# Patient Record
Sex: MALE | Race: WHITE | NOT HISPANIC OR LATINO | Employment: OTHER | URBAN - METROPOLITAN AREA
[De-identification: names, ages, dates, MRNs, and addresses within clinical notes are randomized per-mention and may not be internally consistent; named-entity substitution may affect disease eponyms.]

---

## 2018-02-12 RX ORDER — VITAMIN E 268 MG
400 CAPSULE ORAL DAILY
Status: ON HOLD | COMMUNITY
End: 2022-12-26

## 2018-02-12 RX ORDER — CLOPIDOGREL BISULFATE 75 MG/1
75 TABLET ORAL DAILY
COMMUNITY
End: 2022-12-26 | Stop reason: HOSPADM

## 2018-02-12 RX ORDER — TRAMADOL HYDROCHLORIDE 50 MG/1
50 TABLET ORAL EVERY 6 HOURS PRN
COMMUNITY

## 2018-02-12 RX ORDER — PROPRANOLOL HYDROCHLORIDE 80 MG/1
80 CAPSULE, EXTENDED RELEASE ORAL DAILY
COMMUNITY

## 2018-02-12 RX ORDER — MELATONIN
1000 DAILY
Status: ON HOLD | COMMUNITY
End: 2022-12-26

## 2018-02-12 RX ORDER — LANOLIN ALCOHOL/MO/W.PET/CERES
1000 CREAM (GRAM) TOPICAL DAILY
Status: ON HOLD | COMMUNITY
End: 2022-12-26

## 2018-02-12 RX ORDER — TAMSULOSIN HYDROCHLORIDE 0.4 MG/1
1 CAPSULE ORAL NIGHTLY
COMMUNITY

## 2018-02-12 RX ORDER — ASPIRIN 81 MG/1
81 TABLET ORAL DAILY
COMMUNITY
End: 2022-12-26 | Stop reason: HOSPADM

## 2018-02-12 RX ORDER — CHLORAL HYDRATE 500 MG
1000 CAPSULE ORAL
Status: ON HOLD | COMMUNITY
End: 2022-12-26

## 2018-02-12 NOTE — PERIOPERATIVE NURSING NOTE
CALLED AND SPOKE WITH PT CONFIRMING ARRIVAL TIME AND NEED TO BE NPO AFTER MN. MAY TAKE AM MEDS WITH A SIP OF WATER. FRIEND TO ACCOMPANY PT TO HOSPITAL IN THE AM

## 2018-02-13 ENCOUNTER — HOSPITAL ENCOUNTER (OUTPATIENT)
Facility: HOSPITAL | Age: 78
Setting detail: HOSPITAL OUTPATIENT SURGERY
Discharge: HOME | End: 2018-02-13
Attending: INTERNAL MEDICINE | Admitting: INTERNAL MEDICINE

## 2018-02-13 VITALS
WEIGHT: 148 LBS | SYSTOLIC BLOOD PRESSURE: 104 MMHG | OXYGEN SATURATION: 98 % | RESPIRATION RATE: 16 BRPM | BODY MASS INDEX: 21.92 KG/M2 | TEMPERATURE: 98.9 F | DIASTOLIC BLOOD PRESSURE: 57 MMHG | HEIGHT: 69 IN | HEART RATE: 58 BPM

## 2018-02-13 DIAGNOSIS — R07.9 CHEST PAIN, UNSPECIFIED TYPE: ICD-10-CM

## 2018-02-13 PROCEDURE — 99152 MOD SED SAME PHYS/QHP 5/>YRS: CPT | Performed by: INTERNAL MEDICINE

## 2018-02-13 PROCEDURE — 93458 L HRT ARTERY/VENTRICLE ANGIO: CPT | Performed by: INTERNAL MEDICINE

## 2018-02-13 PROCEDURE — 25010000002 FENTANYL CITRATE (PF) 100 MCG/2ML SOLUTION: Performed by: INTERNAL MEDICINE

## 2018-02-13 PROCEDURE — 0 IOPAMIDOL PER 1 ML: Performed by: INTERNAL MEDICINE

## 2018-02-13 PROCEDURE — 25010000002 MIDAZOLAM PER 1 MG: Performed by: INTERNAL MEDICINE

## 2018-02-13 PROCEDURE — 99153 MOD SED SAME PHYS/QHP EA: CPT | Performed by: INTERNAL MEDICINE

## 2018-02-13 PROCEDURE — C1894 INTRO/SHEATH, NON-LASER: HCPCS | Performed by: INTERNAL MEDICINE

## 2018-02-13 PROCEDURE — C1769 GUIDE WIRE: HCPCS | Performed by: INTERNAL MEDICINE

## 2018-02-13 PROCEDURE — 25010000002 HEPARIN (PORCINE) PER 1000 UNITS: Performed by: INTERNAL MEDICINE

## 2018-02-13 RX ORDER — SODIUM CHLORIDE 9 MG/ML
75 INJECTION, SOLUTION INTRAVENOUS CONTINUOUS
Status: DISCONTINUED | OUTPATIENT
Start: 2018-02-13 | End: 2018-02-13 | Stop reason: HOSPADM

## 2018-02-13 RX ORDER — SODIUM CHLORIDE 9 MG/ML
INJECTION, SOLUTION INTRAVENOUS CONTINUOUS PRN
Status: DISCONTINUED | OUTPATIENT
Start: 2018-02-13 | End: 2018-02-13 | Stop reason: HOSPADM

## 2018-02-13 RX ORDER — SODIUM CHLORIDE 0.9 % (FLUSH) 0.9 %
1-10 SYRINGE (ML) INJECTION AS NEEDED
Status: DISCONTINUED | OUTPATIENT
Start: 2018-02-13 | End: 2018-02-13 | Stop reason: HOSPADM

## 2018-02-13 RX ORDER — LIDOCAINE HYDROCHLORIDE 10 MG/ML
0.1 INJECTION, SOLUTION EPIDURAL; INFILTRATION; INTRACAUDAL; PERINEURAL ONCE AS NEEDED
Status: DISCONTINUED | OUTPATIENT
Start: 2018-02-13 | End: 2018-02-13 | Stop reason: HOSPADM

## 2018-02-13 RX ORDER — LIDOCAINE HYDROCHLORIDE 20 MG/ML
INJECTION, SOLUTION INFILTRATION; PERINEURAL AS NEEDED
Status: DISCONTINUED | OUTPATIENT
Start: 2018-02-13 | End: 2018-02-13 | Stop reason: HOSPADM

## 2018-02-13 RX ORDER — ATORVASTATIN CALCIUM 80 MG/1
80 TABLET, FILM COATED ORAL DAILY
COMMUNITY

## 2018-02-13 RX ORDER — SODIUM CHLORIDE 9 MG/ML
100 INJECTION, SOLUTION INTRAVENOUS CONTINUOUS
Status: CANCELLED | OUTPATIENT
Start: 2018-02-13

## 2018-02-13 RX ORDER — FENTANYL CITRATE 50 UG/ML
INJECTION, SOLUTION INTRAMUSCULAR; INTRAVENOUS AS NEEDED
Status: DISCONTINUED | OUTPATIENT
Start: 2018-02-13 | End: 2018-02-13 | Stop reason: HOSPADM

## 2018-02-13 RX ORDER — MIDAZOLAM HYDROCHLORIDE 1 MG/ML
INJECTION INTRAMUSCULAR; INTRAVENOUS AS NEEDED
Status: DISCONTINUED | OUTPATIENT
Start: 2018-02-13 | End: 2018-02-13 | Stop reason: HOSPADM

## 2018-02-13 RX ADMIN — SODIUM CHLORIDE 75 ML/HR: 9 INJECTION, SOLUTION INTRAVENOUS at 07:18

## 2018-02-13 RX ADMIN — SODIUM CHLORIDE 75 ML/HR: 9 INJECTION, SOLUTION INTRAVENOUS at 09:36

## 2018-02-13 NOTE — PLAN OF CARE
Problem: Cardiac Catheterization with/without PCI (Adult)  Goal: Signs and Symptoms of Listed Potential Problems Will be Absent or Manageable (Cardiac Catheterization with/without PCI)  Outcome: Outcome(s) achieved Date Met: 02/13/18 02/13/18 1118   Cardiac Catheterization with/without PCI   Problems Assessed (Cardiac Catheterization) all   Problems Present (Cardiac Catheterization) none

## 2018-02-13 NOTE — PLAN OF CARE
Problem: Patient Care Overview (Adult)  Goal: Plan of Care Review  Outcome: Outcome(s) achieved Date Met: 02/13/18 02/13/18 1119   Coping/Psychosocial Response Interventions   Plan Of Care Reviewed With patient;spouse   Patient Care Overview   Progress improving   Outcome Evaluation   Outcome Summary/Follow up Plan meets desires discharge

## 2018-02-13 NOTE — PLAN OF CARE
Problem: Patient Care Overview (Adult)  Goal: Adult Individualization and Mutuality  Outcome: Outcome(s) achieved Date Met: 02/13/18

## 2018-02-13 NOTE — PROCEDURES
:   Emelia Arellano MD    Procedures performed:  1.  Left heart catheterization.  2.  Left ventriculography    3.  Selective native coronary angiography  4.  Moderate sedation: 30 minutes    Indications: Abnormal stress test       Description of the procedure:  Patient was brought to the cardiac catheter was explained the risk and benefit and alternatives of the procedure. Patient signed informed consent, was prepped and draped in sterile fashion for right radial artery access.  Using micropuncture needle and modified Seldinger technique a 5 [place] sheath was advanced into the artery.  JL3.5 and JR4 catheter was used to engage the left and right coronary artery respectively.  A pigtail catheter was used to cross the aortic valve and perform a left heart catheterization and LV gram.    All exchanges were done over the wire.    Hemostasis was achieved by manual compression / radial band.    No complications noted.    Findings:  1.  Left heart catheterization: LVEDP 7 mmHg      2.  Left ventriculography:  LV function normal.  LVEF 65%.     3.  Selective native coronary angiography:  A. Left main bifurcates into LAD and left circumflex.  No angiographic disease noted.   B. LAD: Large vessel, wraps around the apex.  Gives rise to one prominent diagonal branch.  No angiographic disease noted in the LAD an its branches  C. Left circumflex: Gives rise to small caliber high OM1 followed by large caliber bifurcating OM 2.  No angiographic disease noted in circumflex and its branches  D. Right coronary artery: Dominant vessel.  No angiographic disease noted.      Conclusion:  Normal epicardial coronary blood vessels.  Normal LVEDP.  Preserved LV function.  False-positive stress test      Recommendations:  Aggressive risk factor modification.  Medical management.    Emelia Arellano M.D   CC: Dr Elmo Piña

## 2018-02-13 NOTE — PLAN OF CARE
Problem: Patient Care Overview (Adult)  Goal: Discharge Needs Assessment  Outcome: Outcome(s) achieved Date Met: 02/13/18 02/13/18 1118   Discharge Needs Assessment   Concerns To Be Addressed no discharge needs identified

## 2018-02-13 NOTE — DISCHARGE INSTRUCTIONS
[facility]  [address] [place] [address]    Coronary Angiogram (Radial/Ulnar Approach) After Care    Refer to this sheet in the next few weeks. These instructions provide you with information on caring for yourself after your procedure. Your caregiver may also give you more specific instructions. Your treatment has been planned according to current medical practices, but problems sometimes occur. Call your caregiver if you have any problems or questions after your procedure.    Home Care Instructions:  · You may shower the day after the procedure. Remove the bandage (dressing) and gently wash the site with plain soap and water. Gently pat the site dry. You may apply a band aid daily for 2 days if desired.    · Do not apply powder or lotion to the site.  · Do not submerge the affected site in water for 3 to 5 days or until the site is completely healed.   · Do not lift, push or pull anything over 10 pounds for 2 days after your procedure.  · Inspect the site at least twice daily. You may notice some bruising at the site and it may be tender for 1 to 2 weeks.     · Increase your fluid intake for the next 2 days.    · Keep arm elevated for 24 hours. For the remainder of the day, keep your arm in “Pledge of Allegiance” position when up and about.     · You may drive 24 hours after the procedure unless otherwise instructed by your caregiver.  · Do not operate machinery or power tools for 24 hours.  · A responsible adult should be with you for the first 24 hours after you arrive home. Do not make any important legal decisions or sign legal papers for 24 hours.      Call Your Doctor if:   · You have unusual pain at the radial/ulnar (wrist) site.  · You have redness, warmth, swelling, or pain at the radial/ulnar (wrist) site.  · You have drainage (other than a small amount of blood on the dressing).  · You have chills or a fever > 101.  · Your arm becomes pale or dark, cool, tingly, or numb.  · You  have heavy bleeding from the site, hold pressure on the site for 20 minutes.  If the bleeding stops, apply a fresh bandage and call your cardiologist.  However, if you continue to have bleeding, call 911.

## 2018-02-13 NOTE — H&P
Brief history and physical:    Chief complaint: dyspnea    History of presenting illness: 77-year-old  male who was recently evaluated for dyspnea on exertion by Dr. Piña in outpatient clinic with a stress test.  Patient stresses shows moderate amount of ischemia.  Patient is been brought to cardiac catheter lab as an outpatient to undergo coronary angiography to look for epicardial culprits.  Currently it today he denies any chest pain, and denies dyspnea at rest, orthopnea, PND, pedal edema, palpitations and syncope.  His telemetry monitoring reveals sinus rhythm with PACs/atrial bigeminy.     Past medical history: abnromal stress test    Past surgical history: No cardiac surgeries    Outpatient medications patient on aspirin, Plavix and statin.    Review of systems:  General: No fever no weight loss  Endocrine: No fever, night sweats  Eyes: No blurry vision no diplopia  Head: No headache  Chest: As above  Heart: As above  Abdomen: No abdominal pain, nausea, vomiting, diarrhea  Psych: No hallucinations or delusions  Neurological no motor or sensory deficit  Musculoskeletal: No limb deformities  HEME: No swelling no weight loss  Skin: No ulcers, no rash    On examination: 96/64, 68, 16, afebrile   General: No acute distress  Neck: No thyromegaly  HEENT:PERRLA  Abdomen: Soft, nontender, bowel sounds present  Chest: Normal vesicular breath sounds heard bilaterally  Heart: S1-S2 heard.  Regular, no murmur  Extremity: No edema, distal pulses present.  Musculoskeletal: No obvious scoliosis  Psych: Mood and affect normal  Neuro: Alert and oriented ×3.  Speech and gait normal    Labs: Reviewed    Assessment and plan:  1.  Abnormal stress test: Moderate amount of ischemia in stress test.  Proceed with coronary angiography/ left heart catheterization.  Further plan after heart catheterization done

## 2019-10-02 NOTE — DISCHARGE SUMMARY
Brief Discharge Note:    77 yr old who follows with Dr Humphrey as outpatient comes as outpatient for abnormal stress test.   He is experiencing dyspnea on exertion. Denies titus chest pain, PND, orthopnea, pedal edema, palpitations and syncope.   He under went cardiac cath revealing normal epicardial coronary vessels and preserved LVEF.     Discharge medications:  As in medication reconciliation.    Diet: Cardiac    Allergies: No known drug allergies    Procedures performed: Patient underwent left heart catheterization revealing normal epicardial coronary blood vessels.  Preserved LV function.  Normal LVEDP.    Condition: Stable.    Discharge: Home.  Today after radial care according to postprocedure done.     Outpatient follow-up:  Dr. Piña in 3 weeks    
General

## 2022-12-25 ENCOUNTER — APPOINTMENT (OUTPATIENT)
Dept: CT IMAGING | Facility: HOSPITAL | Age: 82
End: 2022-12-25
Payer: MEDICARE

## 2022-12-25 ENCOUNTER — APPOINTMENT (OUTPATIENT)
Dept: GENERAL RADIOLOGY | Facility: HOSPITAL | Age: 82
End: 2022-12-25
Payer: MEDICARE

## 2022-12-25 ENCOUNTER — HOSPITAL ENCOUNTER (OUTPATIENT)
Facility: HOSPITAL | Age: 82
LOS: 1 days | Discharge: HOME | End: 2022-12-26
Attending: EMERGENCY MEDICINE | Admitting: INTERNAL MEDICINE
Payer: MEDICARE

## 2022-12-25 DIAGNOSIS — R07.81 RIB PAIN ON RIGHT SIDE: ICD-10-CM

## 2022-12-25 DIAGNOSIS — S06.6X0A SUBARACHNOID HEMORRHAGE FOLLOWING INJURY, NO LOSS OF CONSCIOUSNESS, INITIAL ENCOUNTER: Primary | ICD-10-CM

## 2022-12-25 LAB
ALBUMIN SERPL-MCNC: 4.4 G/DL (ref 3.5–5.2)
ALBUMIN/GLOB SERPL: 2.4 G/DL
ALP SERPL-CCNC: 69 U/L (ref 39–117)
ALT SERPL W P-5'-P-CCNC: 38 U/L (ref 1–41)
ANION GAP SERPL CALCULATED.3IONS-SCNC: 8 MMOL/L (ref 5–15)
AST SERPL-CCNC: 34 U/L (ref 1–40)
BASOPHILS # BLD AUTO: 0.06 10*3/MM3 (ref 0–0.2)
BASOPHILS NFR BLD AUTO: 0.6 % (ref 0–1.5)
BILIRUB SERPL-MCNC: 1.5 MG/DL (ref 0–1.2)
BUN SERPL-MCNC: 19 MG/DL (ref 8–23)
BUN/CREAT SERPL: 19.4 (ref 7–25)
CALCIUM SPEC-SCNC: 9.5 MG/DL (ref 8.6–10.5)
CHLORIDE SERPL-SCNC: 108 MMOL/L (ref 98–107)
CO2 SERPL-SCNC: 29 MMOL/L (ref 22–29)
CREAT SERPL-MCNC: 0.98 MG/DL (ref 0.76–1.27)
DEPRECATED RDW RBC AUTO: 49 FL (ref 37–54)
EGFRCR SERPLBLD CKD-EPI 2021: 78 ML/MIN/1.73
EOSINOPHIL # BLD AUTO: 0.1 10*3/MM3 (ref 0–0.4)
EOSINOPHIL NFR BLD AUTO: 1 % (ref 0.3–6.2)
ERYTHROCYTE [DISTWIDTH] IN BLOOD BY AUTOMATED COUNT: 13.2 % (ref 12.3–15.4)
GLOBULIN UR ELPH-MCNC: 1.8 GM/DL
GLUCOSE BLDC GLUCOMTR-MCNC: 70 MG/DL (ref 70–130)
GLUCOSE BLDC GLUCOMTR-MCNC: 85 MG/DL (ref 70–130)
GLUCOSE SERPL-MCNC: 88 MG/DL (ref 65–99)
HCT VFR BLD AUTO: 45.4 % (ref 37.5–51)
HGB BLD-MCNC: 14.8 G/DL (ref 13–17.7)
IMM GRANULOCYTES # BLD AUTO: 0.03 10*3/MM3 (ref 0–0.05)
IMM GRANULOCYTES NFR BLD AUTO: 0.3 % (ref 0–0.5)
INR PPP: 1 (ref 0.9–1.1)
LYMPHOCYTES # BLD AUTO: 1.93 10*3/MM3 (ref 0.7–3.1)
LYMPHOCYTES NFR BLD AUTO: 19.3 % (ref 19.6–45.3)
MAGNESIUM SERPL-MCNC: 1.9 MG/DL (ref 1.6–2.4)
MCH RBC QN AUTO: 32.5 PG (ref 26.6–33)
MCHC RBC AUTO-ENTMCNC: 32.6 G/DL (ref 31.5–35.7)
MCV RBC AUTO: 99.8 FL (ref 79–97)
MONOCYTES # BLD AUTO: 0.83 10*3/MM3 (ref 0.1–0.9)
MONOCYTES NFR BLD AUTO: 8.3 % (ref 5–12)
NEUTROPHILS NFR BLD AUTO: 7.07 10*3/MM3 (ref 1.7–7)
NEUTROPHILS NFR BLD AUTO: 70.5 % (ref 42.7–76)
NRBC BLD AUTO-RTO: 0 /100 WBC (ref 0–0.2)
PHOSPHATE SERPL-MCNC: 3.3 MG/DL (ref 2.5–4.5)
PLATELET # BLD AUTO: 144 10*3/MM3 (ref 140–450)
PMV BLD AUTO: 10.7 FL (ref 6–12)
POTASSIUM SERPL-SCNC: 5.2 MMOL/L (ref 3.5–5.2)
PROT SERPL-MCNC: 6.2 G/DL (ref 6–8.5)
PROTHROMBIN TIME: 13.3 SECONDS (ref 11.7–14.2)
RBC # BLD AUTO: 4.55 10*6/MM3 (ref 4.14–5.8)
SODIUM SERPL-SCNC: 145 MMOL/L (ref 136–145)
WBC NRBC COR # BLD: 10.02 10*3/MM3 (ref 3.4–10.8)

## 2022-12-25 PROCEDURE — 82962 GLUCOSE BLOOD TEST: CPT

## 2022-12-25 PROCEDURE — 36415 COLL VENOUS BLD VENIPUNCTURE: CPT

## 2022-12-25 PROCEDURE — 85025 COMPLETE CBC W/AUTO DIFF WBC: CPT | Performed by: EMERGENCY MEDICINE

## 2022-12-25 PROCEDURE — 84100 ASSAY OF PHOSPHORUS: CPT | Performed by: INTERNAL MEDICINE

## 2022-12-25 PROCEDURE — 71101 X-RAY EXAM UNILAT RIBS/CHEST: CPT

## 2022-12-25 PROCEDURE — 25010000002 HYDRALAZINE PER 20 MG: Performed by: INTERNAL MEDICINE

## 2022-12-25 PROCEDURE — 80053 COMPREHEN METABOLIC PANEL: CPT | Performed by: EMERGENCY MEDICINE

## 2022-12-25 PROCEDURE — 99284 EMERGENCY DEPT VISIT MOD MDM: CPT

## 2022-12-25 PROCEDURE — 99203 OFFICE O/P NEW LOW 30 MIN: CPT | Performed by: NEUROLOGICAL SURGERY

## 2022-12-25 PROCEDURE — 83735 ASSAY OF MAGNESIUM: CPT | Performed by: INTERNAL MEDICINE

## 2022-12-25 PROCEDURE — 70450 CT HEAD/BRAIN W/O DYE: CPT

## 2022-12-25 PROCEDURE — 85610 PROTHROMBIN TIME: CPT | Performed by: EMERGENCY MEDICINE

## 2022-12-25 PROCEDURE — 96374 THER/PROPH/DIAG INJ IV PUSH: CPT

## 2022-12-25 RX ORDER — TAMSULOSIN HYDROCHLORIDE 0.4 MG/1
0.4 CAPSULE ORAL NIGHTLY
Status: DISCONTINUED | OUTPATIENT
Start: 2022-12-25 | End: 2022-12-26 | Stop reason: HOSPADM

## 2022-12-25 RX ORDER — PROPRANOLOL HYDROCHLORIDE 80 MG/1
80 CAPSULE, EXTENDED RELEASE ORAL DAILY
Status: DISCONTINUED | OUTPATIENT
Start: 2022-12-25 | End: 2022-12-26 | Stop reason: HOSPADM

## 2022-12-25 RX ORDER — SODIUM CHLORIDE 0.9 % (FLUSH) 0.9 %
10 SYRINGE (ML) INJECTION AS NEEDED
Status: DISCONTINUED | OUTPATIENT
Start: 2022-12-25 | End: 2022-12-26 | Stop reason: HOSPADM

## 2022-12-25 RX ORDER — LEVETIRACETAM 500 MG/1
500 TABLET ORAL 2 TIMES DAILY
Status: DISCONTINUED | OUTPATIENT
Start: 2022-12-25 | End: 2022-12-26 | Stop reason: HOSPADM

## 2022-12-25 RX ORDER — HYDRALAZINE HYDROCHLORIDE 20 MG/ML
10 INJECTION INTRAMUSCULAR; INTRAVENOUS EVERY 4 HOURS PRN
Status: DISCONTINUED | OUTPATIENT
Start: 2022-12-25 | End: 2022-12-26 | Stop reason: HOSPADM

## 2022-12-25 RX ORDER — HYDROCODONE BITARTRATE AND ACETAMINOPHEN 10; 325 MG/1; MG/1
1 TABLET ORAL EVERY 6 HOURS PRN
Status: DISCONTINUED | OUTPATIENT
Start: 2022-12-25 | End: 2022-12-26 | Stop reason: HOSPADM

## 2022-12-25 RX ORDER — ATORVASTATIN CALCIUM 80 MG/1
80 TABLET, FILM COATED ORAL DAILY
Status: DISCONTINUED | OUTPATIENT
Start: 2022-12-25 | End: 2022-12-26 | Stop reason: HOSPADM

## 2022-12-25 RX ORDER — TRAMADOL HYDROCHLORIDE 50 MG/1
50 TABLET ORAL EVERY 6 HOURS PRN
Status: DISCONTINUED | OUTPATIENT
Start: 2022-12-25 | End: 2022-12-25

## 2022-12-25 RX ADMIN — TRAMADOL HYDROCHLORIDE 50 MG: 50 TABLET, COATED ORAL at 17:08

## 2022-12-25 RX ADMIN — PROPRANOLOL HYDROCHLORIDE 80 MG: 80 CAPSULE, EXTENDED RELEASE ORAL at 18:00

## 2022-12-25 RX ADMIN — HYDRALAZINE HYDROCHLORIDE 10 MG: 20 INJECTION INTRAMUSCULAR; INTRAVENOUS at 16:38

## 2022-12-25 RX ADMIN — HYDROCODONE BITARTRATE AND ACETAMINOPHEN 1 TABLET: 10; 325 TABLET ORAL at 18:17

## 2022-12-25 RX ADMIN — LEVETIRACETAM 500 MG: 500 TABLET, FILM COATED ORAL at 20:24

## 2022-12-25 RX ADMIN — TAMSULOSIN HYDROCHLORIDE 0.4 MG: 0.4 CAPSULE ORAL at 20:24

## 2022-12-25 RX ADMIN — ATORVASTATIN CALCIUM 80 MG: 80 TABLET, FILM COATED ORAL at 16:38

## 2022-12-25 NOTE — H&P
[facility]  [phone]  Dr. Eze Cortes      Subjective   LOS: 0 days     82-year-old male who slipped on the ice yesterday and hit his head on the ground.  This morning he noticed some blood on his head and came into the ED.  CT head shows small traumatic subarachnoid hemorrhage.  Admitted to ICU for monitoring.  His med list shows that patient is on Plavix.  However patient strenuously denies being on this medication.  I do see it listed on multiple prior office notes from outside.  He follows with vascular surgery for peripheral vascular disease.  Vascular notes mention aspirin but his PCP office note does not include a list of his current medications.  We will need to clarify.  In any case he denies coronary artery disease and no prior stents to heart or 2 legs.  He does have peripheral vascular disease.  He is a current cigarette smoker.  He has COPD and uses inhalers at home but not oxygen.  He denies any history of cancer.  He drinks variably 3-5 drinks a day and had 4-5 alcoholic beverages last night.  He does not report any history of alcohol withdrawal.  No prior history of stroke    Devin Scales  reports current alcohol use.,  reports that he has been smoking. He has never used smokeless tobacco.     Past Hx:  has a past medical history of Benign essential tremor, BPH (benign prostatic hyperplasia), Chest pain, Dyspnea, Fatigue, and Hyperlipidemia.  Surg Hx:  has a past surgical history that includes Cardiac catheterization (N/A, 2/13/2018); Cardiac catheterization (N/A, 2/13/2018); and Cardiac catheterization (N/A, 2/13/2018).  FH: family history is not on file.  SH:  reports that he has been smoking. He has never used smokeless tobacco. He reports current alcohol use. He reports that he does not use drugs.    Medications Prior to Admission   Medication Sig Dispense Refill Last Dose   • aspirin 81 MG EC tablet Take 81 mg by mouth Daily.      • atorvastatin (LIPITOR) 80 MG tablet  Take 80 mg by mouth Daily.      • cholecalciferol (VITAMIN D3) 1000 units tablet Take 1,000 Units by mouth Daily.      • clopidogrel (PLAVIX) 75 MG tablet Take 75 mg by mouth Daily.      • Omega-3 Fatty Acids (FISH OIL) 1000 MG capsule capsule Take 1,000 mg by mouth Daily With Breakfast.      • propranolol LA (INDERAL LA) 80 MG 24 hr capsule Take 80 mg by mouth Daily.      • tamsulosin (FLOMAX) 0.4 MG capsule 24 hr capsule Take 1 capsule by mouth Every Night.      • traMADol (ULTRAM) 50 MG tablet Take 50 mg by mouth Every 6 (Six) Hours As Needed for Moderate Pain .      • vitamin B-12 (CYANOCOBALAMIN) 1000 MCG tablet Take 1,000 mcg by mouth Daily.      • vitamin E 400 UNIT capsule Take 400 Units by mouth Daily.        No Known Allergies    Review of Systems   Constitutional: Negative for chills and fever.   HENT: Negative for congestion and sore throat.    Respiratory: Negative for cough and shortness of breath.    Cardiovascular: Negative for chest pain and leg swelling.   Gastrointestinal: Negative for abdominal pain, nausea and vomiting.   Genitourinary: Negative for dysuria and hematuria.   Musculoskeletal: Negative for arthralgias and back pain.   Skin: Negative for pallor and rash.   Neurological: Positive for headaches. Negative for seizures.   Psychiatric/Behavioral: Negative for agitation and confusion.     Vital Signs past 24hrs  BP range: BP: (153-177)/(76-96) 164/76  Pulse range: Heart Rate:  [71-88] 84  Resp rate range: Resp:  [16] 16  Temp range: Temp (24hrs), Av.6 °F (35.9 °C), Min:96.6 °F (35.9 °C), Max:96.6 °F (35.9 °C)    Oxygen range: SpO2:  [94 %-97 %] 96 %;  ;   Device (Oxygen Therapy): room air   ; There is no height or weight on file to calculate BMI.  No intake/output data recorded.    Adult male who looks older than stated age.  Pupils equal react light.  Oropharynx somewhat dry.  Class II Mallampati airway large tongue.  JVP not elevated trachea midline thyroid not enlarged.   Nasopharynx without discharge.  Lungs reveal bilateral air entry.  Diminished breath sounds but equal with no wheezing or rales or rhonchi.  Percussion note resonant chest expansion equal no chest wall deformity or tenderness.  Heart examination S1-S2 present with regular rhythm no murmurs.  No edema lower extremities.  Abdomen is soft nontender bowel sounds present no liver spleen enlargement.  No peripheral cyanosis clubbing.  Moves all 4 extremities sensorimotor intact.  No cervical, axillary, inguinal adenopathy.    Results Review:    I have reviewed the laboratory and imaging data from current admission. My annotations are as noted in assessment and plan.    Medication Review:  I have reviewed the current MAR. My annotations are as noted in assessment and plan.    Plan   PCCM Problems  Traumatic subarachnoid hemorrhage  COPD without exacerbation  Current cigarette smoker  Current regular use of alcohol  Peripheral vascular disease  Unclear if patient currently on clopidogrel at home      Plan of Treatment    Appreciate neurosurgery input.  Monitor for now.  Keppra for 5 days as per outlined by neurosurgery.  Head CT tomorrow and if stable possibly discharge home.    COPD without exacerbation.  We will give Brovana twice daily.    Patient counseled to quit smoking.    Patient counseled to limit or completely abstain from alcohol.    Peripheral vascular disease on aspirin but will hold medication.  Unclear if patient is on clopidogrel and this will need to be clarified tomorrow morning.  He should also probably continue to hold his vitamin D.    Currently hypertension.  Is on propranolol at home which I will continue.  Will give as needed hydralazine to keep systolic less than 150.    Electronically signed by Eze Cortes MD, 12/25/22, 4:06 PM EST.      Part of this note may be an electronic transcription/translation of spoken language to printed text using the Dragon Dictation System.

## 2022-12-25 NOTE — CONSULTS
NEUROSURGERY CONSULT      Devin Scales  1940  [id number]    Referring Provider: No referring provider defined for this encounter.  Reason for Consultation/Chief Complaint: Head trauma, GLF    Patient Care Team:  Baldev Kothari MD as PCP - General (Family Medicine)  Herminio Humphrey MD as Consulting Physician (Cardiology)    Subjective .     History of Present Illness:    Duration: 1 day ago  Severity: Severe  Timing: Acute  Associated Symptoms: Headache, no other focal neurological deficits. Some anterograde memory loss.  Narrative: Fell after slipping on ice last night. Does not remember hitting his head. Complains of current mild headache. Takes Plavix.    While in the room and during my examination of the patient I wore a mask and eye protection.  I washed my hands before and after this patient encounter.  The patient was also wearing a mask.    Review of Systems: All 14 systems are reviewed and are negative except for what is documented above in the HPI  Review of Systems    History: I have reviewed and agree with the following documented PMH, PSH, FH and there no additions or changes.  Past Medical History:   Diagnosis Date   • Benign essential tremor    • BPH (benign prostatic hyperplasia)    • Chest pain    • Dyspnea    • Fatigue    • Hyperlipidemia     and   Past Surgical History:   Procedure Laterality Date   • CARDIAC CATHETERIZATION N/A 2/13/2018    Procedure: Left Heart Cath;  Surgeon: Emelia Arellano MD;  Location: [facility] INVASIVE LOCATION;  Service:    • CARDIAC CATHETERIZATION N/A 2/13/2018    Procedure: Coronary angiography;  Surgeon: Emelia Arellano MD;  Location: [facility] INVASIVE LOCATION;  Service:    • CARDIAC CATHETERIZATION N/A 2/13/2018    Procedure: Left ventriculography;  Surgeon: Emelia Arellano MD;  Location: [facility] INVASIVE LOCATION;  Service:     and The patient has a family history of    Objective     Physical Exam:  CON:  Appears stated age. No acute  distress.  HEENT:  Atraumatic and normocephalic.  PULM:  Breathing nonlabored on room air  CARDIO:  RRR, Pulses 2+  MSK:  Limbs intact. Musculoskeletal pain is absent  PSYCH:  Appears within normal limits.  SKIN:  No noticeable skin lesions or abrasions  VITALS:  Temp:  [96.6 °F (35.9 °C)] 96.6 °F (35.9 °C)  Heart Rate:  [71-75] 74  Resp:  [16] 16  BP: (153-177)/(83-96) 153/83, There is no height or weight on file to calculate BMI.  NEURO: Neurologic Exam     Mental Status   Oriented to person, place, and time.   Attention: normal. Concentration: normal.   Speech: speech is normal   Level of consciousness: alert    Cranial Nerves   Cranial nerves II through XII intact.     Motor Exam   Muscle bulk: normal  Overall muscle tone: normal    Strength   Strength 5/5 except as noted.     Sensory Exam   Light touch normal.     Gait, Coordination, and Reflexes     Coordination   Romberg: negative    Reflexes   Reflexes 2+ except as noted.         Results Review: I reviewed the patient's new clinical results.    CT: CT of the head was reviewed and shows R frontal tSAH without any mass effect or shift    I personally reviewed the images from the following radiographic studies.    Lab Results (last 24 hours)     Procedure Component Value Units Date/Time    CBC & Differential [[id number]]  (Abnormal) Collected: 12/25/22 1433    Specimen: Blood Updated: 12/25/22 1442    Narrative:      The following orders were created for panel order CBC & Differential.  Procedure                               Abnormality         Status                     ---------                               -----------         ------                     CBC Auto Differential[[id number]]        Abnormal            Final result                 Please view results for these tests on the individual orders.    Comprehensive Metabolic Panel [[id number]] Collected: 12/25/22 1433    Specimen: Blood Updated: 12/25/22 1435    Protime-INR [[id number]] Collected: 12/25/22  1433    Specimen: Blood Updated: 12/25/22 1435    CBC Auto Differential [[id number]]  (Abnormal) Collected: 12/25/22 1433    Specimen: Blood Updated: 12/25/22 1442     WBC 10.02 10*3/mm3      RBC 4.55 10*6/mm3      Hemoglobin 14.8 g/dL      Hematocrit 45.4 %      MCV 99.8 fL      MCH 32.5 pg      MCHC 32.6 g/dL      RDW 13.2 %      RDW-SD 49.0 fl      MPV 10.7 fL      Platelets 144 10*3/mm3      Neutrophil % 70.5 %      Lymphocyte % 19.3 %      Monocyte % 8.3 %      Eosinophil % 1.0 %      Basophil % 0.6 %      Immature Grans % 0.3 %      Neutrophils, Absolute 7.07 10*3/mm3      Lymphocytes, Absolute 1.93 10*3/mm3      Monocytes, Absolute 0.83 10*3/mm3      Eosinophils, Absolute 0.10 10*3/mm3      Basophils, Absolute 0.06 10*3/mm3      Immature Grans, Absolute 0.03 10*3/mm3      nRBC 0.0 /100 WBC           Assessment & Plan :     Assessment: Devin Scales is a 82 y.o. male who presents with R frontal tSAH  Additional workup: Repeat HCT tomorrow AM, Keppra 500 BID x5d total  Surgery planned: None  Additional plan: Devin is doing well. I counseled him about a concussion and his head bleed. He is on Plavix and I think we should hold that while he is inpatient and he can restart it a week after he leaves the hospital if his HCT is stable tomorrow. Keppra x5d. Unlikely to be symptomatic.      * No active hospital problems. *      I discussed the patient's findings and my recommendations with patient    Brian Joiner MD  12/25/22  14:50 EST

## 2022-12-25 NOTE — ED TRIAGE NOTES
Slipped on ice last night.  He c/o right side pain.  Denies hitting head.  No blood thinners    Patient was placed in face mask during first look triage.  Patient was wearing a face mask throughout encounter.  I wore personal protective equipment throughout the encounter.  Hand hygiene was performed before and after patient encounter.

## 2022-12-25 NOTE — ED PROVIDER NOTES
EMERGENCY DEPARTMENT ENCOUNTER    Room Number:  35/35  Date of encounter:  12/25/2022  PCP: Baldev Kothari MD  Historian: Patient, friend at bedside    I used full protective equipment while examining this patient.  This includes face mask, gloves and protective eyewear.  I washed my hands before entering the room and immediately upon leaving the room      HPI:  Chief Complaint: Fall  A complete HPI/ROS/PMH/PSH/SH/FH are unobtainable due to: None    Context: Devin Scales is a 82 y.o. male who presents to the ED c/o fall.  Patient tripped and fell about 8:00 last night as he slipped on the ice.  He landed on his right side and complains of pain about the right lateral ribs.  Also hit his head and sustained abrasion to the back of his head.  He did not lose consciousness.  Complains of mild headache.  Most of the pain is at the right lateral ribs and is worsened with movement, deep breath or breathing.  Denies abdominal pain.  Denies injury to the arms or legs.      MEDICAL RECORD REVIEW  I reviewed prior medical records including recent note with vascular surgery.  Patient has a history of hyperlipidemia, BPH and essential tremor.  Patient is anticoagulated on Plavix    PAST MEDICAL HISTORY  Active Ambulatory Problems     Diagnosis Date Noted   • No Active Ambulatory Problems     Resolved Ambulatory Problems     Diagnosis Date Noted   • No Resolved Ambulatory Problems     Past Medical History:   Diagnosis Date   • Benign essential tremor    • BPH (benign prostatic hyperplasia)    • Chest pain    • Dyspnea    • Fatigue    • Hyperlipidemia          PAST SURGICAL HISTORY  Past Surgical History:   Procedure Laterality Date   • CARDIAC CATHETERIZATION N/A 2/13/2018    Procedure: Left Heart Cath;  Surgeon: Emelia Arellano MD;  Location: [facility] INVASIVE LOCATION;  Service:    • CARDIAC CATHETERIZATION N/A 2/13/2018    Procedure: Coronary angiography;  Surgeon: Emelia Arellano MD;  Location: [facility] INVASIVE  LOCATION;  Service:    • CARDIAC CATHETERIZATION N/A 2/13/2018    Procedure: Left ventriculography;  Surgeon: Emelia Arellano MD;  Location: [facility] INVASIVE LOCATION;  Service:          FAMILY HISTORY  History reviewed. No pertinent family history.      SOCIAL HISTORY  Social History     Socioeconomic History   • Marital status: Single   Tobacco Use   • Smoking status: Every Day   • Smokeless tobacco: Never   Substance and Sexual Activity   • Alcohol use: Yes     Comment: SOCIALLY   • Drug use: No   • Sexual activity: Defer         ALLERGIES  Patient has no known allergies.       REVIEW OF SYSTEMS  Review of Systems   Constitutional: Negative for fever.   Respiratory: Negative for shortness of breath.    Cardiovascular: Negative for chest pain.   Musculoskeletal:        Right lateral rib pain   Neurological: Positive for headaches.   All other systems reviewed and are negative.          PHYSICAL EXAM    I have reviewed the triage vital signs and nursing notes.    ED Triage Vitals   Temp Pulse Resp BP SpO2   12/25/22 0951 -- 12/25/22 0951 12/25/22 1003 --   96.6 °F (35.9 °C)  16 177/86       Temp src Heart Rate Source Patient Position BP Location FiO2 (%)   12/25/22 0951 -- -- -- --   Tympanic           Physical Exam  GENERAL: Alert and pleasant male in no obvious distress.  Triage vitals reviewed and are benign  HENT: nares patent, roughly 3 x 3 cm abrasion to the occiput without active bleeding  EYES: no scleral icterus  CV: regular rhythm, regular rate-no murmur  RESPIRATORY: normal effort, clear to auscultation bilaterally-O2 sats upper 90s on room air  ABDOMEN: soft, nontender to palp  MUSCULOSKELETAL: Spine-no segment tenderness to palpation about the cervical, thoracic or lumbar spine  Upper extremities-atraumatic  Lower extremities-atraumatic  Chest- moderate tenderness to palpation at the right lower lateral ribs.  No obvious bony deformity.  NEURO: Strength sensation and coordination are grossly  intact.  Speech and mentation are unremarkable  SKIN: warm, dry      LAB RESULTS  Recent Results (from the past 24 hour(s))   Comprehensive Metabolic Panel    Collection Time: 12/25/22  2:33 PM    Specimen: Blood   Result Value Ref Range    Glucose 88 65 - 99 mg/dL    BUN 19 8 - 23 mg/dL    Creatinine 0.98 0.76 - 1.27 mg/dL    Sodium 145 136 - 145 mmol/L    Potassium 5.2 3.5 - 5.2 mmol/L    Chloride 108 (H) 98 - 107 mmol/L    CO2 29.0 22.0 - 29.0 mmol/L    Calcium 9.5 8.6 - 10.5 mg/dL    Total Protein 6.2 6.0 - 8.5 g/dL    Albumin 4.40 3.50 - 5.20 g/dL    ALT (SGPT) 38 1 - 41 U/L    AST (SGOT) 34 1 - 40 U/L    Alkaline Phosphatase 69 39 - 117 U/L    Total Bilirubin 1.5 (H) 0.0 - 1.2 mg/dL    Globulin 1.8 gm/dL    A/G Ratio 2.4 g/dL    BUN/Creatinine Ratio 19.4 7.0 - 25.0    Anion Gap 8.0 5.0 - 15.0 mmol/L    eGFR 78.0 >60.0 mL/min/1.73   Protime-INR    Collection Time: 12/25/22  2:33 PM    Specimen: Blood   Result Value Ref Range    Protime 13.3 11.7 - 14.2 Seconds    INR 1.00 0.90 - 1.10   CBC Auto Differential    Collection Time: 12/25/22  2:33 PM    Specimen: Blood   Result Value Ref Range    WBC 10.02 3.40 - 10.80 10*3/mm3    RBC 4.55 4.14 - 5.80 10*6/mm3    Hemoglobin 14.8 13.0 - 17.7 g/dL    Hematocrit 45.4 37.5 - 51.0 %    MCV 99.8 (H) 79.0 - 97.0 fL    MCH 32.5 26.6 - 33.0 pg    MCHC 32.6 31.5 - 35.7 g/dL    RDW 13.2 12.3 - 15.4 %    RDW-SD 49.0 37.0 - 54.0 fl    MPV 10.7 6.0 - 12.0 fL    Platelets 144 140 - 450 10*3/mm3    Neutrophil % 70.5 42.7 - 76.0 %    Lymphocyte % 19.3 (L) 19.6 - 45.3 %    Monocyte % 8.3 5.0 - 12.0 %    Eosinophil % 1.0 0.3 - 6.2 %    Basophil % 0.6 0.0 - 1.5 %    Immature Grans % 0.3 0.0 - 0.5 %    Neutrophils, Absolute 7.07 (H) 1.70 - 7.00 10*3/mm3    Lymphocytes, Absolute 1.93 0.70 - 3.10 10*3/mm3    Monocytes, Absolute 0.83 0.10 - 0.90 10*3/mm3    Eosinophils, Absolute 0.10 0.00 - 0.40 10*3/mm3    Basophils, Absolute 0.06 0.00 - 0.20 10*3/mm3    Immature Grans, Absolute 0.03 0.00  - 0.05 10*3/mm3    nRBC 0.0 0.0 - 0.2 /100 WBC       Ordered the above labs and independently reviewed the results.      RADIOLOGY  XR Ribs Right With PA Chest    Result Date: 12/25/2022  XR RIBS RIGHT W PA CHEST-  Clinical: Right rib pain, fell  FINDINGS: Cardiac size within normal limits. No mediastinal or hilar abnormality. There is atherosclerotic calcification of the aorta. No pleural effusion, active airspace disease nor pneumothorax.  Satisfactory right rib detail, no fracture or bone lesion seen.  CONCLUSION: No active disease of the chest, satisfactory right rib detail.  This report was finalized on 12/25/2022 12:36 PM by Dr. Pete Georges M.D.        I ordered the above noted radiological studies. Reviewed by me and discussed with radiologist.  See dictation for official radiology interpretation.      PROCEDURES  Critical Care  Performed by: Hernando Amaro MD  Authorized by: Hernando Amaro MD     Critical care provider statement:     Critical care time (minutes):  35    Critical care was necessary to treat or prevent imminent or life-threatening deterioration of the following conditions:  CNS failure or compromise    Critical care was time spent personally by me on the following activities:  Development of treatment plan with patient or surrogate, discussions with consultants, pulse oximetry, ordering and review of radiographic studies, ordering and review of laboratory studies, ordering and performing treatments and interventions, evaluation of patient's response to treatment, examination of patient and obtaining history from patient or surrogate          MEDICATIONS GIVEN IN ER    Medications   sodium chloride 0.9 % flush 10 mL (has no administration in time range)   levETIRAcetam (KEPPRA) tablet 500 mg (has no administration in time range)         PROGRESS, DATA ANALYSIS, CONSULTS, AND MEDICAL DECISION MAKING    All labs have been independently reviewed by me.  All radiology studies have been  reviewed by me and discussed with radiologist dictating the report.   EKG's independently viewed and interpreted by me.  Discussion below represents my analysis of pertinent findings related to patient's condition, differential diagnosis, treatment plan and final disposition.      ED Course as of 12/25/22 1514   Sun Dec 25, 2022   [age]old male presents after slip and fall last night on the ice.  He landed on his right side and has pain about the right lateral ribs.  Also did hit the back of his head without loss of consciousness.  He is anticoagulated on Plavix.    On exam neuro exam is benign.  Lungs are clear and O2 sats are reassuring    Differential diagnosis would include but is not limited to the following:    Rib fracture  Pneumothorax  Hemothorax  Intracranial hemorrhage or fracture [DB]   1241 Right rib detail reviewed with radiologist shows no obvious rib fracture.  No obvious hemothorax or pneumothorax. [DB]   1425 I discussed head CT with reading radiologist Dr. Brian Nolen.  There are some scattered areas of subarachnoid hemorrhage noted on images.  We will go ahead and draw some blood work and contact neurosurgery.  Patient seems to be neurologically intact but will need hospital admission for work-up of traumatic subarachnoid hemorrhage. [DB]   1427 Delgado and Urbano Grade:  The Delgado and Urbano grade for this patient is: Grade 1: Asymptomatic or mild headache and slight nuchal rigidity at 14:27 EST on 12/25/22.   [DB]   1427 Patient told me on multiple occasions that he was not taking any strong blood thinners but on closer review of his medical records he is apparently taking Plavix and aspirin.  This information was shared with neurosurgeon Dr. Brian Joiner.  He feels no indication for reversal at this time as patient is well-appearing with benign neuro exam. [DB]   1432 I spoke with Dr. West from the neurosurgery service who would like patient be admitted to the ICU.  He is going to write for  patient to be on 5 days worth of Keppra. [DB]   4683 I spoke with Dr. Eze Cortes who will admit on behalf of the ICU. [DB]      ED Course User Index  [DB] Hernando Amaro MD       AS OF 15:14 EST VITALS:    BP - 153/83  HR - 74  TEMP - 96.6 °F (35.9 °C) (Tympanic)  O2 SATS - 95%      DIAGNOSIS  Final diagnoses:   Subarachnoid hemorrhage following injury, no loss of consciousness, initial encounter ([facility])   Rib pain on right side         DISPOSITION  ICU admission         Hernando Amaro MD  12/25/22 5078

## 2022-12-25 NOTE — ED NOTES
Nursing report ED to floor  Devin Scales  82 y.o.  male    HPI :   Chief Complaint   Patient presents with    Fall       Admitting doctor:   Eze Cortes MD    Admitting diagnosis:   The primary encounter diagnosis was Subarachnoid hemorrhage following injury, no loss of consciousness, initial encounter ([facility]). A diagnosis of Rib pain on right side was also pertinent to this visit.    Code status:   Current Code Status       Date Active Code Status Order ID Comments User Context       Not on file            Allergies:   Patient has no known allergies.    Isolation:   No active isolations    Intake and Output  No intake or output data in the 24 hours ending 12/25/22 1530    Weight:   There were no vitals filed for this visit.    Most recent vitals:   Vitals:    12/25/22 1331 12/25/22 1401 12/25/22 1431 12/25/22 1501   BP: 162/77 169/93 170/85 164/76   Pulse: 72 81 88 84   Resp:    16   Temp:       TempSrc:       SpO2: 94% 96% 96% 96%       Active LDAs/IV Access:   Lines, Drains & Airways       Active LDAs       None                    Labs (abnormal labs have a star):   Labs Reviewed   COMPREHENSIVE METABOLIC PANEL - Abnormal; Notable for the following components:       Result Value    Chloride 108 (*)     Total Bilirubin 1.5 (*)     All other components within normal limits    Narrative:     GFR Normal >60  Chronic Kidney Disease <60  Kidney Failure <15    The GFR formula is only valid for adults with stable renal function between ages 18 and 70.   CBC WITH AUTO DIFFERENTIAL - Abnormal; Notable for the following components:    MCV 99.8 (*)     Lymphocyte % 19.3 (*)     Neutrophils, Absolute 7.07 (*)     All other components within normal limits   PROTIME-INR - Normal   CBC AND DIFFERENTIAL    Narrative:     The following orders were created for panel order CBC & Differential.  Procedure                               Abnormality         Status                     ---------                               -----------          ------                     CBC Auto Differential[[id number]]        Abnormal            Final result                 Please view results for these tests on the individual orders.       EKG:   No orders to display       Meds given in ED:   Medications   sodium chloride 0.9 % flush 10 mL (has no administration in time range)   levETIRAcetam (KEPPRA) tablet 500 mg (has no administration in time range)       Imaging results:  CT Head Without Contrast    Result Date: 12/25/2022  FINDINGS AND IMPRESSION: There are scattered small foci of subarachnoid hemorrhage within the right frontal lobe. There is no midline shift or hydrocephalus.  The above findings were discussed with Dr. Amaro by telephone by Brian Nolen at 2:20 PM on 12/25/2022    .  This report was finalized on 12/25/2022 3:17 PM by Dr. Brian Nolen M.D.       Ambulatory status:   - ax1    Social issues:   Social History     Socioeconomic History    Marital status: Single   Tobacco Use    Smoking status: Every Day    Smokeless tobacco: Never   Substance and Sexual Activity    Alcohol use: Yes     Comment: SOCIALLY    Drug use: No    Sexual activity: Defer       NIH Stroke Scale:         Registered Nurse, RN  12/25/22 15:30 EST

## 2022-12-26 ENCOUNTER — APPOINTMENT (OUTPATIENT)
Dept: CT IMAGING | Facility: HOSPITAL | Age: 82
End: 2022-12-26
Payer: MEDICARE

## 2022-12-26 VITALS
RESPIRATION RATE: 15 BRPM | WEIGHT: 138.45 LBS | HEART RATE: 72 BPM | OXYGEN SATURATION: 95 % | TEMPERATURE: 97.5 F | BODY MASS INDEX: 20.45 KG/M2 | DIASTOLIC BLOOD PRESSURE: 66 MMHG | SYSTOLIC BLOOD PRESSURE: 115 MMHG

## 2022-12-26 PROBLEM — F17.210 CIGARETTE SMOKER: Status: ACTIVE | Noted: 2022-12-26

## 2022-12-26 PROBLEM — I73.9 PERIPHERAL VASCULAR DISEASE: Status: ACTIVE | Noted: 2022-12-26

## 2022-12-26 PROBLEM — F10.20 ALCOHOL DEPENDENCE: Status: ACTIVE | Noted: 2022-12-26

## 2022-12-26 PROBLEM — J44.9 COPD (CHRONIC OBSTRUCTIVE PULMONARY DISEASE): Status: ACTIVE | Noted: 2022-12-26

## 2022-12-26 LAB
GLUCOSE BLDC GLUCOMTR-MCNC: 80 MG/DL (ref 70–130)
GLUCOSE BLDC GLUCOMTR-MCNC: 95 MG/DL (ref 70–130)

## 2022-12-26 PROCEDURE — 82962 GLUCOSE BLOOD TEST: CPT

## 2022-12-26 PROCEDURE — 70450 CT HEAD/BRAIN W/O DYE: CPT

## 2022-12-26 PROCEDURE — 99212 OFFICE O/P EST SF 10 MIN: CPT | Performed by: NURSE PRACTITIONER

## 2022-12-26 RX ORDER — LEVETIRACETAM 500 MG/1
500 TABLET ORAL 2 TIMES DAILY
Qty: 8 TABLET | Refills: 0 | Status: SHIPPED | OUTPATIENT
Start: 2022-12-26 | End: 2022-12-30

## 2022-12-26 RX ORDER — ONDANSETRON 2 MG/ML
4 INJECTION INTRAMUSCULAR; INTRAVENOUS EVERY 4 HOURS PRN
Status: DISCONTINUED | OUTPATIENT
Start: 2022-12-26 | End: 2022-12-26 | Stop reason: HOSPADM

## 2022-12-26 RX ADMIN — LEVETIRACETAM 500 MG: 500 TABLET, FILM COATED ORAL at 08:42

## 2022-12-26 RX ADMIN — PROPRANOLOL HYDROCHLORIDE 80 MG: 80 CAPSULE, EXTENDED RELEASE ORAL at 08:42

## 2022-12-26 RX ADMIN — ATORVASTATIN CALCIUM 80 MG: 80 TABLET, FILM COATED ORAL at 08:42

## 2022-12-26 RX ADMIN — HYDROCODONE BITARTRATE AND ACETAMINOPHEN 1 TABLET: 10; 325 TABLET ORAL at 05:04

## 2022-12-26 NOTE — DISCHARGE SUMMARY
Date of Admission: 12/25/2022  Date of Discharge:  12/26/2022    Discharge Diagnosis:    Traumatic subarachnoid hemorrhage  COPD without exacerbation  Current cigarette smoker  Current regular use of alcohol  Peripheral vascular disease  Unclear if patient currently on clopidogrel at home    Hospital Course    Presenting Problem/History of Present Illness    82-year-old male who slipped on the ice yesterday and hit his head on the ground.  This morning he noticed some blood on his head and came into the ED.  CT head shows small traumatic subarachnoid hemorrhage.  Admitted to ICU for monitoring.  His med list shows that patient is on Plavix.  However patient strenuously denies being on this medication.  I do see it listed on multiple prior office notes from outside.  He follows with vascular surgery for peripheral vascular disease.  Vascular notes mention aspirin but his PCP office note does not include a list of his current medications.  We will need to clarify.  In any case he denies coronary artery disease and no prior stents to heart or 2 legs.  He does have peripheral vascular disease.  He is a current cigarette smoker.  He has COPD and uses inhalers at home but not oxygen.  He denies any history of cancer.  He drinks variably 3-5 drinks a day and had 4-5 alcoholic beverages last night.  He does not report any history of alcohol withdrawal.  No prior history of stroke    Subsequent Course of Management    Patient was observed overnight in the ICU.  His follow-up CT head is stable.  He has been ambulated around the ICU and is steady on his feet.  I talked to his ex-wife who currently lives in his house.  I explained the circumstances.  She explains the patient drinks a lot and is stubborn and does not listen to anything.  Strongly advised that patient should not drink anymore.  He needs to make appropriate follow-ups with neurosurgery as outlined below.  He needs to follow-up with his primary care physician in a  week.    Per Neurosurgery:  82-year-old male patient who presented to the ED on 12/25 after falling and hitting his head on the ice.  CT head imaging revealed multiple small right frontal lobe hemorrhages.  Patient was subsequently admitted to the ICU for close observation as home medications included both aspirin and Plavix, however patient adamantly denies that he is taking either of these medications.     CT head done this morning is stable with no new areas of hemorrhage.  Patient's neurological exam remains stable and intact.  From our standpoint, the patient is okay to be discharged home once medically appropriate.  Recommend continuing to hold Plavix until after follow-up visit with us in clinic.  There is nothing further from neurosurgical standpoint at this time so we will sign off, but remain available for any questions or concerns.    Examination on Date of Discharge    /66   Pulse 72   Temp 97.5 °F (36.4 °C) (Oral)   Resp 15   Wt 62.8 kg (138 lb 7.2 oz)   SpO2 95%   BMI 20.45 kg/m²     Physical Exam  HENT:      Head: Laceration present.   Eyes:      Pupils: Pupils are equal, round, and reactive to light.   Cardiovascular:      Rate and Rhythm: Normal rate and regular rhythm.      Heart sounds: No murmur heard.  Pulmonary:      Effort: Pulmonary effort is normal.      Breath sounds: Decreased breath sounds present.   Abdominal:      General: Bowel sounds are normal.      Palpations: Abdomen is soft. There is no mass.      Tenderness: There is no abdominal tenderness.   Musculoskeletal:         General: No swelling.   Neurological:      Mental Status: He is alert.         Test Results    Surgical Procedures Since Admission:                 Results from last 7 days   Lab Units 12/25/22  1433   WBC 10*3/mm3 10.02   HEMOGLOBIN g/dL 14.8   HEMATOCRIT % 45.4   PLATELETS 10*3/mm3 144       Results from last 7 days   Lab Units 12/25/22  1433   SODIUM mmol/L 145   POTASSIUM mmol/L 5.2   CHLORIDE mmol/L  108*   CO2 mmol/L 29.0   BUN mg/dL 19   CREATININE mg/dL 0.98             Microbiology Results (last 10 days)     ** No results found for the last 240 hours. **          XR Ribs Right With PA Chest    Result Date: 12/25/2022  XR RIBS RIGHT W PA CHEST-  Clinical: Right rib pain, fell  FINDINGS: Cardiac size within normal limits. No mediastinal or hilar abnormality. There is atherosclerotic calcification of the aorta. No pleural effusion, active airspace disease nor pneumothorax.  Satisfactory right rib detail, no fracture or bone lesion seen.  CONCLUSION: No active disease of the chest, satisfactory right rib detail.  This report was finalized on 12/25/2022 12:36 PM by Dr. Pete Georges M.D.      CT Head Without Contrast    Result Date: 12/26/2022  CT HEAD WITHOUT CONTRAST  HISTORY: Subarachnoid hemorrhage  COMPARISON: 12/25/2022  TECHNIQUE: Axial CT imaging was obtained through brain. No IV contrast was administered.  FINDINGS: Scattered foci of subarachnoid hemorrhage within the right frontal lobe appear to be stable when compared to prior exam. No new areas of hemorrhage are seen. Mucosal thickening is noted within the ethmoid sinuses. There is partial opacification of the left mastoid air cells. There is diffuse atrophy. There is periventricular and deep white matter microangiopathic change. There is no midline shift or mass effect. No definite new areas of hemorrhage are seen. There is scalp edema. There is a more focal soft tissue contusion suspected overlying the left parietal lobe.      No significant interval change.  Radiation dose reduction techniques were utilized, including automated exposure control and exposure modulation based on body size.  This report was finalized on 12/26/2022 5:24 AM by Dr. Marilu Uribe M.D.      CT Head Without Contrast    Result Date: 12/25/2022  CT HEAD  HISTORY:Fall, anticoagulation  TECHNIQUE: CT scan of the head was obtained with 3 mm axial images without intravenous  contrast.  Radiation dose reduction techniques were utilized, including automated exposure control and exposure modulation based on body size.  COMPARISON:None      FINDINGS AND IMPRESSION: There are scattered small foci of subarachnoid hemorrhage within the right frontal lobe. There is no midline shift or hydrocephalus.  The above findings were discussed with Dr. Amaro by telephone by Brian Nolen at 2:20 PM on 12/25/2022    .  This report was finalized on 12/25/2022 3:17 PM by Dr. Brian Nolen M.D.        Consulting Physician(s)             None                   Discharge Instructions      Dietary Orders (From admission, onward)     Start     Ordered    12/25/22 1647  Diet: Regular/House Diet; Texture: Regular Texture (IDDSI 7); Fluid Consistency: Thin (IDDSI 0)  Diet Effective Now        References:    Diet Order [facility]   Question Answer Comment   Diets: Regular/House Diet    Texture: Regular Texture (IDDSI 7)    Fluid Consistency: Thin (IDDSI 0)        12/25/22 1646                        Your medication list      ASK your doctor about these medications      Instructions Last Dose Given Next Dose Due   aspirin 81 MG EC tablet      Take 81 mg by mouth Daily.       atorvastatin 80 MG tablet  Commonly known as: LIPITOR      Take 80 mg by mouth Daily.       clopidogrel 75 MG tablet  Commonly known as: PLAVIX      Take 75 mg by mouth Daily.       propranolol LA 80 MG 24 hr capsule  Commonly known as: INDERAL LA      Take 80 mg by mouth Daily.       tamsulosin 0.4 MG capsule 24 hr capsule  Commonly known as: FLOMAX      Take 1 capsule by mouth Every Night.       traMADol 50 MG tablet  Commonly known as: ULTRAM      Take 50 mg by mouth Every 6 (Six) Hours As Needed for Moderate Pain .                      Condition on Discharge:  Stable     Eze Cortes MD  12/26/22  15:27 EST    Time: I spent over 30mins in the discharge planning of this patient.    Some of this encounter note is an electronic  transcription/translation of spoken language to printed text.

## 2022-12-26 NOTE — PLAN OF CARE
Goal Outcome Evaluation:         Patient vitals have been stable all night, no issues with BP all shift. Patient has been resting all night. CT this AM shows no change in bleeds and no new bleeds.

## 2022-12-26 NOTE — PROGRESS NOTES
[facility] NEUROSURGERY PROGRESS NOTE    PATIENT IDENTIFICATION:   Name:  Devin Scales      MRN:  [id number]     82 y.o.  male               CC: tSAH s/p GLF      Subjective     Interval History: Sitting up in bed eating breakfast.  Alert and oriented x3.  Denies headache.  Continues to deny taking Plavix at home.  No new complaints.  No acute events reported overnight.    ROS:  Constitutional: No fever, chills  HEENT: No headache, no vision changes, no tinnitus, no hearing difficulties  GI: No nausea, vomiting, no swallow difficulties  Neuro: No numbness, tingling, or weakness, no speech difficulties, no balance difficulties    Objective     Vital signs in last 24 hours:  Temp:  [96.6 °F (35.9 °C)-97.7 °F (36.5 °C)] 97.4 °F (36.3 °C)  Heart Rate:  [61-91] 72  Resp:  [16] 16  BP: ()/(56-96) 124/68      Intake/Output this shift:  No intake/output data recorded.      Intake/Output last 3 shifts:  I/O last 3 completed shifts:  In: 240 [P.O.:240]  Out: -     LABS:  Results from last 7 days   Lab Units 12/25/22  1433   WBC 10*3/mm3 10.02   HEMOGLOBIN g/dL 14.8   HEMATOCRIT % 45.4   PLATELETS 10*3/mm3 144     Results from last 7 days   Lab Units 12/25/22  1433   SODIUM mmol/L 145   POTASSIUM mmol/L 5.2   CHLORIDE mmol/L 108*   CO2 mmol/L 29.0   BUN mg/dL 19   CREATININE mg/dL 0.98   CALCIUM mg/dL 9.5   BILIRUBIN mg/dL 1.5*   ALK PHOS U/L 69   ALT (SGPT) U/L 38   AST (SGOT) U/L 34   GLUCOSE mg/dL 88          IMAGING STUDIES:  CT Head Without Contrast    Result Date: 12/26/2022  No significant interval change.  Radiation dose reduction techniques were utilized, including automated exposure control and exposure modulation based on body size.  This report was finalized on 12/26/2022 5:24 AM by Dr. Marilu Uribe M.D.      CT Head Without Contrast    Result Date: 12/25/2022  FINDINGS AND IMPRESSION: There are scattered small foci of subarachnoid hemorrhage within the right frontal lobe. There is no midline shift or  hydrocephalus.  The above findings were discussed with Dr. Amaro by telephone by Brian Nolen at 2:20 PM on 12/25/2022    .  This report was finalized on 12/25/2022 3:17 PM by Dr. Brian Nolen M.D.          I personally viewed and interpreted the patient's clinical data and CT head imaging and discussed the patient with Dr. Joiner.    Meds reviewed/changed: Yes    Scheduled Meds:atorvastatin, 80 mg, Oral, Daily  levETIRAcetam, 500 mg, Oral, BID  propranolol LA, 80 mg, Oral, Daily  tamsulosin, 0.4 mg, Oral, Nightly      Continuous Infusions:   PRN Meds:.•  hydrALAZINE  •  HYDROcodone-acetaminophen  •  [COMPLETED] Insert Peripheral IV **AND** sodium chloride      Physical Exam:    General:   Awake, alert, oriented x3. Speech clear with no aphasia  HEENT:    Posterior left head scalp hematoma  Neck:    Supple  CN III IV VI: Extraocular movements are full , PERRL   CN V: Normal facial sensation and strength of muscles of mastication.  CN VII: Facial movements are symmetric. No weakness.      Motor: Normal muscle strength, bulk and tone in upper and lower extremities.    Sensation: Normal to light touch; no extinction  Station and Gait: Gait not tested  Coordination:  Finger-to-nose and heel-to-shin test shows no dysmetria.    Extremities:   Wearing SCDs    Assessment & Plan     ASSESSMENT:      Subarachnoid hemorrhage following injury, no loss of consciousness, initial encounter (HCC)    82-year-old male patient who presented to the ED on 12/25 after falling and hitting his head on the ice.  CT head imaging revealed multiple small right frontal lobe hemorrhages.  Patient was subsequently admitted to the ICU for close observation as home medications included both aspirin and Plavix, however patient adamantly denies that he is taking either of these medications.    CT head done this morning is stable with no new areas of hemorrhage.  Patient's neurological exam remains stable and intact.  From our standpoint, the  patient is okay to be discharged home once medically appropriate.  Recommend continuing to hold Plavix until after follow-up visit with us in clinic.  There is nothing further from neurosurgical standpoint at this time so we will sign off, but remain available for any questions or concerns.    PLAN:   -Follow-up with us in clinic in 2 weeks with repeat head CT imaging  -Continue to hold Plavix until after this follow-up imaging.   -Will discuss appropriateness of re-initiation of Plavix at follow-up visit  -Our office will contact patient with appointment details    I discussed the patient's findings and my recommendations with patient, nursing staff and Dr. Joiner       LOS: 1 day       Lakesha Doherty, APRN  12/26/2022  09:29 EST    \"Dictated utilizing Dragon dictation\".

## 2022-12-26 NOTE — PROGRESS NOTES
Clinical Pharmacy Services: Medication History    Devin Scales is a 82 y.o. male presenting to [facility] for Subarachnoid hemorrhage following injury, no loss of consciousness, initial encounter ([facility]) [S06.6X0A]  Rib pain on right side [R07.81]    He  has a past medical history of Benign essential tremor, BPH (benign prostatic hyperplasia), Chest pain, Dyspnea, Fatigue, and Hyperlipidemia.    Allergies as of 12/25/2022   • (No Known Allergies)       Medication information was obtained from: Pt family, [facility]   Pharmacy and Phone Number: [facility]    Prior to Admission Medications     Prescriptions Last Dose Informant Patient Reported? Taking?    atorvastatin (LIPITOR) 80 MG tablet  Self Yes Yes    Take 80 mg by mouth Daily.    propranolol LA (INDERAL LA) 80 MG 24 hr capsule  Self Yes Yes    Take 80 mg by mouth Daily.    aspirin 81 MG EC tablet  Self Yes No    Take 81 mg by mouth Daily.    clopidogrel (PLAVIX) 75 MG tablet  Self Yes No    Take 75 mg by mouth Daily.    tamsulosin (FLOMAX) 0.4 MG capsule 24 hr capsule  Self Yes No    Take 1 capsule by mouth Every Night.    traMADol (ULTRAM) 50 MG tablet   Yes No    Take 50 mg by mouth Every 6 (Six) Hours As Needed for Moderate Pain .            Medication notes: [facility] reported pt had not filled anything since Oct. Pt family confirmed prescriptions by reporting what medication bottles he still had.     This medication list is complete to the best of my knowledge as of 12/26/2022    Please call if questions.    Marifer Gordon, PharmJAIME   12/26/2022 14:54 EST

## 2022-12-26 NOTE — CASE MANAGEMENT/SOCIAL WORK
Case Management Discharge Note      Final Note: Pt discharged home, 12/26/2022 - Holly TALLEY /INA MACHADO.         Selected Continued Care - Discharged on 12/26/2022 Admission date: 12/25/2022 - Discharge disposition: Home or Self Care    Destination    No services have been selected for the patient.              Durable Medical Equipment    No services have been selected for the patient.              Dialysis/Infusion    No services have been selected for the patient.              Home Medical Care    No services have been selected for the patient.              Therapy    No services have been selected for the patient.              Community Resources    No services have been selected for the patient.              Community & DME    No services have been selected for the patient.                       Final Discharge Disposition Code: 01 - home or self-care

## 2022-12-27 ENCOUNTER — TELEPHONE (OUTPATIENT)
Dept: NEUROSURGERY | Facility: CLINIC | Age: 82
End: 2022-12-27

## 2022-12-27 DIAGNOSIS — S06.6X0A SUBARACHNOID HEMORRHAGE FOLLOWING INJURY, NO LOSS OF CONSCIOUSNESS, INITIAL ENCOUNTER: Primary | ICD-10-CM

## 2022-12-27 NOTE — TELEPHONE ENCOUNTER
Bryanna schedule HFU with new head CT  AFTER January 12th.     I called and spoke with  Yordy and advised him of CT of CT appointment. He voiced understanding.

## 2022-12-27 NOTE — TELEPHONE ENCOUNTER
----- Message from SONIYA Orantes sent at 12/26/2022 11:27 AM EST -----  Regarding: HFU  Please schedule patient hospital follow-up visit in 2 weeks with repeat head CT imaging at that time.  Patient was seen in the hospital after a fall on the ice, hitting his head with multiple small right frontal lobe hemorrhages.  Patient may or may not have been taking Plavix.  Plavix was noted to be on the patient's home medication list, however patient adamantly denies he was taking this.

## 2023-01-03 ENCOUNTER — TELEPHONE (OUTPATIENT)
Dept: NEUROSURGERY | Facility: CLINIC | Age: 83
End: 2023-01-03
Payer: MEDICARE

## 2023-01-03 NOTE — TELEPHONE ENCOUNTER
Caller: Kena Corrales    Relationship: Self    Best call back number: [phone]    What is the best time to reach you: ANY TIME     Who are you requesting to speak with (clinical staff, provider,  specific staff member): CLINICAL     Do you know the name of the person who called: KENA CORRALES     What was the call regarding: BLOOD IN URINE    Do you require a callback: YES    PATIENT CALLED IN AND STATES HE HAS BEEN HAVING BLOOD IN HIS URINE THAT COMES AND GOES - WANTS TO KNOW WHY THIS IS HAPPENING - ATTEMPTED WT - PER MAREAH SENDING ENCOUNTER -  PLEASE CALL BACK WITH ANY ADVICE.  THANK YOU!

## 2023-01-12 ENCOUNTER — HOSPITAL ENCOUNTER (OUTPATIENT)
Dept: CT IMAGING | Facility: HOSPITAL | Age: 83
Discharge: HOME | End: 2023-01-12
Admitting: NURSE PRACTITIONER
Payer: MEDICARE

## 2023-01-12 ENCOUNTER — APPOINTMENT (OUTPATIENT)
Dept: CT IMAGING | Facility: HOSPITAL | Age: 83
End: 2023-01-12
Payer: MEDICARE

## 2023-01-12 DIAGNOSIS — S06.6X0A SUBARACHNOID HEMORRHAGE FOLLOWING INJURY, NO LOSS OF CONSCIOUSNESS, INITIAL ENCOUNTER: ICD-10-CM

## 2023-01-12 PROCEDURE — 70450 CT HEAD/BRAIN W/O DYE: CPT

## 2023-01-16 ENCOUNTER — OFFICE VISIT (OUTPATIENT)
Dept: NEUROSURGERY | Facility: CLINIC | Age: 83
End: 2023-01-16
Payer: MEDICARE

## 2023-01-16 VITALS
HEART RATE: 69 BPM | BODY MASS INDEX: 19.99 KG/M2 | DIASTOLIC BLOOD PRESSURE: 70 MMHG | WEIGHT: 135 LBS | SYSTOLIC BLOOD PRESSURE: 114 MMHG | OXYGEN SATURATION: 99 % | TEMPERATURE: 97.1 F | HEIGHT: 69 IN

## 2023-01-16 DIAGNOSIS — S06.6X0D SUBARACHNOID HEMORRHAGE FOLLOWING INJURY, NO LOSS OF CONSCIOUSNESS, SUBSEQUENT ENCOUNTER: Primary | ICD-10-CM

## 2023-01-16 PROCEDURE — 99213 OFFICE O/P EST LOW 20 MIN: CPT | Performed by: NURSE PRACTITIONER

## 2023-01-16 RX ORDER — FUROSEMIDE 40 MG/1
TABLET ORAL
COMMUNITY
Start: 2022-10-12

## 2023-01-16 RX ORDER — ACETAMINOPHEN AND CODEINE PHOSPHATE 300; 30 MG/1; MG/1
1-2 TABLET ORAL EVERY 6 HOURS PRN
COMMUNITY
Start: 2023-01-04

## 2023-01-16 RX ORDER — LORATADINE 10 MG/1
TABLET ORAL
COMMUNITY

## 2023-01-16 NOTE — PROGRESS NOTES
Subjective   Patient ID: Devin Scales is a 82 y.o. male is here today for follow-up from recent hospital consultation for traumatic subarachnoid hemorrhage.  He is here today with a new head CT.    History of Present Illness     Mr. Scales states that he is feeling quite well.  He sustained a fall while trying to clear off his driveway.  He slipped and fell on the ice.  He was evaluated by neurosurgery for traumatic subarachnoid hemorrhage in the right frontal region.  There was some question as to whether or not he had been taking Plavix and aspirin.  In my review of the patient notes in care everywhere, the patient was only taking a baby aspirin for diagnosis of peripheral vascular disease.  His vascular specialist is Dr. Kim Alvarez at [facility].  The patient states that he is still working on smoking cessation.  He used to smoke 1 pack of cigarettes daily and is now down to 3 or 4 cigarettes a day.  His next follow-up with Dr. Alvarez is in May 2023.  Mr. Alvarez denies any headache, nausea, vomiting.  He also denies any visual disturbance or mental status changes.  He is having no difficulty with balance or gait.    The following portions of the patient's history were reviewed and updated as appropriate: allergies, current medications, past family history, past medical history, past social history, past surgical history and problem list.    Review of Systems   Constitutional: Negative.  Negative for chills, diaphoresis and fever.   HENT: Negative.  Negative for rhinorrhea, sore throat and trouble swallowing.    Eyes: Negative.    Respiratory: Negative.  Negative for cough, choking, chest tightness and shortness of breath.    Cardiovascular: Negative.  Negative for chest pain.   Gastrointestinal: Negative.  Negative for abdominal distention, abdominal pain, nausea and vomiting.   Endocrine: Negative.    Genitourinary: Positive for hematuria (He had developed some hematuria after the fall but it  "resolved on it's own accoriding to office visit note with PCP, Dr. Baldev Kothari. ). Negative for urgency.   Musculoskeletal: Negative.  Negative for back pain and gait problem.   Skin: Negative.    Allergic/Immunologic: Negative.    Neurological: Negative.  Negative for dizziness, tremors, seizures, syncope, speech difficulty, weakness, light-headedness and headaches.   Hematological: Negative.    Psychiatric/Behavioral: Negative.  Negative for agitation, behavioral problems and confusion.   All other systems reviewed and are negative.          Objective     Vitals:    01/16/23 1050   BP: 114/70   Pulse: 69   Temp: 97.1 °F (36.2 °C)   SpO2: 99%   Weight: 61.2 kg (135 lb)   Height: 175.3 cm (69.02\")     Body mass index is 19.93 kg/m².    Tobacco Use: High Risk   • Smoking Tobacco Use: Every Day   • Smokeless Tobacco Use: Never   • Passive Exposure: Not on file          Physical Exam  Vitals reviewed.   Constitutional:       General: He is not in acute distress.     Appearance: Normal appearance. He is well-developed and normal weight. He is not ill-appearing, toxic-appearing or diaphoretic.   HENT:      Head: Normocephalic and atraumatic.      Nose: Nose normal.      Mouth/Throat:      Mouth: Mucous membranes are moist.   Eyes:      General:         Right eye: No discharge.         Left eye: No discharge.      Extraocular Movements: Extraocular movements intact.      Conjunctiva/sclera: Conjunctivae normal.      Pupils: Pupils are equal, round, and reactive to light.   Neck:      Trachea: No tracheal deviation.   Cardiovascular:      Rate and Rhythm: Normal rate.   Pulmonary:      Effort: Pulmonary effort is normal. No respiratory distress.   Abdominal:      General: There is no distension.      Palpations: Abdomen is soft.      Tenderness: There is no abdominal tenderness.   Musculoskeletal:         General: No tenderness. Normal range of motion.      Cervical back: Normal range of motion and neck supple.   Skin:   "   General: Skin is warm and dry.      Findings: No erythema.   Neurological:      General: No focal deficit present.      Mental Status: He is alert and oriented to person, place, and time. Mental status is at baseline.      GCS: GCS eye subscore is 4. GCS verbal subscore is 5. GCS motor subscore is 6.      Cranial Nerves: No cranial nerve deficit.      Sensory: No sensory deficit.      Motor: No weakness or abnormal muscle tone.      Coordination: Coordination normal.      Gait: Gait normal.      Deep Tendon Reflexes: Reflexes are normal and symmetric. Reflexes normal.      Comments: AA&O x 3.  Speech is normal.  Converses appropriately.  PERRL. EOM's intact. Face symmetric. Tongue midline without fasiculations or atrophy. Sensation equal and intact throughout the face.  Hearing is intact bilaterally to finger rustle.  Negative Romberg's.  Negative pronator drift.  No dysmetria. Gait is normal. No motor or sensory deficits. DTR's normal. Negative Malhotra's; negative clonus.    Psychiatric:         Behavior: Behavior normal. Behavior is cooperative.         Thought Content: Thought content normal.       Neurologic Exam     Mental Status   Oriented to person, place, and time.     Cranial Nerves     CN III, IV, VI   Pupils are equal, round, and reactive to light.    Assessment & Plan   Independent Review of Radiographic Studies:      I personally reviewed the images from the following studies.  I independently reviewed CT imaging of the head without contrast dated January 12, 2023 today in the office.  The CT was also compared to prior study dated December 26, 2022.    The head CT shows resolution of the previous posttraumatic subarachnoid hemorrhage in the right frontal lobe region.  There is no new evidence of hemorrhage or other acute abnormality.    Medical Decision Making:      Mr. Scales returns to the office today for follow-up from recent hospital consultation for right frontal traumatic subarachnoid  hemorrhage.  The subarachnoid hemorrhage was the result of a fall after slipping on the ice.  There was some confusion as to whether or not the patient was on an antiplatelet agent.  From review of the chart, it appears he had only been taking a daily baby aspirin.  Today's head CT shows complete resolution of the right frontal traumatic subarachnoid hemorrhage.  I informed the patient that from a neurosurgical standpoint, he is free to resume the baby aspirin at any time.  He does not need any further neurosurgical follow-up however should there be any questions or concerns, I encouraged Mr. Scales to call our office.  I will send a copy of today's note to his primary care provider Dr. Baldev Kothari. We will keep it open-ended from here.      Diagnoses and all orders for this visit:    1. Subarachnoid hemorrhage following injury, no loss of consciousness, subsequent encounter (Primary)  Comments:  resolved        Return if symptoms worsen or fail to improve.

## 2023-03-10 ENCOUNTER — TELEPHONE (OUTPATIENT)
Dept: NEUROSURGERY | Facility: CLINIC | Age: 83
End: 2023-03-10
Payer: MEDICARE

## 2023-03-10 DIAGNOSIS — G96.00 CSF LEAK: Primary | ICD-10-CM

## 2023-03-10 NOTE — TELEPHONE ENCOUNTER
I called patient to schedule FU with Dr. Joiner. Patient was unaware on why he needed to follow-up and wanted me to reach out to referring. I called referring and let them know patient was unaware and we would appreciate if someone would reach out to him to let him know why he is being referred back to us.    Patient will need to be scheduled for head CT and FU with Dr. Joiner 8am on Tuesday 03/14/23

## 2023-03-10 NOTE — TELEPHONE ENCOUNTER
Tawana from referring office called back and said that patient is aware. I called patient and he is scheduled and aware.

## 2023-03-13 NOTE — PROGRESS NOTES
Patient ID: Devin Scales is a 82 y.o. male is an established patient with follow up with CT Head WO 3/14/23    Today, Devin Scales reports headaches that start forehead that radiates around his head. He reports pressure behind bilateral eye which causes blurry vision.  He reports when he has his headaches he gets dizzy and off balance and fearful to walk.      History of Present Illness  Devin started developing headaches a few days after his accident and he has noted occasional drainage from his nose especially in the morning after he wakes up where he finds a large amount of clear fluid coming from his nose.  This is associated with headaches and dizziness.      Review of Systems   Constitutional: Positive for activity change and fatigue.   HENT: Positive for hearing loss and sinus pain. Negative for ear pain and sinus pressure.    Eyes: Positive for pain and visual disturbance. Negative for photophobia.   Respiratory: Positive for chest tightness and shortness of breath.    Cardiovascular: Negative.    Gastrointestinal: Negative for constipation, diarrhea, nausea and vomiting.   Endocrine: Negative.    Genitourinary: Positive for urgency. Negative for difficulty urinating.   Musculoskeletal: Negative for neck stiffness.   Allergic/Immunologic: Negative.    Neurological: Positive for dizziness, light-headedness and headaches.   Psychiatric/Behavioral: Positive for confusion. Negative for agitation and sleep disturbance.        While in the room and during my examination of the patient I wore a mask and eye protection.  I washed my hands before and after this patient encounter.  The patient was also wearing a mask.    The following portions of the patient's history were reviewed and updated as appropriate: allergies, current medications, past family history, past medical history, past social history, past surgical history and problem list.     Objective:    Vitals:    03/15/23 1111   BP: 124/62   Pulse: 68    Temp: 97.7 °F (36.5 °C)   SpO2: 98%     There is no height or weight on file to calculate BMI.    Physical Exam  Constitutional:       Appearance: Normal appearance.   HENT:      Head: Normocephalic and atraumatic.   Eyes:      Extraocular Movements: Extraocular movements intact.      Conjunctiva/sclera: Conjunctivae normal.      Pupils: Pupils are equal, round, and reactive to light.   Cardiovascular:      Rate and Rhythm: Normal rate and regular rhythm.      Pulses: Normal pulses.   Pulmonary:      Breath sounds: Normal breath sounds.   Abdominal:      Palpations: Abdomen is soft.   Musculoskeletal:         General: Normal range of motion.      Cervical back: Normal range of motion and neck supple.   Skin:     General: Skin is warm and dry.   Neurological:      Mental Status: He is alert and oriented to person, place, and time.      Cranial Nerves: Cranial nerves 2-12 are intact.      Motor: Motor function is intact. No weakness or atrophy.      Coordination: Coordination is intact. Romberg sign negative. Romberg Test normal.      Gait: Gait is intact. Gait normal.      Deep Tendon Reflexes: Reflexes are normal and symmetric.      Reflex Scores:       Tricep reflexes are 2+ on the right side and 2+ on the left side.       Bicep reflexes are 2+ on the right side and 2+ on the left side.       Brachioradialis reflexes are 2+ on the right side and 2+ on the left side.       Patellar reflexes are 2+ on the right side and 2+ on the left side.       Achilles reflexes are 2+ on the right side and 2+ on the left side.  Psychiatric:         Speech: Speech normal.       Neurologic Exam     Mental Status   Oriented to person, place, and time.   Attention: normal. Concentration: normal.   Speech: speech is normal   Level of consciousness: alert    Cranial Nerves   Cranial nerves II through XII intact.     CN III, IV, VI   Pupils are equal, round, and reactive to light.    Motor Exam   Muscle bulk: normal  Overall muscle  tone: normal    Strength   Strength 5/5 except as noted.     Sensory Exam   Light touch normal.     Gait, Coordination, and Reflexes     Gait  Gait: normal    Coordination   Romberg: negative    Reflexes   Reflexes 2+ except as noted.   Right brachioradialis: 2+  Left brachioradialis: 2+  Right biceps: 2+  Left biceps: 2+  Right triceps: 2+  Left triceps: 2+  Right patellar: 2+  Left patellar: 2+  Right achilles: 2+  Left achilles: 2+       Results: CT head without contrast demonstrates no obvious evidence of a skull fracture or pneumocephalus, resolution of previous traumatic subarachnoid hemorrhage    Assessment/Plan: Based on his exam and clinical history, I am concerned for a occult CSF leak due to a skull base fracture or encephalocele.  Unfortunately, I am not able to express CSF even after a 5-minute stress test in the office nor my able to localize the leak to a fracture or area of pneumocephalus on the head CT.  I do think that the majority of these very small occult CSF leaks are able to resolve spontaneously and we should give him some more time to resolve this.  I like to see him back in 4 weeks in this time with a high-resolution sinus CT with coronal slices through the skull base.  If he continues to have leakage we can do a nuclear medicine study, a fluorescein study with the help of Dr. Montez or just a lumbar drain trial to help stop the leakage.       Diagnoses and all orders for this visit:    1. CSF leak (Primary)  -     CT sinus wo contrast; Future                Brian Joiner MD  03/15/23  16:00 EDT

## 2023-03-14 ENCOUNTER — HOSPITAL ENCOUNTER (OUTPATIENT)
Dept: CT IMAGING | Facility: HOSPITAL | Age: 83
Discharge: HOME | End: 2023-03-14
Admitting: NEUROLOGICAL SURGERY
Payer: MEDICARE

## 2023-03-14 DIAGNOSIS — G96.00 CSF LEAK: ICD-10-CM

## 2023-03-14 PROCEDURE — 70450 CT HEAD/BRAIN W/O DYE: CPT

## 2023-03-15 ENCOUNTER — OFFICE VISIT (OUTPATIENT)
Dept: NEUROSURGERY | Facility: CLINIC | Age: 83
End: 2023-03-15
Payer: MEDICARE

## 2023-03-15 VITALS
OXYGEN SATURATION: 98 % | DIASTOLIC BLOOD PRESSURE: 62 MMHG | TEMPERATURE: 97.7 F | SYSTOLIC BLOOD PRESSURE: 124 MMHG | HEART RATE: 68 BPM

## 2023-03-15 DIAGNOSIS — G96.00 CSF LEAK: Primary | ICD-10-CM

## 2023-03-15 PROCEDURE — 1159F MED LIST DOCD IN RCRD: CPT | Performed by: NEUROLOGICAL SURGERY

## 2023-03-15 PROCEDURE — 1160F RVW MEDS BY RX/DR IN RCRD: CPT | Performed by: NEUROLOGICAL SURGERY

## 2023-03-15 PROCEDURE — 99214 OFFICE O/P EST MOD 30 MIN: CPT | Performed by: NEUROLOGICAL SURGERY

## 2023-03-15 RX ORDER — CHLORCYCLIZINE HYDROCHLORIDE AND PSEUDOEPHEDRINE HYDROCHLORIDE 25; 60 MG/1; MG/1
1 TABLET ORAL EVERY 12 HOURS SCHEDULED
COMMUNITY
Start: 2023-02-10

## 2023-03-15 RX ORDER — CHLORCYCLIZINE HYDROCHLORIDE AND PSEUDOEPHEDRINE HYDROCHLORIDE 25; 60 MG/1; MG/1
TABLET ORAL
COMMUNITY
Start: 2023-02-10

## 2023-04-14 ENCOUNTER — HOSPITAL ENCOUNTER (OUTPATIENT)
Dept: CT IMAGING | Facility: HOSPITAL | Age: 83
Discharge: HOME | End: 2023-04-14
Admitting: NEUROLOGICAL SURGERY
Payer: MEDICARE

## 2023-04-14 DIAGNOSIS — G96.00 CSF LEAK: ICD-10-CM

## 2023-04-14 PROCEDURE — 70486 CT MAXILLOFACIAL W/O DYE: CPT

## 2023-04-17 NOTE — PROGRESS NOTES
Patient ID: Devin Scales is a 82 y.o. male is an established patient with     1 month follow up with  CT Sinus WO 4/14/23, CT Head WO 3/14/23.    Today, Devin Scales reports pressure headaches in frontal forehead.  He reports pressure behind bilateral eyes. He reports dizziness, visual disturbance spinning wheels.  He feels off balance from a sitting position to get up.    Subjective:     History of Present Illness  Devin continues to report headaches and leakage of high volume of clear fluid from his nose.  He does remember that he used to have some drainage of his fluid but not nearly as bad prior to his accident.      Review of Systems   Constitutional: Positive for activity change.   HENT: Positive for postnasal drip, sinus pressure and sinus pain.    Eyes: Positive for pain and visual disturbance.   Respiratory: Positive for shortness of breath. Negative for chest tightness.    Cardiovascular: Negative.    Gastrointestinal: Negative for constipation, diarrhea, nausea and vomiting.   Endocrine: Negative.    Genitourinary: Negative for difficulty urinating, frequency and urgency.   Musculoskeletal: Positive for neck stiffness.   Allergic/Immunologic: Negative.    Neurological: Positive for dizziness, light-headedness and headaches.   Psychiatric/Behavioral: Positive for confusion. Negative for sleep disturbance.        While in the room and during my examination of the patient I wore a mask and eye protection.  I washed my hands before and after this patient encounter.  The patient was also wearing a mask.    The following portions of the patient's history were reviewed and updated as appropriate: allergies, current medications, past family history, past medical history, past social history, past surgical history and problem list.     Objective:    Vitals:    04/20/23 0951   BP: 128/64   Pulse: 69   SpO2: 97%     There is no height or weight on file to calculate BMI.    Physical Exam  Constitutional:        Appearance: Normal appearance.   HENT:      Head: Normocephalic and atraumatic.   Eyes:      Extraocular Movements: Extraocular movements intact.      Conjunctiva/sclera: Conjunctivae normal.      Pupils: Pupils are equal, round, and reactive to light.   Cardiovascular:      Rate and Rhythm: Normal rate and regular rhythm.      Pulses: Normal pulses.   Pulmonary:      Breath sounds: Normal breath sounds.   Abdominal:      Palpations: Abdomen is soft.   Musculoskeletal:         General: Normal range of motion.      Cervical back: Normal range of motion and neck supple.   Skin:     General: Skin is warm and dry.   Neurological:      Mental Status: He is alert and oriented to person, place, and time.      Cranial Nerves: Cranial nerves 2-12 are intact.      Motor: Motor function is intact. No weakness or atrophy.      Coordination: Coordination is intact. Romberg sign negative. Romberg Test normal.      Gait: Gait is intact. Gait normal.      Deep Tendon Reflexes: Reflexes are normal and symmetric.      Reflex Scores:       Tricep reflexes are 2+ on the right side and 2+ on the left side.       Bicep reflexes are 2+ on the right side and 2+ on the left side.       Brachioradialis reflexes are 2+ on the right side and 2+ on the left side.       Patellar reflexes are 2+ on the right side and 2+ on the left side.       Achilles reflexes are 2+ on the right side and 2+ on the left side.  Psychiatric:         Speech: Speech normal.       Neurologic Exam     Mental Status   Oriented to person, place, and time.   Attention: normal. Concentration: normal.   Speech: speech is normal   Level of consciousness: alert    Cranial Nerves   Cranial nerves II through XII intact.     CN III, IV, VI   Pupils are equal, round, and reactive to light.    Motor Exam   Muscle bulk: normal  Overall muscle tone: normal    Strength   Strength 5/5 except as noted.     Sensory Exam   Light touch normal.     Gait, Coordination, and Reflexes      Gait  Gait: normal    Coordination   Romberg: negative    Reflexes   Reflexes 2+ except as noted.   Right brachioradialis: 2+  Left brachioradialis: 2+  Right biceps: 2+  Left biceps: 2+  Right triceps: 2+  Left triceps: 2+  Right patellar: 2+  Left patellar: 2+  Right achilles: 2+  Left achilles: 2+       Results: CT of the sinuses show no obvious bony dehiscence.  Resolution of previous pneumocephalus    Assessment/Plan: I have moderate suspicion that Devin has a CSF leak given his imaging findings and prior history of rhinitis.  My plan is to have him collect his drainage into specimen cup and deposited at a [facility] drop-off to be tested for beta transferrin.  If positive, we will plan for a lumbar drain trial while inpatient for several days to give his leak a chance to seal up.  If negative, we will attribute his drainage to rhinitis and his headaches and other disturbances to postconcussive symptoms.       Diagnoses and all orders for this visit:    1. CSF leak (Primary)  -     Beta 2 Transferrin - Body Fluid,; Future    2. Subarachnoid hemorrhage following injury, no loss of consciousness, initial encounter  -     Beta 2 Transferrin - Body Fluid,; Future                Brian Joiner MD  04/20/23  10:10 EDT

## 2023-04-20 ENCOUNTER — OFFICE VISIT (OUTPATIENT)
Dept: NEUROSURGERY | Facility: CLINIC | Age: 83
End: 2023-04-20
Payer: MEDICARE

## 2023-04-20 VITALS — SYSTOLIC BLOOD PRESSURE: 128 MMHG | OXYGEN SATURATION: 97 % | DIASTOLIC BLOOD PRESSURE: 64 MMHG | HEART RATE: 69 BPM

## 2023-04-20 DIAGNOSIS — G96.00 CSF LEAK: Primary | ICD-10-CM

## 2023-04-20 DIAGNOSIS — S06.6X0A SUBARACHNOID HEMORRHAGE FOLLOWING INJURY, NO LOSS OF CONSCIOUSNESS, INITIAL ENCOUNTER: ICD-10-CM

## 2023-04-20 RX ORDER — FLUTICASONE FUROATE, UMECLIDINIUM BROMIDE AND VILANTEROL TRIFENATATE 100; 62.5; 25 UG/1; UG/1; UG/1
1 POWDER RESPIRATORY (INHALATION) DAILY
COMMUNITY
Start: 2023-04-19 | End: 2023-07-18

## 2023-04-24 ENCOUNTER — LAB (OUTPATIENT)
Dept: LAB | Facility: HOSPITAL | Age: 83
End: 2023-04-24
Payer: MEDICARE

## 2023-04-24 DIAGNOSIS — G96.00 CSF LEAK: ICD-10-CM

## 2023-04-24 DIAGNOSIS — S06.6X0A SUBARACHNOID HEMORRHAGE FOLLOWING INJURY, NO LOSS OF CONSCIOUSNESS, INITIAL ENCOUNTER: ICD-10-CM

## 2023-04-24 PROCEDURE — 86335 IMMUNFIX E-PHORSIS/URINE/CSF: CPT

## 2023-04-27 LAB — B2 TRANSFERRIN FLD QL: NOT DETECTED

## 2023-05-31 ENCOUNTER — APPOINTMENT (OUTPATIENT)
Dept: GENERAL RADIOLOGY | Facility: HOSPITAL | Age: 83
End: 2023-05-31
Payer: MEDICARE

## 2023-05-31 ENCOUNTER — HOSPITAL ENCOUNTER (OUTPATIENT)
Facility: HOSPITAL | Age: 83
Setting detail: OBSERVATION
Discharge: HOME | End: 2023-06-01
Attending: EMERGENCY MEDICINE | Admitting: INTERNAL MEDICINE
Payer: MEDICARE

## 2023-05-31 DIAGNOSIS — N17.9 ACUTE KIDNEY INJURY: Primary | ICD-10-CM

## 2023-05-31 DIAGNOSIS — R77.8 ELEVATED TROPONIN: ICD-10-CM

## 2023-05-31 LAB
ALBUMIN SERPL-MCNC: 4.3 G/DL (ref 3.5–5.2)
ALBUMIN/GLOB SERPL: 2.2 G/DL
ALP SERPL-CCNC: 58 U/L (ref 39–117)
ALT SERPL W P-5'-P-CCNC: 21 U/L (ref 1–41)
ANION GAP SERPL CALCULATED.3IONS-SCNC: 9 MMOL/L (ref 5–15)
AST SERPL-CCNC: 26 U/L (ref 1–40)
BASOPHILS # BLD AUTO: 0.06 10*3/MM3 (ref 0–0.2)
BASOPHILS NFR BLD AUTO: 0.8 % (ref 0–1.5)
BILIRUB SERPL-MCNC: 1.5 MG/DL (ref 0–1.2)
BUN SERPL-MCNC: 21 MG/DL (ref 8–23)
BUN/CREAT SERPL: 16 (ref 7–25)
CALCIUM SPEC-SCNC: 9 MG/DL (ref 8.6–10.5)
CHLORIDE SERPL-SCNC: 104 MMOL/L (ref 98–107)
CO2 SERPL-SCNC: 24 MMOL/L (ref 22–29)
CREAT SERPL-MCNC: 1.31 MG/DL (ref 0.76–1.27)
DEPRECATED RDW RBC AUTO: 44 FL (ref 37–54)
EGFRCR SERPLBLD CKD-EPI 2021: 55 ML/MIN/1.73
EOSINOPHIL # BLD AUTO: 0.21 10*3/MM3 (ref 0–0.4)
EOSINOPHIL NFR BLD AUTO: 2.8 % (ref 0.3–6.2)
ERYTHROCYTE [DISTWIDTH] IN BLOOD BY AUTOMATED COUNT: 12.6 % (ref 12.3–15.4)
GEN 5 2HR TROPONIN T REFLEX: 31 NG/L
GLOBULIN UR ELPH-MCNC: 2 GM/DL
GLUCOSE BLDC GLUCOMTR-MCNC: 83 MG/DL (ref 70–130)
GLUCOSE SERPL-MCNC: 114 MG/DL (ref 65–99)
HCT VFR BLD AUTO: 40.3 % (ref 37.5–51)
HGB BLD-MCNC: 13.6 G/DL (ref 13–17.7)
HOLD SPECIMEN: NORMAL
IMM GRANULOCYTES # BLD AUTO: 0.02 10*3/MM3 (ref 0–0.05)
IMM GRANULOCYTES NFR BLD AUTO: 0.3 % (ref 0–0.5)
LIPASE SERPL-CCNC: 141 U/L (ref 13–60)
LYMPHOCYTES # BLD AUTO: 2.28 10*3/MM3 (ref 0.7–3.1)
LYMPHOCYTES NFR BLD AUTO: 30 % (ref 19.6–45.3)
MAGNESIUM SERPL-MCNC: 2 MG/DL (ref 1.6–2.4)
MCH RBC QN AUTO: 32.3 PG (ref 26.6–33)
MCHC RBC AUTO-ENTMCNC: 33.7 G/DL (ref 31.5–35.7)
MCV RBC AUTO: 95.7 FL (ref 79–97)
MONOCYTES # BLD AUTO: 0.52 10*3/MM3 (ref 0.1–0.9)
MONOCYTES NFR BLD AUTO: 6.9 % (ref 5–12)
NEUTROPHILS NFR BLD AUTO: 4.5 10*3/MM3 (ref 1.7–7)
NEUTROPHILS NFR BLD AUTO: 59.2 % (ref 42.7–76)
NRBC BLD AUTO-RTO: 0 /100 WBC (ref 0–0.2)
PLATELET # BLD AUTO: 179 10*3/MM3 (ref 140–450)
PMV BLD AUTO: 11 FL (ref 6–12)
POTASSIUM SERPL-SCNC: 4.3 MMOL/L (ref 3.5–5.2)
PROT SERPL-MCNC: 6.3 G/DL (ref 6–8.5)
QT INTERVAL: 381 MS
QT INTERVAL: 395 MS
RBC # BLD AUTO: 4.21 10*6/MM3 (ref 4.14–5.8)
SODIUM SERPL-SCNC: 137 MMOL/L (ref 136–145)
TROPONIN T DELTA: -7 NG/L
TROPONIN T SERPL HS-MCNC: 38 NG/L
TROPONIN T SERPL HS-MCNC: 39 NG/L
WBC NRBC COR # BLD: 7.59 10*3/MM3 (ref 3.4–10.8)
WHOLE BLOOD HOLD COAG: NORMAL
WHOLE BLOOD HOLD COAG: NORMAL
WHOLE BLOOD HOLD SPECIMEN: NORMAL
WHOLE BLOOD HOLD SPECIMEN: NORMAL

## 2023-05-31 PROCEDURE — 71045 X-RAY EXAM CHEST 1 VIEW: CPT

## 2023-05-31 PROCEDURE — G0378 HOSPITAL OBSERVATION PER HR: HCPCS

## 2023-05-31 PROCEDURE — 83735 ASSAY OF MAGNESIUM: CPT

## 2023-05-31 PROCEDURE — 85025 COMPLETE CBC W/AUTO DIFF WBC: CPT

## 2023-05-31 PROCEDURE — 83690 ASSAY OF LIPASE: CPT | Performed by: NURSE PRACTITIONER

## 2023-05-31 PROCEDURE — 99285 EMERGENCY DEPT VISIT HI MDM: CPT

## 2023-05-31 PROCEDURE — 84484 ASSAY OF TROPONIN QUANT: CPT | Performed by: NURSE PRACTITIONER

## 2023-05-31 PROCEDURE — 36415 COLL VENOUS BLD VENIPUNCTURE: CPT | Performed by: NURSE PRACTITIONER

## 2023-05-31 PROCEDURE — 93005 ELECTROCARDIOGRAM TRACING: CPT

## 2023-05-31 PROCEDURE — 93005 ELECTROCARDIOGRAM TRACING: CPT | Performed by: EMERGENCY MEDICINE

## 2023-05-31 PROCEDURE — 96360 HYDRATION IV INFUSION INIT: CPT

## 2023-05-31 PROCEDURE — 96372 THER/PROPH/DIAG INJ SC/IM: CPT

## 2023-05-31 PROCEDURE — 82948 REAGENT STRIP/BLOOD GLUCOSE: CPT

## 2023-05-31 PROCEDURE — 25010000002 ENOXAPARIN PER 10 MG: Performed by: INTERNAL MEDICINE

## 2023-05-31 PROCEDURE — 80053 COMPREHEN METABOLIC PANEL: CPT

## 2023-05-31 PROCEDURE — 84484 ASSAY OF TROPONIN QUANT: CPT

## 2023-05-31 RX ORDER — ENOXAPARIN SODIUM 100 MG/ML
40 INJECTION SUBCUTANEOUS NIGHTLY
Status: DISCONTINUED | OUTPATIENT
Start: 2023-05-31 | End: 2023-06-01 | Stop reason: HOSPADM

## 2023-05-31 RX ORDER — ONDANSETRON 2 MG/ML
4 INJECTION INTRAMUSCULAR; INTRAVENOUS EVERY 6 HOURS PRN
Status: DISCONTINUED | OUTPATIENT
Start: 2023-05-31 | End: 2023-06-01 | Stop reason: HOSPADM

## 2023-05-31 RX ORDER — AMOXICILLIN 250 MG
2 CAPSULE ORAL 2 TIMES DAILY
Status: DISCONTINUED | OUTPATIENT
Start: 2023-05-31 | End: 2023-06-01 | Stop reason: HOSPADM

## 2023-05-31 RX ORDER — ONDANSETRON 4 MG/1
4 TABLET, FILM COATED ORAL EVERY 6 HOURS PRN
Status: DISCONTINUED | OUTPATIENT
Start: 2023-05-31 | End: 2023-06-01 | Stop reason: HOSPADM

## 2023-05-31 RX ORDER — BISACODYL 5 MG/1
5 TABLET, DELAYED RELEASE ORAL DAILY PRN
Status: DISCONTINUED | OUTPATIENT
Start: 2023-05-31 | End: 2023-06-01 | Stop reason: HOSPADM

## 2023-05-31 RX ORDER — ASPIRIN 81 MG/1
324 TABLET, CHEWABLE ORAL ONCE
Status: COMPLETED | OUTPATIENT
Start: 2023-05-31 | End: 2023-05-31

## 2023-05-31 RX ORDER — LISINOPRIL AND HYDROCHLOROTHIAZIDE 12.5; 1 MG/1; MG/1
1 TABLET ORAL DAILY
COMMUNITY
End: 2023-06-01 | Stop reason: HOSPADM

## 2023-05-31 RX ORDER — PANTOPRAZOLE SODIUM 40 MG/1
40 TABLET, DELAYED RELEASE ORAL
Status: DISCONTINUED | OUTPATIENT
Start: 2023-06-01 | End: 2023-06-01 | Stop reason: HOSPADM

## 2023-05-31 RX ORDER — UREA 10 %
3 LOTION (ML) TOPICAL NIGHTLY PRN
Status: DISCONTINUED | OUTPATIENT
Start: 2023-05-31 | End: 2023-06-01 | Stop reason: HOSPADM

## 2023-05-31 RX ORDER — SODIUM CHLORIDE 0.9 % (FLUSH) 0.9 %
10 SYRINGE (ML) INJECTION AS NEEDED
Status: DISCONTINUED | OUTPATIENT
Start: 2023-05-31 | End: 2023-06-01 | Stop reason: HOSPADM

## 2023-05-31 RX ORDER — POLYETHYLENE GLYCOL 3350 17 G/17G
17 POWDER, FOR SOLUTION ORAL DAILY PRN
Status: DISCONTINUED | OUTPATIENT
Start: 2023-05-31 | End: 2023-06-01 | Stop reason: HOSPADM

## 2023-05-31 RX ORDER — BISACODYL 10 MG
10 SUPPOSITORY, RECTAL RECTAL DAILY PRN
Status: DISCONTINUED | OUTPATIENT
Start: 2023-05-31 | End: 2023-06-01 | Stop reason: HOSPADM

## 2023-05-31 RX ORDER — BUDESONIDE, GLYCOPYRROLATE, AND FORMOTEROL FUMARATE 160; 9; 4.8 UG/1; UG/1; UG/1
2 AEROSOL, METERED RESPIRATORY (INHALATION) 2 TIMES DAILY
COMMUNITY

## 2023-05-31 RX ORDER — ACETAMINOPHEN 325 MG/1
650 TABLET ORAL EVERY 4 HOURS PRN
Status: DISCONTINUED | OUTPATIENT
Start: 2023-05-31 | End: 2023-06-01 | Stop reason: HOSPADM

## 2023-05-31 RX ADMIN — ASPIRIN 324 MG: 81 TABLET, CHEWABLE ORAL at 16:44

## 2023-05-31 RX ADMIN — SODIUM CHLORIDE 1000 ML: 9 INJECTION, SOLUTION INTRAVENOUS at 17:54

## 2023-05-31 RX ADMIN — DOCUSATE SODIUM 50MG AND SENNOSIDES 8.6MG 2 TABLET: 8.6; 5 TABLET, FILM COATED ORAL at 21:54

## 2023-05-31 RX ADMIN — ENOXAPARIN SODIUM 40 MG: 100 INJECTION SUBCUTANEOUS at 21:54

## 2023-05-31 NOTE — ED PROVIDER NOTES
MD ATTESTATION NOTE    The JAN and I have discussed this patient's history, physical exam, and treatment plan.  I have reviewed the documentation and personally had a face to face interaction with the patient. I affirm the documentation and agree with the treatment and plan.  The attached note describes my personal findings.      I provided a substantive portion of the care of the patient.  I personally performed the physical exam in its entirety, and below are my findings.  For this patient encounter, the patient wore surgical mask, I wore full protective PPE including N95 and eye protection.      Brief HPI: Patient complains of intermittent chest discomfort for the past few days.  He was started on new blood pressure medication on 5/18.  Denies shortness of breath, nausea, vomiting, or sweating.  Patient is a poor historian so history is limited.    Previous records reviewed.  Patient had a cardiac catheterization done in February 2018 which showed normal epicardial coronary blood vessels and normal LV function.    PHYSICAL EXAM  ED Triage Vitals   Temp Heart Rate Resp BP SpO2   05/31/23 1556 05/31/23 1556 05/31/23 1601 05/31/23 1601 05/31/23 1556   97.5 °F (36.4 °C) 75 18 99/64 96 %      Temp src Heart Rate Source Patient Position BP Location FiO2 (%)   -- -- 05/31/23 1748 -- --     Lying           GENERAL: Awake, alert, oriented x3.  Well-developed, well-nourished elderly male.  Resting comfortably in no acute distress  HENT: nares patent  EYES: no scleral icterus  CV: regular rhythm, normal rate  RESPIRATORY: normal effort, clear to auscultation bilaterally  ABDOMEN: soft, nontender  MUSCULOSKELETAL: no deformity  NEURO: alert, moves all extremities, follows commands  PSYCH:  calm, cooperative  SKIN: warm, dry    Vital signs and nursing notes reviewed.        Plan: Obtain labs, EKG, and chest x-ray    Initial troponin was 39.  Repeat had a delta of -1.  Serial EKGs were stable.  Chest x-ray is negative acute.   Patient will be admitted to the hospitalist.    ED Course as of 05/31/23 2201   Wed May 31, 2023   1628 States having cp for the past 3 days, feels like indigestion. Saw his pcp dr. Starks today and was sent to the ER for further testing. He describes it as pressure like, accompanied by nausea and diaphoresis and short of breath. States it is occurring both with exertion and at rest. States he has had low energy for the past few days. C/o productive clear cough. States his pcp started himon a new BP med last month. Does not have a cardiologist. States he has had a stfress test before but none recently [AH]   1702 EKG personally interpreted by me.  My personal interpretation is:          EKG time: 1603  Rhythm/Rate: Sinus rhythm, rate 71  P waves and VA: First-degree AV block  QRS, axis: LAD, anterior Q waves  ST and T waves: Minimal ST elevation in the lateral leads, no ST depression    Interpreted Contemporaneously by me, independently viewed  No prior available for comparison    [WH]   1703 REPEAT EKG          EKG time: 1625  Rhythm/Rate: Sinus rhythm, rate 65  P waves and VA: First-degree AV block  QRS, axis: LAD, anterior Q waves  ST and T waves: Minimal ST elevation in the lateral leads, no ST depression    Interpreted Contemporaneously by me, independently viewed  EKG is not significantly changed compared to prior EKG done today at 1603   [WH]   1711 Phone call with dr. Malagon on call for [facility] cardiology.  Discussed the patient, relevant history, exam, diagnostics, ED findings/progress, and concerns. They agree to consult.    [AH]   1715 Chest x-ray personally interpreted by me.  My personal interpretation is: Heart size is normal.  Lungs are clear.  No effusion. [WH]   1740 Phone call with dr. gregory.  Discussed the patient, relevant history, exam, diagnostics, ED findings/progress, and concerns. They agree to admit to an obs tele bed   []   1948 EKG 5/31/2020 2316: 25 per my admin interpretation is sinus  rhythm rate of 65, QTc 411, LAFB, no evidence of acute ischemia [AH]      ED Course User Index  [] July Can APRN  [] Imtiaz Nails MD Holland, William D, MD  05/31/23 9010       Imtiaz Nails MD  05/31/23 2255

## 2023-05-31 NOTE — PROGRESS NOTES
"[facility] Services: Enoxaparin Consult    Devin Scales has a pharmacy consult to dose prophylactic enoxaparin per Dr. Wade's request.     Indication: VTE Prophylaxis  Home Anticoagulation: None     Relevant clinical data and objective history reviewed:  82 y.o. male 172.7 cm (68\") 59.9 kg (132 lb)   Body mass index is 20.07 kg/m². Results from last 7 days   Lab Units 05/31/23  1605   PLATELETS 10*3/mm3 179     Estimated Creatinine Clearance: 38.1 mL/min (A) (by C-G formula based on SCr of 1.31 mg/dL (H)).    Assessment/Plan    Will start patient on lovenox 40mg subcutaneous every 24 hours for VTE prophylaxis    Promise Serna, PharmD  Emergency Medicine Clinical Pharmacist   Phone: [phone]      "

## 2023-05-31 NOTE — PROGRESS NOTES
Clinical Pharmacy Services: Medication History    Devin Scales is a 82 y.o. male presenting to [facility] for   Chief Complaint   Patient presents with   • Weakness - Generalized       He  has a past medical history of Benign essential tremor, BPH (benign prostatic hyperplasia), Chest pain, Dyspnea, Fatigue, and Hyperlipidemia.    Allergies as of 05/31/2023   • (No Known Allergies)       Medication information was obtained from: Patient & Medication List   Pharmacy and Phone Number:     Prior to Admission Medications     Prescriptions Last Dose Informant Patient Reported? Taking?    atorvastatin (LIPITOR) 80 MG tablet  Pharmacy Yes Yes    Take 1 tablet by mouth Daily.    Budeson-Glycopyrrol-Formoterol (Breztri Aerosphere) 160-9-4.8 MCG/ACT aerosol inhaler  Self Yes Yes    Inhale 2 puffs 2 (Two) Times a Day.    lisinopril-hydrochlorothiazide (PRINZIDE,ZESTORETIC) 10-12.5 MG per tablet  Pharmacy, Self Yes Yes    Take 1 tablet by mouth Daily.    propranolol XL (INNOPRAN XL) 80 MG 24 hr capsule  Pharmacy, Self Yes Yes    1 capsule Daily.    tamsulosin (FLOMAX) 0.4 MG capsule 24 hr capsule  Self, Pharmacy Yes Yes    Take 1 capsule by mouth Daily.            Medication notes:     This medication list is complete to the best of my knowledge as of 5/31/2023    Please call if questions.    Yakov Mills  Medication History Technician   [phone]    5/31/2023 17:06 EDT

## 2023-05-31 NOTE — ED NOTES
"Nursing report ED to floor  Devin Scales  82 y.o.  male    HPI :   Chief Complaint   Patient presents with    Weakness - Generalized       Admitting doctor:   Halina Wade MD    Admitting diagnosis:   The primary encounter diagnosis was Acute kidney injury. A diagnosis of Elevated troponin was also pertinent to this visit.    Code status:   Current Code Status       Date Active Code Status Order ID Comments User Context       Not on file            Allergies:   Patient has no known allergies.    Isolation:   No active isolations    Intake and Output  No intake or output data in the 24 hours ending 05/31/23 1749    Weight:       05/31/23  1601   Weight: 59.9 kg (132 lb)       Most recent vitals:   Vitals:    05/31/23 1556 05/31/23 1601 05/31/23 1631   BP:  99/64 113/57   Pulse: 75     Resp:  18    Temp: 97.5 °F (36.4 °C)     SpO2: 96%     Weight:  59.9 kg (132 lb)    Height:  172.7 cm (68\")        Active LDAs/IV Access:   Lines, Drains & Airways       Active LDAs       Name Placement date Placement time Site Days    Peripheral IV 05/31/23 1702 Right Antecubital 05/31/23  1702  Antecubital  less than 1                    Labs (abnormal labs have a star):   Labs Reviewed   COMPREHENSIVE METABOLIC PANEL - Abnormal; Notable for the following components:       Result Value    Glucose 114 (*)     Creatinine 1.31 (*)     Total Bilirubin 1.5 (*)     eGFR 55.0 (*)     All other components within normal limits    Narrative:     GFR Normal >60  Chronic Kidney Disease <60  Kidney Failure <15    The GFR formula is only valid for adults with stable renal function between ages 18 and 70.   SINGLE HSTROPONIN T - Abnormal; Notable for the following components:    HS Troponin T 39 (*)     All other components within normal limits    Narrative:     High Sensitive Troponin T Reference Range:  <10.0 ng/L- Negative Female for AMI  <15.0 ng/L- Negative Male for AMI  >=10 - Abnormal Female indicating possible myocardial " injury.  >=15 - Abnormal Male indicating possible myocardial injury.   Clinicians would have to utilize clinical acumen, EKG, Troponin, and serial changes to determine if it is an Acute Myocardial Infarction or myocardial injury due to an underlying chronic condition.        TROPONIN - Abnormal; Notable for the following components:    HS Troponin T 38 (*)     All other components within normal limits    Narrative:     High Sensitive Troponin T Reference Range:  <10.0 ng/L- Negative Female for AMI  <15.0 ng/L- Negative Male for AMI  >=10 - Abnormal Female indicating possible myocardial injury.  >=15 - Abnormal Male indicating possible myocardial injury.   Clinicians would have to utilize clinical acumen, EKG, Troponin, and serial changes to determine if it is an Acute Myocardial Infarction or myocardial injury due to an underlying chronic condition.        LIPASE - Abnormal; Notable for the following components:    Lipase 141 (*)     All other components within normal limits   MAGNESIUM - Normal   CBC WITH AUTO DIFFERENTIAL - Normal   RAINBOW DRAW    Narrative:     The following orders were created for panel order [place] Draw.  Procedure                               Abnormality         Status                     ---------                               -----------         ------                     Green Top (Gel)[[id number]]                                  Final result               Lavender Top[[id number]]                                     Final result               Gold Top - SST[[id number]]                                   Final result               Light Blue Top[[id number]]                                   Final result                 Please view results for these tests on the individual orders.   URINALYSIS W/ CULTURE IF INDICATED   RAINBOW DRAW    Narrative:     The following orders were created for panel order [place] Draw.  Procedure                               Abnormality         Status                      ---------                               -----------         ------                     Green Top (Gel)[[id number]]                                  Final result               Lavender Top[[id number]]                                                                Gold Top - SST[[id number]]                                                              Light Blue Top[[id number]]                                                                Please view results for these tests on the individual orders.   HIGH SENSITIVITIY TROPONIN T 2HR   POCT GLUCOSE FINGERSTICK   CBC AND DIFFERENTIAL    Narrative:     The following orders were created for panel order CBC & Differential.  Procedure                               Abnormality         Status                     ---------                               -----------         ------                     CBC Auto Differential[[id number]]        Normal              Final result                 Please view results for these tests on the individual orders.   GREEN TOP   LAVENDER TOP   GOLD TOP - SST   LIGHT BLUE TOP   GREEN TOP   LAVENDER TOP   GOLD TOP - SST   LIGHT BLUE TOP       EKG:   ECG 12 Lead Chest Pain   Preliminary Result   HEART RATE= 65  bpm   RR Interval= 923  ms   WA Interval= 257  ms   P Horizontal Axis=   deg   P Front Axis= 0  deg   QRSD Interval= 105  ms   QT Interval= 395  ms   QRS Axis= -61  deg   T Wave Axis= 81  deg   - ABNORMAL ECG -   Sinus rhythm   Prolonged WA interval   Left anterior fascicular block   Anterior infarct, old   Electronically Signed By:    Date and Time of Study: 2023-05-31 16:25:14      ECG 12 Lead ED Triage Standing Order; Weak / Dizzy / AMS   Preliminary Result   HEART RATE= 71  bpm   RR Interval= 845  ms   WA Interval= 271  ms   P Horizontal Axis= 222  deg   P Front Axis= -29  deg   QRSD Interval= 95  ms   QT Interval= 381  ms   QRS Axis= -63  deg   T Wave Axis= 74  deg   - ABNORMAL ECG -   Sinus rhythm   Prolonged WA interval   Left  anterior fascicular block   Anterior infarct, old   Minimal ST elevation, lateral leads   Electronically Signed By:    Date and Time of Study: 2023-05-31 16:03:36      ECG 12 Lead Chest Pain    (Results Pending)       Meds given in ED:   Medications   sodium chloride 0.9 % flush 10 mL (has no administration in time range)   sodium chloride 0.9 % flush 10 mL (has no administration in time range)   sodium chloride 0.9 % bolus 1,000 mL (has no administration in time range)   aspirin chewable tablet 324 mg (324 mg Oral Given 5/31/23 1644)       Imaging results:  XR Chest 1 View    Result Date: 5/31/2023  No focal pulmonary consolidation. Pulmonary hyperinflation. Follow-up as clinical indications persist.  This report was finalized on 5/31/2023 4:34 PM by Dr. Deep Mitchell M.D.       Ambulatory status:   - with assist    Social issues:   Social History     Socioeconomic History    Marital status: Single   Tobacco Use    Smoking status: Every Day     Packs/day: 0.25     Years: 30.00     Pack years: 7.50     Types: Cigarettes    Smokeless tobacco: Never   Vaping Use    Vaping Use: Never used   Substance and Sexual Activity    Alcohol use: Yes     Comment: SOCIALLY    Drug use: No    Sexual activity: Defer       NIH Stroke Scale:         Pavel Benjamin RN  05/31/23 17:49 EDT

## 2023-05-31 NOTE — ED PROVIDER NOTES
EMERGENCY DEPARTMENT ENCOUNTER    Room Number:  26/26  Date seen:  5/31/2023  PCP: Baldev Kothari MD  Historian: Patient      HPI:  Chief Complaint: Chest pain  A complete HPI/ROS/PMH/PSH/SH/FH are unobtainable due to: vague historian  Context: Devin Scales is a pleasant afebrile 82 y.o.  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 INVASIVE LOCATION;  Service:    • CARDIAC CATHETERIZATION N/A 2/13/2018    Procedure: Coronary angiography;  Surgeon: Emelia Arellano MD;  Location: [facility] CATH INVASIVE LOCATION;  Service:    • CARDIAC CATHETERIZATION N/A 2/13/2018    Procedure: Left ventriculography;  Surgeon: Emelia Arellano MD;  Location: [facility] CATH INVASIVE LOCATION;  Service:          FAMILY HISTORY  No family history on file.      SOCIAL HISTORY  Social History     Socioeconomic History   • Marital status: Single   Tobacco Use   • Smoking status: Every Day     Packs/day: 0.25     Years: 30.00     Pack years: 7.50     Types: Cigarettes   • Smokeless tobacco: Never   Vaping Use   • Vaping Use:  Never used   Substance and Sexual Activity   • Alcohol use: Yes     Comment: SOCIALLY   • Drug use: No   • Sexual activity: Defer         ALLERGIES  Patient has no known allergies.        REVIEW OF SYSTEMS  Review of Systems   Cardiovascular: Positive for chest pain.            PHYSICAL EXAM  ED Triage Vitals   Temp Heart Rate Resp BP SpO2   05/31/23 1556 05/31/23 1556 05/31/23 1601 05/31/23 1601 05/31/23 1556   97.5 °F (36.4 °C) 75 18 99/64 96 %      Temp src Heart Rate Source Patient Position BP Location FiO2 (%)   -- -- -- -- --              Physical Exam      GENERAL: Alert and oriented x4, no acute distress, elderly/frail appearing  HENT: nares patent, mucous membranes moist and intact  EYES: no scleral icterus, no injection  CV: regular rhythm, normal rate, no rubs or murmurs, no peripheral edema  RESPIRATORY: normal effort, clear to auscultation all fields bilaterally, able to speak in full sentences without hint of distress  ABDOMEN: soft and nontender diffusely, bowel sounds WNL, no rebound or guarding  MUSCULOSKELETAL: no deformity, normal active range of motion without deformity to all 4 extremities  NEURO: alert, moves all extremities, follows commands  PSYCH: Flat affect, cooperative  SKIN: warm, dry and intact      Vital signs and nursing notes reviewed.          LAB RESULTS  Recent Results (from the past 24 hour(s))   ECG 12 Lead ED Triage Standing Order; Weak / Dizzy / AMS    Collection Time: 05/31/23  4:03 PM   Result Value Ref Range    QT Interval 381 ms   Comprehensive Metabolic Panel    Collection Time: 05/31/23  4:05 PM    Specimen: Blood   Result Value Ref Range    Glucose 114 (H) 65 - 99 mg/dL    BUN 21 8 - 23 mg/dL    Creatinine 1.31 (H) 0.76 - 1.27 mg/dL    Sodium 137 136 - 145 mmol/L    Potassium 4.3 3.5 - 5.2 mmol/L    Chloride 104 98 - 107 mmol/L    CO2 24.0 22.0 - 29.0 mmol/L    Calcium 9.0 8.6 - 10.5 mg/dL    Total Protein 6.3 6.0 - 8.5 g/dL    Albumin 4.3 3.5 - 5.2 g/dL    ALT (SGPT)  21 1 - 41 U/L    AST (SGOT) 26 1 - 40 U/L    Alkaline Phosphatase 58 39 - 117 U/L    Total Bilirubin 1.5 (H) 0.0 - 1.2 mg/dL    Globulin 2.0 gm/dL    A/G Ratio 2.2 g/dL    BUN/Creatinine Ratio 16.0 7.0 - 25.0    Anion Gap 9.0 5.0 - 15.0 mmol/L    eGFR 55.0 (L) >60.0 mL/min/1.73   Single High Sensitivity Troponin T    Collection Time: 05/31/23  4:05 PM    Specimen: Blood   Result Value Ref Range    HS Troponin T 39 (H) <15 ng/L   Magnesium    Collection Time: 05/31/23  4:05 PM    Specimen: Blood   Result Value Ref Range    Magnesium 2.0 1.6 - 2.4 mg/dL   Green Top (Gel)    Collection Time: 05/31/23  4:05 PM   Result Value Ref Range    Extra Tube Hold for add-ons.    Lavender Top    Collection Time: 05/31/23  4:05 PM   Result Value Ref Range    Extra Tube hold for add-on    Gold Top - SST    Collection Time: 05/31/23  4:05 PM   Result Value Ref Range    Extra Tube Hold for add-ons.    Light Blue Top    Collection Time: 05/31/23  4:05 PM   Result Value Ref Range    Extra Tube Hold for add-ons.    CBC Auto Differential    Collection Time: 05/31/23  4:05 PM    Specimen: Blood   Result Value Ref Range    WBC 7.59 3.40 - 10.80 10*3/mm3    RBC 4.21 4.14 - 5.80 10*6/mm3    Hemoglobin 13.6 13.0 - 17.7 g/dL    Hematocrit 40.3 37.5 - 51.0 %    MCV 95.7 79.0 - 97.0 fL    MCH 32.3 26.6 - 33.0 pg    MCHC 33.7 31.5 - 35.7 g/dL    RDW 12.6 12.3 - 15.4 %    RDW-SD 44.0 37.0 - 54.0 fl    MPV 11.0 6.0 - 12.0 fL    Platelets 179 140 - 450 10*3/mm3    Neutrophil % 59.2 42.7 - 76.0 %    Lymphocyte % 30.0 19.6 - 45.3 %    Monocyte % 6.9 5.0 - 12.0 %    Eosinophil % 2.8 0.3 - 6.2 %    Basophil % 0.8 0.0 - 1.5 %    Immature Grans % 0.3 0.0 - 0.5 %    Neutrophils, Absolute 4.50 1.70 - 7.00 10*3/mm3    Lymphocytes, Absolute 2.28 0.70 - 3.10 10*3/mm3    Monocytes, Absolute 0.52 0.10 - 0.90 10*3/mm3    Eosinophils, Absolute 0.21 0.00 - 0.40 10*3/mm3    Basophils, Absolute 0.06 0.00 - 0.20 10*3/mm3    Immature Grans, Absolute 0.02 0.00 - 0.05  10*3/mm3    nRBC 0.0 0.0 - 0.2 /100 WBC   Lipase    Collection Time: 05/31/23  4:05 PM    Specimen: Blood   Result Value Ref Range    Lipase 141 (H) 13 - 60 U/L   ECG 12 Lead Chest Pain    Collection Time: 05/31/23  4:25 PM   Result Value Ref Range    QT Interval 395 ms   High Sensitivity Troponin T    Collection Time: 05/31/23  4:39 PM    Specimen: Blood   Result Value Ref Range    HS Troponin T 38 (H) <15 ng/L   Green Top (Gel)    Collection Time: 05/31/23  4:39 PM   Result Value Ref Range    Extra Tube Hold for add-ons.    POC Glucose Once    Collection Time: 05/31/23  5:50 PM    Specimen: Blood   Result Value Ref Range    Glucose 83 70 - 130 mg/dL       Ordered the above labs and reviewed the results.        RADIOLOGY  XR Chest 1 View    Result Date: 5/31/2023  XR CHEST 1 VW-  HISTORY: Male who is 82 years-old,  weakness  TECHNIQUE: Frontal view of the chest  COMPARISON: 12/25/2022  FINDINGS: The heart size is normal. Aorta is calcified. Pulmonary vasculature is unremarkable. No focal pulmonary consolidation, pleural effusion, or pneumothorax. Pulmonary hyperinflation suggests COPD, correlate clinically. No acute osseous process.      No focal pulmonary consolidation. Pulmonary hyperinflation. Follow-up as clinical indications persist.  This report was finalized on 5/31/2023 4:34 PM by Dr. Deep Mitchell M.D.        Ordered the above noted radiological studies. Reviewed by me in PACS.            PROCEDURES  Procedures            MEDICATIONS GIVEN IN ER  Medications   sodium chloride 0.9 % flush 10 mL (has no administration in time range)   sodium chloride 0.9 % flush 10 mL (has no administration in time range)   aspirin chewable tablet 324 mg (has no administration in time range)                   MEDICAL DECISION MAKING, PROGRESS, and CONSULTS    All labs have been independently reviewed by me.  All radiology studies have been reviewed by me and I have also reviewed the radiology report.   EKG's  independently viewed and interpreted by me.  Discussion below represents my analysis of pertinent findings related to patient's condition, differential diagnosis, treatment plan and final disposition.      Additional sources:  - Discussed/ obtained information from independent historians: History obtained from patient and EMS    - External (non-ED) record review: Office visit 4/19/2023 family medicine Dr. Starks.  Patient was hospitalized in December and Plavix was held during this hospitalization, somehow patient was discontinued off of Lipitor as well.    Office visit/20/2023 seen by Dr. Joiner with neurosurgery service moderate suspicion for CSF leak plan to collect nasal drainage and if this is positive plan for lumbar drain if negative attributes symptoms to rhinitis and headaches to postconcussive syndrome; patient's lab work-up/20 4/2023 was negative for beta-2 transferrin which is diagnostic for cerebrospinal fluid    - Chronic or social conditions impacting care: Patient lacks insight regarding his health, he brought a folder of prescription medication paperwork when asked if he had a medication list he provides this folder but does not know anything about his medications    - Shared decision making: I feel that this patient is fairly high risk given his poor medical insight which certainly could be from prior traumatic brain injury however given that he is here with chest pain and has known history of coronary artery disease patient is agreeable to admission to the hospital which I think is safest for him as he can certainly be lost to follow-up      Orders placed during this visit:  Orders Placed This Encounter   Procedures   • XR Chest 1 View   • Bryant Draw   • Comprehensive Metabolic Panel   • Single High Sensitivity Troponin T   • Magnesium   • Urinalysis With Culture If Indicated -   • CBC Auto Differential   • Bryant Draw   • High Sensitivity Troponin T   • Lipase   • NPO Diet NPO Type: Strict NPO    • Undress & Gown   • Vital Signs   • Orthostatic Blood Pressure   • Continuous Pulse Oximetry   • Vital Signs   • Orthostatic Blood Pressure   • Oxygen Therapy- Nasal Cannula; Titrate 1-6 LPM Per SpO2; 90 - 95%   • Oxygen Therapy- Nasal Cannula; Titrate 1-6 LPM Per SpO2; 90 - 95%   • POC Glucose Once   • ECG 12 Lead ED Triage Standing Order; Weak / Dizzy / AMS   • ECG 12 Lead Chest Pain   • ECG 12 Lead Chest Pain   • ECG 12 Lead Chest Pain   • Insert Peripheral IV   • Insert Peripheral IV   • Fall Precautions   • CBC & Differential   • Green Top (Gel)   • Lavender Top   • Gold Top - SST   • Light Blue Top   • Green Top (Gel)   • Lavender Top   • Gold Top - SST   • Light Blue Top         Additional orders considered but not ordered:  I considered checking a BNP on this patient but given no lower extremity edema no adventitious/wet sounding lungs I do not think that this would add much to the clinical picture        Differential diagnosis includes but is not limited to:    ACS, pancreatitis, pneumonia      Independent interpretation of labs, radiology studies, and discussions with consultants:  ED Course as of 05/31/23 1948   Wed May 31, 2023   1628 States having cp for the past 3 days, feels like indigestion. Saw his pcp dr. Starks today and was sent to the ER for further testing. He describes it as pressure like, accompanied by nausea and diaphoresis and short of breath. States it is occurring both with exertion and at rest. States he has had low energy for the past few days. C/o productive clear cough. States his pcp started himon a new BP med last month. Does not have a cardiologist. States he has had a stfress test before but none recently [AH]   1702 EKG personally interpreted by me.  My personal interpretation is:          EKG time: 1603  Rhythm/Rate: Sinus rhythm, rate 71  P waves and NC: First-degree AV block  QRS, axis: LAD, anterior Q waves  ST and T waves: Minimal ST elevation in the lateral leads, no ST  depression    Interpreted Contemporaneously by me, independently viewed  No prior available for comparison    []   1703 REPEAT EKG          EKG time: 1625  Rhythm/Rate: Sinus rhythm, rate 65  P waves and AZ: First-degree AV block  QRS, axis: LAD, anterior Q waves  ST and T waves: Minimal ST elevation in the lateral leads, no ST depression    Interpreted Contemporaneously by me, independently viewed  EKG is not significantly changed compared to prior EKG done today at 1603   []   1711 Phone call with dr. Malagon on call for [facility] cardiology.  Discussed the patient, relevant history, exam, diagnostics, ED findings/progress, and concerns. They agree to consult.    []   1715 Chest x-ray personally interpreted by me.  My personal interpretation is: Heart size is normal.  Lungs are clear.  No effusion. []   1740 Phone call with dr. gregory.  Discussed the patient, relevant history, exam, diagnostics, ED findings/progress, and concerns. They agree to admit to an obs tele bed   []   1948 EKG 5/31/2020 2316: 25 per my admin interpretation is sinus rhythm rate of 65, QTc 411, LAFB, no evidence of acute ischemia []      ED Course User Index  [] July Can APRN  [] Imtiaz Nails MD     Cardiac cath 2/18/2018  Findings:  1.  Left heart catheterization: LVEDP 7 mmHg       2.  Left ventriculography:  LV function normal.  LVEF 65%.      3.  Selective native coronary angiography:  A. Left main bifurcates into LAD and left circumflex.  No angiographic disease noted.   B. LAD: Large vessel, wraps around the apex.  Gives rise to one prominent diagonal branch.  No angiographic disease noted in the LAD an its branches  C. Left circumflex: Gives rise to small caliber high OM1 followed by large caliber bifurcating OM 2.  No angiographic disease noted in circumflex and its branches  D. Right coronary artery: Dominant vessel.  No angiographic disease noted.        Conclusion:  Normal epicardial coronary blood  vessels.  Normal LVEDP.  Preserved LV function.  False-positive stress test        I have worn appropriate PPE during this patient encounter, sanitized my hands both with entering and exiting patient's room.      DIAGNOSIS  Final diagnoses:   Acute kidney injury   Elevated troponin         DISPOSITION  Admitted to medicine            Latest Documented Vital Signs:  As of 16:32 EDT  BP- 121/67 HR- 75 Temp- 97.5 °F (36.4 °C) O2 sat- 96%              --    Please note that portions of this were completed with a voice recognition program.       Note Disclaimer: At [facility], we believe that sharing information builds trust and better relationships. You are receiving this note because you are receiving care at [facility] or recently visited. It is possible you will see health information before a provider has talked with you about it. This kind of information can be easy to misunderstand. To help you fully understand what it means for your health, we urge you to discuss this note with your provider.           July Can APRN  05/31/23 2000

## 2023-06-01 ENCOUNTER — APPOINTMENT (OUTPATIENT)
Dept: NUCLEAR MEDICINE | Facility: HOSPITAL | Age: 83
End: 2023-06-01
Payer: MEDICARE

## 2023-06-01 VITALS
OXYGEN SATURATION: 99 % | WEIGHT: 135.6 LBS | DIASTOLIC BLOOD PRESSURE: 53 MMHG | HEIGHT: 68 IN | TEMPERATURE: 98.6 F | RESPIRATION RATE: 16 BRPM | BODY MASS INDEX: 20.55 KG/M2 | SYSTOLIC BLOOD PRESSURE: 127 MMHG | HEART RATE: 62 BPM

## 2023-06-01 LAB
ANION GAP SERPL CALCULATED.3IONS-SCNC: 9.1 MMOL/L (ref 5–15)
BH CV REST NUCLEAR ISOTOPE DOSE: 11.4 MCI
BH CV STRESS COMMENTS STAGE 1: NORMAL
BH CV STRESS DOSE REGADENOSON STAGE 1: 0.4
BH CV STRESS DURATION MIN STAGE 1: 0
BH CV STRESS DURATION SEC STAGE 1: 10
BH CV STRESS NUCLEAR ISOTOPE DOSE: 34 MCI
BH CV STRESS PROTOCOL 1: NORMAL
BH CV STRESS RECOVERY BP: NORMAL MMHG
BH CV STRESS RECOVERY HR: 94 BPM
BH CV STRESS STAGE 1: 1
BUN SERPL-MCNC: 17 MG/DL (ref 8–23)
BUN/CREAT SERPL: 18.9 (ref 7–25)
CALCIUM SPEC-SCNC: 9 MG/DL (ref 8.6–10.5)
CHLORIDE SERPL-SCNC: 105 MMOL/L (ref 98–107)
CO2 SERPL-SCNC: 22.9 MMOL/L (ref 22–29)
CREAT SERPL-MCNC: 0.9 MG/DL (ref 0.76–1.27)
DEPRECATED RDW RBC AUTO: 43.7 FL (ref 37–54)
EGFRCR SERPLBLD CKD-EPI 2021: 86.3 ML/MIN/1.73
ERYTHROCYTE [DISTWIDTH] IN BLOOD BY AUTOMATED COUNT: 12.5 % (ref 12.3–15.4)
GLUCOSE SERPL-MCNC: 80 MG/DL (ref 65–99)
HCT VFR BLD AUTO: 36.4 % (ref 37.5–51)
HGB BLD-MCNC: 12.6 G/DL (ref 13–17.7)
LV EF NUC BP: 87 %
MAXIMAL PREDICTED HEART RATE: 140 BPM
MCH RBC QN AUTO: 33.2 PG (ref 26.6–33)
MCHC RBC AUTO-ENTMCNC: 34.6 G/DL (ref 31.5–35.7)
MCV RBC AUTO: 96 FL (ref 79–97)
PERCENT MAX PREDICTED HR: 98.57 %
PLATELET # BLD AUTO: 146 10*3/MM3 (ref 140–450)
PMV BLD AUTO: 11 FL (ref 6–12)
POTASSIUM SERPL-SCNC: 3.9 MMOL/L (ref 3.5–5.2)
RBC # BLD AUTO: 3.79 10*6/MM3 (ref 4.14–5.8)
SODIUM SERPL-SCNC: 137 MMOL/L (ref 136–145)
STRESS BASELINE BP: NORMAL MMHG
STRESS BASELINE HR: 60 BPM
STRESS PERCENT HR: 116 %
STRESS POST PEAK BP: NORMAL MMHG
STRESS POST PEAK HR: 138 BPM
STRESS TARGET HR: 119 BPM
WBC NRBC COR # BLD: 6.64 10*3/MM3 (ref 3.4–10.8)

## 2023-06-01 PROCEDURE — 25010000002 REGADENOSON 0.4 MG/5ML SOLUTION: Performed by: HOSPITALIST

## 2023-06-01 PROCEDURE — 78452 HT MUSCLE IMAGE SPECT MULT: CPT

## 2023-06-01 PROCEDURE — G0378 HOSPITAL OBSERVATION PER HR: HCPCS

## 2023-06-01 PROCEDURE — 80048 BASIC METABOLIC PNL TOTAL CA: CPT | Performed by: INTERNAL MEDICINE

## 2023-06-01 PROCEDURE — A9500 TC99M SESTAMIBI: HCPCS | Performed by: HOSPITALIST

## 2023-06-01 PROCEDURE — 0 TECHNETIUM SESTAMIBI: Performed by: HOSPITALIST

## 2023-06-01 PROCEDURE — 93017 CV STRESS TEST TRACING ONLY: CPT

## 2023-06-01 PROCEDURE — 85027 COMPLETE CBC AUTOMATED: CPT | Performed by: INTERNAL MEDICINE

## 2023-06-01 RX ORDER — PANTOPRAZOLE SODIUM 40 MG/1
40 TABLET, DELAYED RELEASE ORAL
Qty: 60 TABLET | Refills: 0 | Status: SHIPPED | OUTPATIENT
Start: 2023-06-01

## 2023-06-01 RX ORDER — ASPIRIN 81 MG/1
81 TABLET ORAL DAILY
Qty: 30 TABLET | Refills: 0 | Status: SHIPPED | OUTPATIENT
Start: 2023-06-01

## 2023-06-01 RX ORDER — REGADENOSON 0.08 MG/ML
0.4 INJECTION, SOLUTION INTRAVENOUS
Status: COMPLETED | OUTPATIENT
Start: 2023-06-01 | End: 2023-06-01

## 2023-06-01 RX ORDER — LISINOPRIL 10 MG/1
10 TABLET ORAL DAILY
Qty: 30 TABLET | Refills: 0 | Status: SHIPPED | OUTPATIENT
Start: 2023-06-01

## 2023-06-01 RX ADMIN — TECHNETIUM TC 99M SESTAMIBI 1 DOSE: 1 INJECTION INTRAVENOUS at 08:02

## 2023-06-01 RX ADMIN — Medication: at 05:16

## 2023-06-01 RX ADMIN — PANTOPRAZOLE SODIUM 40 MG: 40 TABLET, DELAYED RELEASE ORAL at 10:16

## 2023-06-01 RX ADMIN — TECHNETIUM TC 99M SESTAMIBI 1 DOSE: 1 INJECTION INTRAVENOUS at 06:17

## 2023-06-01 RX ADMIN — REGADENOSON 0.4 MG: 0.08 INJECTION, SOLUTION INTRAVENOUS at 08:02

## 2023-06-01 NOTE — DISCHARGE SUMMARY
PHYSICIAN DISCHARGE SUMMARY                                                                        [facility]    Patient Identification:  Name: Devin Scales  Age: 82 y.o.  Sex: male  :  1940  MRN: [id number]  Primary Care Physician: Meron Starks DO    Admit date: 2023  Discharge date and time: 2023     Discharged Condition: good    Discharge Diagnoses:  Chest pain: Appears to be noncardiogenic.  Trial of PPI.  Follow-up with PCP.  STEVENSON: Corrected with hydration.  HCTZ discontinued.  COPD: Urged smoking cessation.  Continue tobacco use: See above.    Hospital Course:  Pleasant 82-year-old gentleman presented emergency with complaints of chest pain.  Please H&P for full details.  There was a mild bump in the high sensitive troponin level.  EKG was nonacute.  He did have significant risk factors however and was admitted for further evaluation.  He did undergo stress Cardiolite study today.  There was no reversible ischemia.  No further chest pain.  He was also started PPIs on presentation.  He will be discharged with prescription for PPI for the next month.  Also note on presentation was an elevated BUN/creatinine.  This associated with a blood pressure on the low side with a systolic in the 90s.  All this is easily corrected overnight with hydration.  On discharge I will keep his HCTZ on hold.  He states he is taking daily aspirin but I will see him in his medication list also given a prescription for enteric-coated aspirin    Consults:     Consults     Date and Time Order Name Status Description    2023  5:24 PM [facility] (on-call MD unless specified) Details      2023  4:51 PM Cardiology (on-call MD unless specified)              Discharge Exam:  Afebrile vital signs stable.  Well-developed well-nourished male in no acute distress.  Lungs clear to auscultation with occasional rhonchi but overall very good air  movement.  Heart regular rate and rhythm.  Extremities no clubbing cyanosis or edema.  Alert oriented converse cooperative     Disposition:  Home    Patient Instructions:      Discharge Medications      New Medications      Instructions Start Date   aspirin 81 MG EC tablet   81 mg, Oral, Daily      lisinopril 10 MG tablet  Commonly known as: PRINIVIL,ZESTRIL   10 mg, Oral, Daily      pantoprazole 40 MG EC tablet  Commonly known as: PROTONIX   40 mg, Oral, 2 Times Daily Before Meals         Continue These Medications      Instructions Start Date   atorvastatin 80 MG tablet  Commonly known as: LIPITOR   80 mg, Oral, Daily      Breztri Aerosphere 160-9-4.8 MCG/ACT aerosol inhaler  Generic drug: Budeson-Glycopyrrol-Formoterol   2 puffs, Inhalation, 2 Times Daily      propranolol XL 80 MG 24 hr capsule  Commonly known as: INNOPRAN XL   1 capsule Daily.      tamsulosin 0.4 MG capsule 24 hr capsule  Commonly known as: FLOMAX   1 capsule, Oral, Daily         Stop These Medications    lisinopril-hydrochlorothiazide 10-12.5 MG per tablet  Commonly known as: PRINZIDE,ZESTORETIC          Diet Instructions     Diet: Cardiac Diets; Healthy Heart (2-3 Na+); Regular Texture (IDDSI 7); Thin (IDDSI 0)      Discharge Diet: Cardiac Diets    Cardiac Diet: Healthy Heart (2-3 Na+)    Texture: Regular Texture (IDDSI 7)    Fluid Consistency: Thin (IDDSI 0)        No future appointments.  Additional Instructions for the Follow-ups that You Need to Schedule     Discharge Follow-up with PCP   As directed       Currently Documented PCP:    Meron Starks DO    PCP Phone Number:    [phone]     Follow Up Details: 1 week            Follow-up Information     Meron Starks DO .    Specialty: Family Medicine  Why: 1 week  Contact information:  [address]  [place] [address]  [phone]                       Discharge Order (From admission, onward)     Start     Ordered    06/01/23 1417  Discharge patient  Once        Expected  Discharge Date: 06/01/23    Discharge Disposition: Home or Self Care    Physician of Record for Attribution - Please select from Treatment Team: AVINASH CASTRO [[id number]]    Review needed by CMO to determine Physician of Record: No       Question Answer Comment   Physician of Record for Attribution - Please select from Treatment Team AVINASH CASTRO    Review needed by CMO to determine Physician of Record No        06/01/23 1419                  Total time spent discharging patient including evaluation,post hospitalization follow up,  medication and post hospitalization instructions and education total time exceeds 30 minutes.    Signed:  Avinash Castro MD  6/1/2023  14:20 EDT    EMR Dragon/Transcription disclaimer:   Much of this encounter note is an electronic transcription/translation of spoken language to printed text. The electronic translation of spoken language may permit erroneous, or at times, nonsensical words or phrases to be inadvertently transcribed; Although I have reviewed the note for such errors, some may still exist.

## 2023-06-01 NOTE — PLAN OF CARE
Goal Outcome Evaluation:           Progress: improving  Outcome Evaluation: Pt AOx4. On RA. NSR on monitor. D/c orders in place. Awaiting on meds to bed. Pt not in any distress. Nursing will continue to monitor

## 2023-06-01 NOTE — CONSULTS
Devin Scales   82 y.o.  male    LOS: 0 days   Patient Care Team:  Baldev Kothari MD as PCP - General (Family Medicine)  Herminio Humphrey MD as Consulting Physician (Cardiology)      Subjective     Chief Complaint:    History of Present Illness:     82-year-old white male patient was seen in the past by my colleague  came to the ER with moderate to severe aching pain across the chest on and off for a few days.  He describes as a pressure.  It was not associate with shortness of breath but associate with the nausea.  Pain did not radiate to arm or neck.  It was not related to exertion.  He has had no prior history of myocardial infarction or congestive heart failure.  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  Patient had an echocardiogram in 1/11/2022 I do not have the report available.  On 2/13/2018 patient had a heart catheterization by  which showed normal coronary arteries.  LV function was normal.  He smokes a pack of cigarettes per day in about 5 days.  He used to be an .  Currently he lives alone.  He has hypertension.  Troponin is 31.  He has hyperlipidemia.      Past Medical History:   Diagnosis Date   • Benign essential tremor    • BPH (benign prostatic hyperplasia)    • Chest pain    • Dyspnea    • Fatigue    • Hyperlipidemia      Past Surgical History:   Procedure Laterality Date   • CARDIAC CATHETERIZATION N/A 2/13/2018    Procedure: Left Heart Cath;  Surgeon: Emelia Arellano MD;  Location: [facility] INVASIVE LOCATION;  Service:    • CARDIAC CATHETERIZATION N/A 2/13/2018    Procedure: Coronary angiography;  Surgeon: Emelia Arellano MD;  Location: [facility] INVASIVE LOCATION;  Service:    • CARDIAC CATHETERIZATION N/A 2/13/2018    Procedure: Left ventriculography;  Surgeon: Emelia Arellano MD;  Location: [facility] CATH INVASIVE LOCATION;  Service:      Medications Prior to Admission   Medication Sig Dispense Refill Last Dose   •  atorvastatin (LIPITOR) 80 MG tablet Take 1 tablet by mouth Daily.      • Budeson-Glycopyrrol-Formoterol (Breztri Aerosphere) 160-9-4.8 MCG/ACT aerosol inhaler Inhale 2 puffs 2 (Two) Times a Day.      • lisinopril-hydrochlorothiazide (PRINZIDE,ZESTORETIC) 10-12.5 MG per tablet Take 1 tablet by mouth Daily.      • propranolol XL (INNOPRAN XL) 80 MG 24 hr capsule 1 capsule Daily.      • tamsulosin (FLOMAX) 0.4 MG capsule 24 hr capsule Take 1 capsule by mouth Daily.          History reviewed. No pertinent family history.  Social History     Socioeconomic History   • Marital status: Single   Tobacco Use   • Smoking status: Every Day     Packs/day: 0.25     Years: 30.00     Pack years: 7.50     Types: Cigarettes   • Smokeless tobacco: Never   Vaping Use   • Vaping Use: Never used   Substance and Sexual Activity   • Alcohol use: Yes     Comment: SOCIALLY   • Drug use: No   • Sexual activity: Defer     Objective       Review of Systems:   Constitutional: Negative for diaphoresis, fatigue, fever and unexpected weight change.   HENT: Negative.    Eyes: Negative.    Respiratory: Negative for cough, shortness of breath and wheezing.    Cardiovascular: Negative for chest pain, palpitations and leg swelling.   Gastrointestinal: Negative for abdominal pain, blood in stool, constipation, diarrhea, nausea and vomiting.   Endocrine: Negative.    Genitourinary: Negative for difficulty urinating, dysuria and frequency.   Musculoskeletal: Negative.    Skin: Negative.    Allergic/Immunologic: Negative for environmental allergies and food allergies.   Neurological: Negative.    Hematological: Negative.    Psychiatric/Behavioral: Negative.        Current Facility-Administered Medications:   •  acetaminophen (TYLENOL) tablet 650 mg, 650 mg, Oral, Q4H PRN, StingHalina morgan MD  •  sennosides-docusate (PERICOLACE) 8.6-50 MG per tablet 2 tablet, 2 tablet, Oral, BID, 2 tablet at 05/31/23 2154 **AND** polyethylene glycol (MIRALAX) packet 17  g, 17 g, Oral, Daily PRN **AND** bisacodyl (DULCOLAX) EC tablet 5 mg, 5 mg, Oral, Daily PRN **AND** bisacodyl (DULCOLAX) suppository 10 mg, 10 mg, Rectal, Daily PRN, Halina Wade MD  •  Enoxaparin Sodium (LOVENOX) syringe 40 mg, 40 mg, Subcutaneous, Nightly, Halina Wade MD, 40 mg at 05/31/23 2154  •  melatonin tablet 3 mg, 3 mg, Oral, Nightly PRN, Halina Wade MD  •  ondansetron (ZOFRAN) tablet 4 mg, 4 mg, Oral, Q6H PRN **OR** ondansetron (ZOFRAN) injection 4 mg, 4 mg, Intravenous, Q6H PRN, Halina Wade MD  •  pantoprazole (PROTONIX) EC tablet 40 mg, 40 mg, Oral, BID AC, Halina Wade MD  •  Pharmacy to Dose enoxaparin (LOVENOX), , Does not apply, Continuous PRN, Halina Wade MD, Last Rate: 100 mL/hr at 06/01/23 0516, New Bag at 06/01/23 0516  •  sodium chloride 0.9 % flush 10 mL, 10 mL, Intravenous, PRN, Emergency, Triage Protocol, MD  •  sodium chloride 0.9 % flush 10 mL, 10 mL, Intravenous, PRN, Herjaswinder, July, APRN      Physical Exam:   Vital Sign Min/Max for last 24 hours  Temp  Min: 97.5 °F (36.4 °C)  Max: 97.9 °F (36.6 °C)   BP  Min: 99/64  Max: 121/67    Pulse  Min: 59  Max: 75     Wt Readings from Last 3 Encounters:   05/31/23 61.5 kg (135 lb 9.6 oz)   01/16/23 61.2 kg (135 lb)   12/26/22 62.8 kg (138 lb 7.2 oz)       General Appearance:  Awake,  Alert, cooperative, in no acute distress   Head:  Normocephalic, without obvious abnormality, atraumatic   Eyes:          Conjunctivae normal, anicteric, eom intact    Neck: No adenopathy, supple, trachea midline, no thyromegaly, no   carotid bruit, no JVD, no elevated cvp   Lungs:   Clear to auscultation,respirations regular, even and                  unlabored    Heart:  Regular rhythm and normal rate, normal S1 and S2,            No murmur, no gallop, no rub, no click    Chest Wall:  No abnormalities observed   Abdomen:   Normal bowel sounds, no masses, soft nontender, nondistended                     Rectal:   Deferred   Extremities: No edema. Moves all extremities well, no cyanosis, no erythema   Pulses: Pulses palpable and equal bilaterally   Skin: No bleeding, bruising or rash   Neurologic: Speech clear and appropriate, no facial drooping     :       MONITOR:    Results Review:     Sodium Sodium   Date Value Ref Range Status   06/01/2023 137 136 - 145 mmol/L Final   05/31/2023 137 136 - 145 mmol/L Final      Potassium Potassium   Date Value Ref Range Status   06/01/2023 3.9 3.5 - 5.2 mmol/L Final     Comment:     Slight hemolysis detected by analyzer. Results may be affected.   05/31/2023 4.3 3.5 - 5.2 mmol/L Final      Chloride Chloride   Date Value Ref Range Status   06/01/2023 105 98 - 107 mmol/L Final   05/31/2023 104 98 - 107 mmol/L Final      Bicarbonate No results found for: PLASMABICARB   BUN BUN   Date Value Ref Range Status   06/01/2023 17 8 - 23 mg/dL Final   05/31/2023 21 8 - 23 mg/dL Final      Creatinine Creatinine   Date Value Ref Range Status   06/01/2023 0.90 0.76 - 1.27 mg/dL Final   05/31/2023 1.31 (H) 0.76 - 1.27 mg/dL Final      Calcium Calcium   Date Value Ref Range Status   06/01/2023 9.0 8.6 - 10.5 mg/dL Final   05/31/2023 9.0 8.6 - 10.5 mg/dL Final      Magnesium Magnesium   Date Value Ref Range Status   05/31/2023 2.0 1.6 - 2.4 mg/dL Final        Results from last 7 days   Lab Units 06/01/23  0629   WBC 10*3/mm3 6.64   HEMOGLOBIN g/dL 12.6*   HEMATOCRIT % 36.4*   PLATELETS 10*3/mm3 146     Lab Results   Lab Value Date/Time    TROPONINT 31 (H) 05/31/2023 2020    TROPONINT 38 (H) 05/31/2023 1639    TROPONINT 39 (H) 05/31/2023 1605     No results found for: CHOL  No results found for: HDL  No results found for: LDL  No results found for: TRIG  No components found for: CHOLHDL  No results found for: PTT  No components found for: PT/INR  No results found for: HGBA1C   No results found for: TSH     Echo EF Estimated  )No results found for: ECHOEFEST      Assessment/ Plan    Active  Hospital Problems    Diagnosis  POA   • **Acute kidney injury [N17.9]  Yes   Hypertension  Hyperlipidemia  Chest pain  Patient is scheduled for a Cardiolite study today  If the Cardiolite study is normal patient can be discharged today.          Essie Johnson MD  06/01/23  08:10 EDT    Discussed with  Time:   •  Left ventricular ejection fraction is hyperdynamic (Calculated EF > 70%).  •  Myocardial perfusion imaging indicates a small-sized infarct located in the septal wall with no significant ischemia noted.  •  Impressions are consistent with a low risk study.  OK to DC

## 2023-06-01 NOTE — PLAN OF CARE
Goal Outcome Evaluation:  Plan of Care Reviewed With: patient        Progress: no change  Outcome Evaluation: Patient  admitted  this  shift  for  STEVENSON  and  Chest pain. NPO  for  Stress  test  this  am.  IV  fluids  ongoing.  No  complaints of  pain voiced.   SR  with  1^  AVB  on the  monitor.   Nursing  will  continue to monitor

## 2023-06-01 NOTE — H&P
HISTORY AND PHYSICAL   [facility]        Date of Admission: 2023  Patient Identification:  Name: Devin Scales  Age: 82 y.o.  Sex: male  :  1940  MRN: [id number]                     Primary Care Physician: Baldev Kothari MD    Chief Complaint:  82 year old gentleman who presented to the emergency room with chest pain which has been present for the last few days; the pain is in the center of his chest and feels like a pressure; it does not get worse with taking a breath; he denies fever, chills or cough; he has history of copd    History of Present Illness:   As above    Past Medical History:  Past Medical History:   Diagnosis Date   • Benign essential tremor    • BPH (benign prostatic hyperplasia)    • Chest pain    • Dyspnea    • Fatigue    • Hyperlipidemia      Past Surgical History:  Past Surgical History:   Procedure Laterality Date   • CARDIAC CATHETERIZATION N/A 2018    Procedure: Left Heart Cath;  Surgeon: Emelia Arellano MD;  Location: [facility] INVASIVE LOCATION;  Service:    • CARDIAC CATHETERIZATION N/A 2018    Procedure: Coronary angiography;  Surgeon: Emelia Arellano MD;  Location: [facility] CATH INVASIVE LOCATION;  Service:    • CARDIAC CATHETERIZATION N/A 2018    Procedure: Left ventriculography;  Surgeon: Emelia Arellano MD;  Location: [facility] CATH INVASIVE LOCATION;  Service:       Home Meds:  Medications Prior to Admission   Medication Sig Dispense Refill Last Dose   • atorvastatin (LIPITOR) 80 MG tablet Take 1 tablet by mouth Daily.      • Budeson-Glycopyrrol-Formoterol (Breztri Aerosphere) 160-9-4.8 MCG/ACT aerosol inhaler Inhale 2 puffs 2 (Two) Times a Day.      • lisinopril-hydrochlorothiazide (PRINZIDE,ZESTORETIC) 10-12.5 MG per tablet Take 1 tablet by mouth Daily.      • propranolol XL (INNOPRAN XL) 80 MG 24 hr capsule 1 capsule Daily.      • tamsulosin (FLOMAX) 0.4 MG capsule 24 hr capsule Take 1 capsule by mouth Daily.          Allergies:  No  "Known Allergies  Immunizations:  Immunization History   Administered Date(s) Administered   • COVID-19 (PFIZER) Purple Cap Monovalent 2021, 2021     Social History:   Social History     Social History Narrative   • Not on file     Social History     Socioeconomic History   • Marital status: Single   Tobacco Use   • Smoking status: Every Day     Packs/day: 0.25     Years: 30.00     Pack years: 7.50     Types: Cigarettes   • Smokeless tobacco: Never   Vaping Use   • Vaping Use: Never used   Substance and Sexual Activity   • Alcohol use: Yes     Comment: SOCIALLY   • Drug use: No   • Sexual activity: Defer       Family History:  History reviewed. No pertinent family history.     Review of Systems  See history of present illness and past medical history.  Patient denies headache, dizziness, syncope, falls, trauma, change in vision, change in hearing, change in taste, changes in weight, changes in appetite, focal weakness, numbness, or paresthesia.  Patient denies   palpitations, dyspnea, orthopnea, PND, cough, sinus pressure, rhinorrhea, epistaxis, hemoptysis, nausea, vomiting,hematemesis, diarrhea, constipation or hematochezia.  Denies cold or heat intolerance, polydipsia, polyuria, polyphagia. Denies hematuria, pyuria, dysuria, hesitancy, frequency or urgency. Denies consumption of raw and under cooked meats foods or change in water source.  Denies fever, chills, sweats, night sweats.  Denies missing any routine medications. Remainder of ROS is negative.    Objective:  T Max 24 hrs: Temp (24hrs), Av.6 °F (36.4 °C), Min:97.5 °F (36.4 °C), Max:97.7 °F (36.5 °C)    Vitals Ranges:   Temp:  [97.5 °F (36.4 °C)-97.7 °F (36.5 °C)] 97.7 °F (36.5 °C)  Heart Rate:  [59-75] 65  Resp:  [18] 18  BP: ()/(57-68) 104/66      Exam:  /66 (BP Location: Left arm, Patient Position: Sitting)   Pulse 65   Temp 97.7 °F (36.5 °C) (Oral)   Resp 18   Ht 172.7 cm (68\")   Wt 61.5 kg (135 lb 9.6 oz)   SpO2 100%   " BMI 20.62 kg/m²     General Appearance:    Alert, cooperative, no distress, appears stated age; thin   Head:    Normocephalic, without obvious abnormality, atraumatic   Eyes:    PERRL, conjunctivae/corneas clear, EOM's intact, both eyes   Ears:    Normal external ear canals, both ears   Nose:   Nares normal, septum midline, mucosa normal, no drainage    or sinus tenderness   Throat:   Lips, mucosa, and tongue normal   Neck:   Supple, symmetrical, trachea midline, no adenopathy;     thyroid:  no enlargement/tenderness/nodules; no carotid    bruit or JVD   Back:     Symmetric, no curvature, ROM normal, no CVA tenderness   Lungs:     Decreased breath sounds bilaterally, respirations unlabored   Chest Wall:    No tenderness or deformity    Heart:    Regular rate and rhythm, S1 and S2 normal, no murmur, rub   or gallop   Abdomen:     Soft, nontender, bowel sounds active all four quadrants,     no masses, no hepatomegaly, no splenomegaly   Extremities:   Extremities normal, atraumatic, no cyanosis or edema   Pulses:   2+ and symmetric all extremities   Skin:   Skin color, texture, turgor normal, no rashes or lesions               .    Data Review:  Labs in chart were reviewed.  WBC   Date Value Ref Range Status   05/31/2023 7.59 3.40 - 10.80 10*3/mm3 Final     Hemoglobin   Date Value Ref Range Status   05/31/2023 13.6 13.0 - 17.7 g/dL Final     Hematocrit   Date Value Ref Range Status   05/31/2023 40.3 37.5 - 51.0 % Final     Platelets   Date Value Ref Range Status   05/31/2023 179 140 - 450 10*3/mm3 Final     Sodium   Date Value Ref Range Status   05/31/2023 137 136 - 145 mmol/L Final     Potassium   Date Value Ref Range Status   05/31/2023 4.3 3.5 - 5.2 mmol/L Final     Chloride   Date Value Ref Range Status   05/31/2023 104 98 - 107 mmol/L Final     CO2   Date Value Ref Range Status   05/31/2023 24.0 22.0 - 29.0 mmol/L Final     BUN   Date Value Ref Range Status   05/31/2023 21 8 - 23 mg/dL Final     Creatinine   Date  Value Ref Range Status   05/31/2023 1.31 (H) 0.76 - 1.27 mg/dL Final     Glucose   Date Value Ref Range Status   05/31/2023 114 (H) 65 - 99 mg/dL Final                Imaging Results (All)     Procedure Component Value Units Date/Time    XR Chest 1 View [[id number]] Collected: 05/31/23 1633     Updated: 05/31/23 1637    Narrative:      XR CHEST 1 VW-     HISTORY: Male who is 82 years-old,  weakness     TECHNIQUE: Frontal view of the chest     COMPARISON: 12/25/2022     FINDINGS: The heart size is normal. Aorta is calcified. Pulmonary  vasculature is unremarkable. No focal pulmonary consolidation, pleural  effusion, or pneumothorax. Pulmonary hyperinflation suggests COPD,  correlate clinically. No acute osseous process.       Impression:      No focal pulmonary consolidation. Pulmonary hyperinflation.  Follow-up as clinical indications persist.     This report was finalized on 5/31/2023 4:34 PM by Dr. Depe Mitchell M.D.               Assessment:  Active Hospital Problems    Diagnosis  POA   • **Acute kidney injury [N17.9]  Yes      Resolved Hospital Problems   No resolved problems to display.   chest pain  Pad  Copd  Tobacco use  Alcohol dependence    Plan:  Cardiology was contacted by the ed  Plan is for stress test tomorrow  Monitor on telemetry  Troponin is slightly high but flat  dw patient and ed provider    Halina Wade MD  5/31/2023  21:17 EDT

## 2023-06-01 NOTE — CASE MANAGEMENT/SOCIAL WORK
Continued Stay Note  [facility]     Patient Name: Devin Scales  MRN: [id number]  Today's Date: 6/1/2023    Admit Date: 5/31/2023    Plan: Plan home with caregiver.   SUDHAKAR Ny RN   Discharge Plan     Row Name 06/01/23 1029       Plan    Plan Plan home with caregiver.   SUDHAKAR Ny RN    Patient/Family in Agreement with Plan yes    Plan Comments FACE SHEET VERIFIED/ MENDEZ SIGNED.   Spoke with pt at bedside.  Pt's PCP is Dr. Meron Starks.  Pt lives in a single story house with his caregiver   ( Nabila Scales  [phone]).  Pt is independent with ADLs.  Pt has a bath bench, cane and rollator for home use. Pt gets his prescriptions at [facility]  ([address]  Pt denies any issues affording medications.   Pt is not current with HH.  Pt has not been in SNF.  Pt states his caregiver  (Nabila Scales) will assist him at home and friend will transport him home.  Pt denies any needs.  Plan home with his caregiver.   SUDHAKAR Ny RN               Discharge Codes    No documentation.               Expected Discharge Date and Time     Expected Discharge Date Expected Discharge Time    Jun 1, 2023             Diana Ny RN

## 2023-06-01 NOTE — CASE MANAGEMENT/SOCIAL WORK
Discharge Planning Assessment  [facility]     Patient Name: Devin Scales  MRN: [id number]  Today's Date: 6/1/2023    Admit Date: 5/31/2023    Plan: Plan home with caregiver.   SUDHAKAR Ny RN   Discharge Needs Assessment     Row Name 06/01/23 1027       Living Environment    People in Home other (see comments)    Unique Family Situation Caregiver  ( Nabila Scales  [phone])    Current Living Arrangements home    Potentially Unsafe Housing Conditions none    Primary Care Provided by self    Provides Primary Care For no one    Family Caregiver if Needed child(pineda), adult;other (see comments)    Family Caregiver Names Caregiver  ( Nabila Scales  [phone]) and son  ( Devin Scales [phone])    Quality of Family Relationships helpful;involved;supportive    Able to Return to Prior Arrangements yes    Living Arrangement Comments Pt lives in a single story house with his caregiver   ( Nabila Scales  [phone]).       Resource/Environmental Concerns    Resource/Environmental Concerns none    Transportation Concerns none       Transition Planning    Patient/Family Anticipates Transition to home    Patient/Family Anticipated Services at Transition none    Transportation Anticipated family or friend will provide       Discharge Needs Assessment    Readmission Within the Last 30 Days no previous admission in last 30 days    Equipment Currently Used at Home bath bench;cane, straight;rollator    Concerns to be Addressed no discharge needs identified;denies needs/concerns at this time    Anticipated Changes Related to Illness none    Equipment Needed After Discharge bath bench;cane, straight;rollator               Discharge Plan     Row Name 06/01/23 1029       Plan    Plan Plan home with caregiver.   SUDHAKAR Ny RN    Patient/Family in Agreement with Plan yes    Plan Comments FACE SHEET VERIFIED/ MENDEZ SIGNED.   Spoke with pt at bedside.  Pt's PCP is Dr. Meron Starks.  Pt lives in a single story house with his  caregiver   ( Nabila Scales  [phone]).  Pt is independent with ADLs.  Pt has a bath bench, cane and rollator for home use. Pt gets his prescriptions at [facility]  ([address]  Pt denies any issues affording medications.   Pt is not current with HH.  Pt has not been in SNF.  Pt states his caregiver  (Nabila Scales) will assist him at home and friend will transport him home.  Pt denies any needs.  Plan home with his caregiver.   SUDHAKAR Ny RN              Continued Care and Services - Admitted Since 5/31/2023    Coordination has not been started for this encounter.       Expected Discharge Date and Time     Expected Discharge Date Expected Discharge Time    Jun 1, 2023          Demographic Summary     Row Name 06/01/23 1026       General Information    Admission Type observation    Arrived From emergency department    Required Notices Provided Observation Status Notice    Referral Source admission list    Reason for Consult discharge planning    Preferred Language English               Functional Status     Row Name 06/01/23 1027       Functional Status    Usual Activity Tolerance good    Current Activity Tolerance good       Functional Status, IADL    Medications independent    Meal Preparation assistive person    Housekeeping assistive person    Laundry assistive person    Shopping assistive person       Mental Status    General Appearance WDL WDL               Psychosocial    No documentation.                Abuse/Neglect    No documentation.                Legal    No documentation.                Substance Abuse    No documentation.                Patient Forms    No documentation.                   Diana Ny, INA

## 2023-06-01 NOTE — CASE MANAGEMENT/SOCIAL WORK
Case Management Discharge Note      Final Note: Pt discharged home with caregiver.    SUDHAKAR Ny RN         Selected Continued Care - Admitted Since 5/31/2023     Destination    No services have been selected for the patient.              Durable Medical Equipment    No services have been selected for the patient.              Dialysis/Infusion    No services have been selected for the patient.              Home Medical Care    No services have been selected for the patient.              Therapy    No services have been selected for the patient.              Community Resources    No services have been selected for the patient.              Community & DME    No services have been selected for the patient.                  Transportation Services  Private: Car    Final Discharge Disposition Code: 01 - home or self-care

## 2025-01-01 ENCOUNTER — HOSPITAL ENCOUNTER (INPATIENT)
Facility: HOSPITAL | Age: 85
LOS: 8 days | Discharge: HOSPICE | DRG: 177 | End: 2025-08-23
Attending: EMERGENCY MEDICINE | Admitting: INTERNAL MEDICINE
Payer: MEDICARE

## 2025-01-01 ENCOUNTER — APPOINTMENT (OUTPATIENT)
Dept: GENERAL RADIOLOGY | Facility: HOSPITAL | Age: 85
DRG: 177 | End: 2025-01-01
Payer: MEDICARE

## 2025-01-01 ENCOUNTER — HOSPITAL ENCOUNTER (INPATIENT)
Facility: HOSPITAL | Age: 85
LOS: 2 days | End: 2025-08-25
Attending: STUDENT IN AN ORGANIZED HEALTH CARE EDUCATION/TRAINING PROGRAM | Admitting: STUDENT IN AN ORGANIZED HEALTH CARE EDUCATION/TRAINING PROGRAM
Payer: COMMERCIAL

## 2025-01-01 VITALS — TEMPERATURE: 93.2 F | DIASTOLIC BLOOD PRESSURE: 36 MMHG | SYSTOLIC BLOOD PRESSURE: 65 MMHG

## 2025-01-01 PROCEDURE — 25010000002 HYDROMORPHONE PER 4 MG: Performed by: NURSE PRACTITIONER

## 2025-01-01 PROCEDURE — 25010000002 DIAZEPAM PER 5 MG: Performed by: NURSE PRACTITIONER

## 2025-01-01 RX ORDER — HYDROMORPHONE HYDROCHLORIDE 1 MG/ML
0.5 INJECTION, SOLUTION INTRAMUSCULAR; INTRAVENOUS; SUBCUTANEOUS EVERY 4 HOURS
Status: DISCONTINUED | OUTPATIENT
Start: 2025-01-01 | End: 2025-01-01 | Stop reason: HOSPADM

## 2025-01-01 RX ORDER — GLYCOPYRROLATE 0.2 MG/ML
0.4 INJECTION INTRAMUSCULAR; INTRAVENOUS
Status: DISCONTINUED | OUTPATIENT
Start: 2025-01-01 | End: 2025-01-01 | Stop reason: HOSPADM

## 2025-01-01 RX ORDER — BISACODYL 10 MG
10 SUPPOSITORY, RECTAL RECTAL DAILY PRN
Status: DISCONTINUED | OUTPATIENT
Start: 2025-01-01 | End: 2025-01-01 | Stop reason: HOSPADM

## 2025-01-01 RX ORDER — ACETAMINOPHEN 325 MG/1
650 TABLET ORAL EVERY 4 HOURS PRN
Status: DISCONTINUED | OUTPATIENT
Start: 2025-01-01 | End: 2025-01-01

## 2025-01-01 RX ORDER — DIAZEPAM 10 MG/2ML
5 INJECTION, SOLUTION INTRAMUSCULAR; INTRAVENOUS
Status: DISCONTINUED | OUTPATIENT
Start: 2025-01-01 | End: 2025-01-01 | Stop reason: HOSPADM

## 2025-01-01 RX ORDER — DIAZEPAM 10 MG/2ML
5 INJECTION, SOLUTION INTRAMUSCULAR; INTRAVENOUS EVERY 4 HOURS PRN
Status: DISCONTINUED | OUTPATIENT
Start: 2025-01-01 | End: 2025-01-01

## 2025-01-01 RX ORDER — DIAZEPAM 10 MG/2ML
5 INJECTION, SOLUTION INTRAMUSCULAR; INTRAVENOUS EVERY 4 HOURS
Status: DISCONTINUED | OUTPATIENT
Start: 2025-01-01 | End: 2025-01-01 | Stop reason: HOSPADM

## 2025-01-01 RX ORDER — BISACODYL 5 MG/1
5 TABLET, DELAYED RELEASE ORAL DAILY PRN
Status: DISCONTINUED | OUTPATIENT
Start: 2025-01-01 | End: 2025-01-01 | Stop reason: HOSPADM

## 2025-01-01 RX ORDER — HYDROMORPHONE HYDROCHLORIDE 1 MG/ML
0.5 INJECTION, SOLUTION INTRAMUSCULAR; INTRAVENOUS; SUBCUTANEOUS
Status: DISCONTINUED | OUTPATIENT
Start: 2025-01-01 | End: 2025-01-01 | Stop reason: HOSPADM

## 2025-01-01 RX ORDER — ONDANSETRON 2 MG/ML
4 INJECTION INTRAMUSCULAR; INTRAVENOUS EVERY 6 HOURS PRN
Status: DISCONTINUED | OUTPATIENT
Start: 2025-01-01 | End: 2025-01-01 | Stop reason: HOSPADM

## 2025-01-01 RX ORDER — SODIUM CHLORIDE 0.9 % (FLUSH) 0.9 %
10 SYRINGE (ML) INJECTION EVERY 12 HOURS SCHEDULED
Status: DISCONTINUED | OUTPATIENT
Start: 2025-01-01 | End: 2025-01-01 | Stop reason: HOSPADM

## 2025-01-01 RX ORDER — SODIUM CHLORIDE 9 MG/ML
40 INJECTION, SOLUTION INTRAVENOUS AS NEEDED
Status: DISCONTINUED | OUTPATIENT
Start: 2025-01-01 | End: 2025-01-01

## 2025-01-01 RX ORDER — ACETAMINOPHEN 160 MG/5ML
650 SOLUTION ORAL EVERY 4 HOURS PRN
Status: DISCONTINUED | OUTPATIENT
Start: 2025-01-01 | End: 2025-01-01

## 2025-01-01 RX ORDER — NITROGLYCERIN 0.4 MG/1
0.4 TABLET SUBLINGUAL
Status: DISCONTINUED | OUTPATIENT
Start: 2025-01-01 | End: 2025-01-01 | Stop reason: HOSPADM

## 2025-01-01 RX ORDER — SODIUM CHLORIDE 0.9 % (FLUSH) 0.9 %
10 SYRINGE (ML) INJECTION AS NEEDED
Status: DISCONTINUED | OUTPATIENT
Start: 2025-01-01 | End: 2025-01-01

## 2025-01-01 RX ORDER — ACETAMINOPHEN 650 MG/1
650 SUPPOSITORY RECTAL EVERY 4 HOURS PRN
Status: DISCONTINUED | OUTPATIENT
Start: 2025-01-01 | End: 2025-01-01 | Stop reason: HOSPADM

## 2025-01-01 RX ORDER — HYDROMORPHONE HYDROCHLORIDE 1 MG/ML
0.5 INJECTION, SOLUTION INTRAMUSCULAR; INTRAVENOUS; SUBCUTANEOUS
Status: DISCONTINUED | OUTPATIENT
Start: 2025-01-01 | End: 2025-01-01

## 2025-01-01 RX ORDER — AMOXICILLIN 250 MG
2 CAPSULE ORAL 2 TIMES DAILY PRN
Status: DISCONTINUED | OUTPATIENT
Start: 2025-01-01 | End: 2025-01-01

## 2025-01-01 RX ORDER — POLYETHYLENE GLYCOL 3350 17 G/17G
17 POWDER, FOR SOLUTION ORAL DAILY PRN
Status: DISCONTINUED | OUTPATIENT
Start: 2025-01-01 | End: 2025-01-01

## 2025-01-01 RX ORDER — SODIUM CHLORIDE 0.9 % (FLUSH) 0.9 %
10 SYRINGE (ML) INJECTION AS NEEDED
Status: DISCONTINUED | OUTPATIENT
Start: 2025-01-01 | End: 2025-01-01 | Stop reason: HOSPADM

## 2025-01-01 RX ADMIN — HYDROMORPHONE HYDROCHLORIDE 0.5 MG: 1 INJECTION, SOLUTION INTRAMUSCULAR; INTRAVENOUS; SUBCUTANEOUS at 20:51

## 2025-01-01 RX ADMIN — HYDROMORPHONE HYDROCHLORIDE 0.5 MG: 1 INJECTION, SOLUTION INTRAMUSCULAR; INTRAVENOUS; SUBCUTANEOUS at 09:40

## 2025-01-01 RX ADMIN — HYDROMORPHONE HYDROCHLORIDE 0.5 MG: 1 INJECTION, SOLUTION INTRAMUSCULAR; INTRAVENOUS; SUBCUTANEOUS at 22:04

## 2025-01-01 RX ADMIN — DIAZEPAM 5 MG: 5 INJECTION, SOLUTION INTRAMUSCULAR; INTRAVENOUS at 00:48

## 2025-01-01 RX ADMIN — HYDROMORPHONE HYDROCHLORIDE 0.5 MG: 1 INJECTION, SOLUTION INTRAMUSCULAR; INTRAVENOUS; SUBCUTANEOUS at 17:38

## 2025-01-01 RX ADMIN — DIAZEPAM 5 MG: 5 INJECTION, SOLUTION INTRAMUSCULAR; INTRAVENOUS at 09:40

## 2025-01-01 RX ADMIN — Medication 10 ML: at 22:05

## 2025-01-01 RX ADMIN — DIAZEPAM 5 MG: 5 INJECTION, SOLUTION INTRAMUSCULAR; INTRAVENOUS at 20:51

## 2025-01-01 RX ADMIN — HYDROMORPHONE HYDROCHLORIDE 0.5 MG: 1 INJECTION, SOLUTION INTRAMUSCULAR; INTRAVENOUS; SUBCUTANEOUS at 00:48

## 2025-01-01 RX ADMIN — Medication 10 ML: at 20:52

## 2025-01-01 RX ADMIN — DIAZEPAM 5 MG: 5 INJECTION, SOLUTION INTRAMUSCULAR; INTRAVENOUS at 13:44

## 2025-01-01 RX ADMIN — Medication 10 ML: at 09:40

## 2025-01-01 RX ADMIN — DIAZEPAM 5 MG: 5 INJECTION, SOLUTION INTRAMUSCULAR; INTRAVENOUS at 17:38

## 2025-01-01 RX ADMIN — DIAZEPAM 5 MG: 5 INJECTION, SOLUTION INTRAMUSCULAR; INTRAVENOUS at 05:39

## 2025-01-01 RX ADMIN — HYDROMORPHONE HYDROCHLORIDE 0.5 MG: 1 INJECTION, SOLUTION INTRAMUSCULAR; INTRAVENOUS; SUBCUTANEOUS at 13:44

## 2025-01-01 RX ADMIN — DIAZEPAM 5 MG: 5 INJECTION, SOLUTION INTRAMUSCULAR; INTRAVENOUS at 22:07

## 2025-01-01 RX ADMIN — HYDROMORPHONE HYDROCHLORIDE 0.5 MG: 1 INJECTION, SOLUTION INTRAMUSCULAR; INTRAVENOUS; SUBCUTANEOUS at 05:39

## 2025-07-25 ENCOUNTER — APPOINTMENT (OUTPATIENT)
Dept: CT IMAGING | Facility: HOSPITAL | Age: 85
End: 2025-07-25
Payer: MEDICARE

## 2025-07-25 ENCOUNTER — APPOINTMENT (OUTPATIENT)
Dept: GENERAL RADIOLOGY | Facility: HOSPITAL | Age: 85
End: 2025-07-25
Payer: MEDICARE

## 2025-07-25 ENCOUNTER — HOSPITAL ENCOUNTER (INPATIENT)
Facility: HOSPITAL | Age: 85
LOS: 7 days | Discharge: NURSING, REHAB OR LONG-TERM CARE | End: 2025-08-01
Attending: EMERGENCY MEDICINE | Admitting: HOSPITALIST
Payer: MEDICARE

## 2025-07-25 DIAGNOSIS — N17.9 AKI (ACUTE KIDNEY INJURY): ICD-10-CM

## 2025-07-25 DIAGNOSIS — I60.9 SAH (SUBARACHNOID HEMORRHAGE): Primary | ICD-10-CM

## 2025-07-25 DIAGNOSIS — S01.81XA LACERATION OF FOREHEAD, INITIAL ENCOUNTER: ICD-10-CM

## 2025-07-25 PROBLEM — S06.6XAA SUBARACHNOID HEMATOMA: Status: ACTIVE | Noted: 2025-07-25

## 2025-07-25 LAB
ALBUMIN SERPL-MCNC: 3.4 G/DL (ref 3.5–5.2)
ALBUMIN/GLOB SERPL: 1.1 G/DL
ALP SERPL-CCNC: 64 U/L (ref 39–117)
ALT SERPL W P-5'-P-CCNC: 19 U/L (ref 1–41)
AMPHET+METHAMPHET UR QL: NEGATIVE
AMPHETAMINES UR QL: NEGATIVE
ANION GAP SERPL CALCULATED.3IONS-SCNC: 11 MMOL/L (ref 5–15)
AST SERPL-CCNC: 23 U/L (ref 1–40)
BACTERIA UR QL AUTO: ABNORMAL /HPF
BARBITURATES UR QL SCN: NEGATIVE
BASOPHILS # BLD AUTO: 0.05 10*3/MM3 (ref 0–0.2)
BASOPHILS NFR BLD AUTO: 0.5 % (ref 0–1.5)
BENZODIAZ UR QL SCN: NEGATIVE
BILIRUB SERPL-MCNC: 0.5 MG/DL (ref 0–1.2)
BILIRUB UR QL STRIP: NEGATIVE
BUN SERPL-MCNC: 34 MG/DL (ref 8–23)
BUN/CREAT SERPL: 11.1 (ref 7–25)
BUPRENORPHINE SERPL-MCNC: NEGATIVE NG/ML
CALCIUM SPEC-SCNC: 9.2 MG/DL (ref 8.6–10.5)
CANNABINOIDS SERPL QL: NEGATIVE
CHLORIDE SERPL-SCNC: 104 MMOL/L (ref 98–107)
CK SERPL-CCNC: 172 U/L (ref 20–200)
CLARITY UR: CLEAR
CO2 SERPL-SCNC: 26 MMOL/L (ref 22–29)
COCAINE UR QL: NEGATIVE
COLOR UR: YELLOW
CREAT SERPL-MCNC: 3.05 MG/DL (ref 0.76–1.27)
DEPRECATED RDW RBC AUTO: 42.7 FL (ref 37–54)
EGFRCR SERPLBLD CKD-EPI 2021: 19.7 ML/MIN/1.73
EOSINOPHIL # BLD AUTO: 0.13 10*3/MM3 (ref 0–0.4)
EOSINOPHIL NFR BLD AUTO: 1.3 % (ref 0.3–6.2)
ERYTHROCYTE [DISTWIDTH] IN BLOOD BY AUTOMATED COUNT: 12.1 % (ref 12.3–15.4)
ETHANOL BLD-MCNC: <10 MG/DL (ref 0–10)
ETHANOL UR QL: <0.01 %
FENTANYL UR-MCNC: NEGATIVE NG/ML
GEN 5 1HR TROPONIN T REFLEX: 59 NG/L
GLOBULIN UR ELPH-MCNC: 3.1 GM/DL
GLUCOSE BLDC GLUCOMTR-MCNC: 103 MG/DL (ref 70–130)
GLUCOSE BLDC GLUCOMTR-MCNC: 76 MG/DL (ref 70–130)
GLUCOSE BLDC GLUCOMTR-MCNC: 92 MG/DL (ref 70–130)
GLUCOSE SERPL-MCNC: 97 MG/DL (ref 65–99)
GLUCOSE UR STRIP-MCNC: NEGATIVE MG/DL
HCT VFR BLD AUTO: 31.5 % (ref 37.5–51)
HGB BLD-MCNC: 10.5 G/DL (ref 13–17.7)
HGB UR QL STRIP.AUTO: NEGATIVE
HYALINE CASTS UR QL AUTO: ABNORMAL /LPF
IMM GRANULOCYTES # BLD AUTO: 0.04 10*3/MM3 (ref 0–0.05)
IMM GRANULOCYTES NFR BLD AUTO: 0.4 % (ref 0–0.5)
KETONES UR QL STRIP: NEGATIVE
LEUKOCYTE ESTERASE UR QL STRIP.AUTO: ABNORMAL
LYMPHOCYTES # BLD AUTO: 1.25 10*3/MM3 (ref 0.7–3.1)
LYMPHOCYTES NFR BLD AUTO: 12.4 % (ref 19.6–45.3)
MCH RBC QN AUTO: 32.5 PG (ref 26.6–33)
MCHC RBC AUTO-ENTMCNC: 33.3 G/DL (ref 31.5–35.7)
MCV RBC AUTO: 97.5 FL (ref 79–97)
METHADONE UR QL SCN: NEGATIVE
MONOCYTES # BLD AUTO: 0.65 10*3/MM3 (ref 0.1–0.9)
MONOCYTES NFR BLD AUTO: 6.4 % (ref 5–12)
NEUTROPHILS NFR BLD AUTO: 7.98 10*3/MM3 (ref 1.7–7)
NEUTROPHILS NFR BLD AUTO: 79 % (ref 42.7–76)
NITRITE UR QL STRIP: NEGATIVE
NRBC BLD AUTO-RTO: 0 /100 WBC (ref 0–0.2)
NT-PROBNP SERPL-MCNC: 579 PG/ML (ref 0–1800)
OPIATES UR QL: NEGATIVE
OXYCODONE UR QL SCN: NEGATIVE
PCP UR QL SCN: NEGATIVE
PH UR STRIP.AUTO: 5.5 [PH] (ref 5–8)
PLATELET # BLD AUTO: 286 10*3/MM3 (ref 140–450)
PMV BLD AUTO: 9.3 FL (ref 6–12)
POTASSIUM SERPL-SCNC: 5 MMOL/L (ref 3.5–5.2)
PROT SERPL-MCNC: 6.5 G/DL (ref 6–8.5)
PROT UR QL STRIP: NEGATIVE
RBC # BLD AUTO: 3.23 10*6/MM3 (ref 4.14–5.8)
RBC # UR STRIP: ABNORMAL /HPF
REF LAB TEST METHOD: ABNORMAL
SODIUM SERPL-SCNC: 141 MMOL/L (ref 136–145)
SP GR UR STRIP: 1.01 (ref 1–1.03)
SQUAMOUS #/AREA URNS HPF: ABNORMAL /HPF
TRICYCLICS UR QL SCN: NEGATIVE
TROPONIN T % DELTA: 11
TROPONIN T NUMERIC DELTA: 6 NG/L
TROPONIN T SERPL HS-MCNC: 53 NG/L
UROBILINOGEN UR QL STRIP: ABNORMAL
WBC # UR STRIP: ABNORMAL /HPF
WBC NRBC COR # BLD AUTO: 10.1 10*3/MM3 (ref 3.4–10.8)

## 2025-07-25 PROCEDURE — 82948 REAGENT STRIP/BLOOD GLUCOSE: CPT

## 2025-07-25 PROCEDURE — 25010000002 HYDRALAZINE PER 20 MG: Performed by: HOSPITALIST

## 2025-07-25 PROCEDURE — 81001 URINALYSIS AUTO W/SCOPE: CPT | Performed by: EMERGENCY MEDICINE

## 2025-07-25 PROCEDURE — 93010 ELECTROCARDIOGRAM REPORT: CPT | Performed by: INTERNAL MEDICINE

## 2025-07-25 PROCEDURE — 25010000002 CEFAZOLIN PER 500 MG: Performed by: HOSPITALIST

## 2025-07-25 PROCEDURE — 71045 X-RAY EXAM CHEST 1 VIEW: CPT

## 2025-07-25 PROCEDURE — 70450 CT HEAD/BRAIN W/O DYE: CPT

## 2025-07-25 PROCEDURE — 36415 COLL VENOUS BLD VENIPUNCTURE: CPT

## 2025-07-25 PROCEDURE — 80307 DRUG TEST PRSMV CHEM ANLYZR: CPT | Performed by: EMERGENCY MEDICINE

## 2025-07-25 PROCEDURE — 25010000002 LIDOCAINE 1% - EPINEPHRINE 1:100000 1 %-1:100000 SOLUTION: Performed by: EMERGENCY MEDICINE

## 2025-07-25 PROCEDURE — 99291 CRITICAL CARE FIRST HOUR: CPT

## 2025-07-25 PROCEDURE — 93005 ELECTROCARDIOGRAM TRACING: CPT | Performed by: EMERGENCY MEDICINE

## 2025-07-25 PROCEDURE — 82550 ASSAY OF CK (CPK): CPT | Performed by: EMERGENCY MEDICINE

## 2025-07-25 PROCEDURE — 85025 COMPLETE CBC W/AUTO DIFF WBC: CPT | Performed by: EMERGENCY MEDICINE

## 2025-07-25 PROCEDURE — 25010000002 FENTANYL CITRATE (PF) 50 MCG/ML SOLUTION

## 2025-07-25 PROCEDURE — 83880 ASSAY OF NATRIURETIC PEPTIDE: CPT | Performed by: EMERGENCY MEDICINE

## 2025-07-25 PROCEDURE — 82077 ASSAY SPEC XCP UR&BREATH IA: CPT | Performed by: EMERGENCY MEDICINE

## 2025-07-25 PROCEDURE — 93005 ELECTROCARDIOGRAM TRACING: CPT | Performed by: HOSPITALIST

## 2025-07-25 PROCEDURE — 84484 ASSAY OF TROPONIN QUANT: CPT | Performed by: EMERGENCY MEDICINE

## 2025-07-25 PROCEDURE — 72125 CT NECK SPINE W/O DYE: CPT

## 2025-07-25 PROCEDURE — 80053 COMPREHEN METABOLIC PANEL: CPT | Performed by: EMERGENCY MEDICINE

## 2025-07-25 PROCEDURE — 93005 ELECTROCARDIOGRAM TRACING: CPT

## 2025-07-25 PROCEDURE — 99221 1ST HOSP IP/OBS SF/LOW 40: CPT | Performed by: NURSE PRACTITIONER

## 2025-07-25 RX ORDER — ACETAMINOPHEN 650 MG/1
650 SUPPOSITORY RECTAL EVERY 4 HOURS PRN
Status: DISCONTINUED | OUTPATIENT
Start: 2025-07-25 | End: 2025-08-01 | Stop reason: HOSPADM

## 2025-07-25 RX ORDER — LIDOCAINE HYDROCHLORIDE AND EPINEPHRINE 10; 10 MG/ML; UG/ML
10 INJECTION, SOLUTION INFILTRATION; PERINEURAL ONCE
Status: COMPLETED | OUTPATIENT
Start: 2025-07-25 | End: 2025-07-25

## 2025-07-25 RX ORDER — FENTANYL CITRATE 50 UG/ML
50 INJECTION, SOLUTION INTRAMUSCULAR; INTRAVENOUS
Refills: 0 | Status: DISPENSED | OUTPATIENT
Start: 2025-07-25 | End: 2025-07-30

## 2025-07-25 RX ORDER — ACETAMINOPHEN 325 MG/1
650 TABLET ORAL EVERY 4 HOURS PRN
Status: DISCONTINUED | OUTPATIENT
Start: 2025-07-25 | End: 2025-08-01 | Stop reason: HOSPADM

## 2025-07-25 RX ORDER — AMOXICILLIN 250 MG
2 CAPSULE ORAL 2 TIMES DAILY
Status: DISCONTINUED | OUTPATIENT
Start: 2025-07-25 | End: 2025-08-01 | Stop reason: HOSPADM

## 2025-07-25 RX ORDER — ONDANSETRON 4 MG/1
4 TABLET, ORALLY DISINTEGRATING ORAL EVERY 6 HOURS PRN
Status: DISCONTINUED | OUTPATIENT
Start: 2025-07-25 | End: 2025-08-01 | Stop reason: HOSPADM

## 2025-07-25 RX ORDER — ONDANSETRON 2 MG/ML
4 INJECTION INTRAMUSCULAR; INTRAVENOUS EVERY 6 HOURS PRN
Status: DISCONTINUED | OUTPATIENT
Start: 2025-07-25 | End: 2025-08-01 | Stop reason: HOSPADM

## 2025-07-25 RX ORDER — BISACODYL 5 MG/1
5 TABLET, DELAYED RELEASE ORAL DAILY PRN
Status: DISCONTINUED | OUTPATIENT
Start: 2025-07-25 | End: 2025-08-01 | Stop reason: HOSPADM

## 2025-07-25 RX ORDER — HYDRALAZINE HYDROCHLORIDE 20 MG/ML
10 INJECTION INTRAMUSCULAR; INTRAVENOUS AS NEEDED
Status: DISCONTINUED | OUTPATIENT
Start: 2025-07-25 | End: 2025-08-01 | Stop reason: HOSPADM

## 2025-07-25 RX ORDER — BISACODYL 10 MG
10 SUPPOSITORY, RECTAL RECTAL DAILY PRN
Status: DISCONTINUED | OUTPATIENT
Start: 2025-07-25 | End: 2025-08-01 | Stop reason: HOSPADM

## 2025-07-25 RX ORDER — ALUMINA, MAGNESIA, AND SIMETHICONE 2400; 2400; 240 MG/30ML; MG/30ML; MG/30ML
15 SUSPENSION ORAL EVERY 6 HOURS PRN
Status: DISCONTINUED | OUTPATIENT
Start: 2025-07-25 | End: 2025-08-01 | Stop reason: HOSPADM

## 2025-07-25 RX ORDER — NITROGLYCERIN 0.4 MG/1
0.4 TABLET SUBLINGUAL
Status: DISCONTINUED | OUTPATIENT
Start: 2025-07-25 | End: 2025-08-01 | Stop reason: HOSPADM

## 2025-07-25 RX ORDER — IPRATROPIUM BROMIDE AND ALBUTEROL SULFATE 2.5; .5 MG/3ML; MG/3ML
3 SOLUTION RESPIRATORY (INHALATION)
Status: DISCONTINUED | OUTPATIENT
Start: 2025-07-25 | End: 2025-07-26

## 2025-07-25 RX ORDER — POLYETHYLENE GLYCOL 3350 17 G/17G
17 POWDER, FOR SOLUTION ORAL DAILY PRN
Status: DISCONTINUED | OUTPATIENT
Start: 2025-07-25 | End: 2025-08-01 | Stop reason: HOSPADM

## 2025-07-25 RX ADMIN — CEFAZOLIN 2000 MG: 2 INJECTION, POWDER, FOR SOLUTION INTRAMUSCULAR; INTRAVENOUS at 16:57

## 2025-07-25 RX ADMIN — LIDOCAINE HYDROCHLORIDE AND EPINEPHRINE 10 ML: 10; 10 INJECTION, SOLUTION INFILTRATION; PERINEURAL at 15:45

## 2025-07-25 RX ADMIN — FENTANYL CITRATE 50 MCG: 50 INJECTION, SOLUTION INTRAMUSCULAR; INTRAVENOUS at 20:32

## 2025-07-25 RX ADMIN — HYDRALAZINE HYDROCHLORIDE 10 MG: 20 INJECTION INTRAMUSCULAR; INTRAVENOUS at 20:02

## 2025-07-25 RX ADMIN — HYDRALAZINE HYDROCHLORIDE 10 MG: 20 INJECTION INTRAMUSCULAR; INTRAVENOUS at 17:01

## 2025-07-25 NOTE — PROGRESS NOTES
[facility]: Cefazolin Consult    Pt Name: Devin Scales   : 1940  Weight: 58 kg (127 lb 13.9 oz)  Antibiotic: cefazolin  Indication: Skin and Soft Tissue    Relevant clinical data and objective history reviewed:    Past Medical History:   Diagnosis Date    Benign essential tremor     BPH (benign prostatic hyperplasia)     Chest pain     Dyspnea     Fatigue     Hyperlipidemia      Creatinine   Date Value Ref Range Status   2025 3.05 (H) 0.76 - 1.27 mg/dL Final   2023 0.90 0.76 - 1.27 mg/dL Final   2023 1.31 (H) 0.76 - 1.27 mg/dL Final   10/11/2021 0.98 0.70 - 1.18 mg/dL Final     Comment:     For patients >49 years of age, the reference limit  for Creatinine is approximately 13% higher for people  identified as -American.   2020 1.14 0.70 - 1.18 mg/dL Final     Comment:       Result Comment:   For patients >49 years of age, the reference limit  for Creatinine is approximately 13% higher for people  identified as -American.   2020 1.02 0.70 - 1.18 mg/dL Final     Comment:       Result Comment:   For patients >49 years of age, the reference limit  for Creatinine is approximately 13% higher for people  identified as -American.     BUN   Date Value Ref Range Status   2025 34.0 (H) 8.0 - 23.0 mg/dL Final   10/11/2021 17 7 - 25 mg/dL Final     Estimated Creatinine Clearance: 15.3 mL/min (A) (by C-G formula based on SCr of 3.05 mg/dL (H)).    Lab Results   Component Value Date    WBC 10.10 2025     Temp Readings from Last 3 Encounters:   25 97.6 °F (36.4 °C) (Oral)   23 98.6 °F (37 °C) (Oral)   03/15/23 97.7 °F (36.5 °C) (Temporal)      Assessment/Plan    Ordered cefazolin 2 g IV q12h (renally adjusted) for a total of 5 days. Will monitor and adjust if culture data or pertinent lab values indicate this is best for the patient.     Thank you for this consult and please contact pharmacy with any questions or  concerns.     Rochelle Castillo [facility]  Clinical Pharmacist

## 2025-07-25 NOTE — ED NOTES
Nursing report ED to floor  Devin Scales  84 y.o.  male    HPI :  HPI  Stated Reason for Visit: multiple falls, Head lac, ,fell 2 days ago, L leg swelling  History Obtained From: patient  Did patient miss a scheduled dialysis appointment?: No    Chief Complaint  Chief Complaint   Patient presents with    Fall     Head lac, L leg swelling and redness   Multiple falls        Admitting doctor:   Geovany Olsen MD    Admitting diagnosis:   The primary encounter diagnosis was SAH (subarachnoid hemorrhage). Diagnoses of STEVENSON (acute kidney injury) and Laceration of forehead, initial encounter were also pertinent to this visit.    Code status:   Current Code Status       Date Active Code Status Order ID Comments User Context       7/25/2025 1613 CPR (Attempt to Resuscitate) [id number]  Geovany Olsen MD ED        Question Answer    Code Status (Patient has no pulse and is not breathing) CPR (Attempt to Resuscitate)    Medical Interventions (Patient has pulse or is breathing) Full Support                    Allergies:   Patient has no known allergies.    Isolation:   No active isolations    Intake and Output    Intake/Output Summary (Last 24 hours) at 7/25/2025 1632  Last data filed at 7/25/2025 1513  Gross per 24 hour   Intake --   Output 800 ml   Net -800 ml       Weight:   There were no vitals filed for this visit.    Most recent vitals:   Vitals:    07/25/25 1431 07/25/25 1536 07/25/25 1601 07/25/25 1628   BP: 152/74 150/78 149/66    Pulse: 73 84 72    Resp:    20   Temp:       TempSrc:       SpO2: 100% 100% 98%        Active LDAs/IV Access:   Lines, Drains & Airways       Active LDAs       Name Placement date Placement time Site Days    Peripheral IV 07/25/25 1435 20 G Anterior;Left Forearm 07/25/25  1435  Forearm  less than 1    Peripheral IV 07/25/25 1621 20 G Anterior;Right Forearm 07/25/25  1621  Forearm  less than 1                    Labs (abnormal labs have a star):   Labs Reviewed   COMPREHENSIVE  METABOLIC PANEL - Abnormal; Notable for the following components:       Result Value    BUN 34.0 (*)     Creatinine 3.05 (*)     Albumin 3.4 (*)     eGFR 19.7 (*)     All other components within normal limits    Narrative:     GFR Categories in Chronic Kidney Disease (CKD)              GFR Category          GFR (mL/min/1.73)    Interpretation  G1                    90 or greater        Normal or high (1)  G2                    60-89                Mild decrease (1)  G3a                   45-59                Mild to moderate decrease  G3b                   30-44                Moderate to severe decrease  G4                    15-29                Severe decrease  G5                    14 or less           Kidney failure    (1)In the absence of evidence of kidney disease, neither GFR category G1 or G2 fulfill the criteria for CKD.    eGFR calculation 2021 CKD-EPI creatinine equation, which does not include race as a factor   URINALYSIS W/ MICROSCOPIC IF INDICATED (NO CULTURE) - Abnormal; Notable for the following components:    Leuk Esterase, UA Small (1+) (*)     All other components within normal limits   TROPONIN - Abnormal; Notable for the following components:    HS Troponin T 53 (*)     All other components within normal limits    Narrative:     High Sensitive Troponin T Reference Range:  <14.0 ng/L- Negative Female for AMI  <22.0 ng/L- Negative Male for AMI  >=14 - Abnormal Female indicating possible myocardial injury.  >=22 - Abnormal Male indicating possible myocardial injury.   Clinicians would have to utilize clinical acumen, EKG, Troponin, and serial changes to determine if it is an Acute Myocardial Infarction or myocardial injury due to an underlying chronic condition.        CBC WITH AUTO DIFFERENTIAL - Abnormal; Notable for the following components:    RBC 3.23 (*)     Hemoglobin 10.5 (*)     Hematocrit 31.5 (*)     MCV 97.5 (*)     RDW 12.1 (*)     Neutrophil % 79.0 (*)     Lymphocyte % 12.4 (*)      Neutrophils, Absolute 7.98 (*)     All other components within normal limits   URINALYSIS, MICROSCOPIC ONLY - Abnormal; Notable for the following components:    WBC, UA 11-20 (*)     All other components within normal limits   HIGH SENSITIVITIY TROPONIN T 1HR - Abnormal; Notable for the following components:    HS Troponin T 59 (*)     All other components within normal limits    Narrative:     High Sensitive Troponin T Reference Range:  <14.0 ng/L- Negative Female for AMI  <22.0 ng/L- Negative Male for AMI  >=14 - Abnormal Female indicating possible myocardial injury.  >=22 - Abnormal Male indicating possible myocardial injury.   Clinicians would have to utilize clinical acumen, EKG, Troponin, and serial changes to determine if it is an Acute Myocardial Infarction or myocardial injury due to an underlying chronic condition.        BNP (IN-HOUSE) - Normal    Narrative:     This assay is used as an aid in the diagnosis of individuals suspected of having heart failure. It can be used as an aid in the diagnosis of acute decompensated heart failure (ADHF) in patients presenting with signs and symptoms of ADHF to the emergency department (ED). In addition, NT-proBNP of <300 pg/mL indicates ADHF is not likely.    Age Range Result Interpretation  NT-proBNP Concentration (pg/mL:      <50             Positive            >450                   Gray                 300-450                    Negative             <300    50-75           Positive            >900                  Gray                300-900                  Negative            <300      >75             Positive            >1800                  Gray                300-1800                  Negative            <300   URINE DRUG SCREEN - Normal    Narrative:     Cutoff For Drugs Screened:    Amphetamines               500 ng/ml  Barbiturates               200 ng/ml  Benzodiazepines            150 ng/ml  Cocaine                    150 ng/ml  Methadone                   200 ng/ml  Opiates                    100 ng/ml  Phencyclidine               25 ng/ml  THC                         50 ng/ml  Methamphetamine            500 ng/ml  Tricyclic Antidepressants  300 ng/ml  Oxycodone                  100 ng/ml  Buprenorphine               10 ng/ml    The normal value for all drugs tested is negative. This report includes unconfirmed screening results, with the cutoff values listed, to be used for medical treatment purposes only.  Unconfirmed results must not be used for non-medical purposes such as employment or legal testing.  Clinical consideration should be applied to any drug of abuse test, particularly when unconfirmed results are used.     CK - Normal   FENTANYL, URINE - Normal    Narrative:     Negative Threshold:      Fentanyl 5 ng/mL     The normal value for the drug tested is negative. This report includes final unconfirmed screening results to be used for medical treatment purposes only. Unconfirmed results must not be used for non-medical purposes such as employment or legal testing. Clinical consideration should be applied to any drug of abuse test, particularly when unconfirmed results are used.          ETHANOL    Narrative:     Not for legal purposes.   PROTIME-INR   APTT   CBC AND DIFFERENTIAL    Narrative:     The following orders were created for panel order CBC & Differential.  Procedure                               Abnormality         Status                     ---------                               -----------         ------                     CBC Auto Differential[[id number]]        Abnormal            Final result                 Please view results for these tests on the individual orders.       EKG:   ECG 12 Lead Altered Mental Status   Preliminary Result   HEART RATE=71  bpm   RR Vupmqijp=050  ms   MT Wkedqtpj=208  ms   P Horizontal Axis=7  deg   P Front Axis=95  deg   QRSD Interval=92  ms   QT Niumddlu=932  ms   OUpO=108  ms   QRS Axis=-38  deg   T Wave  Axis=71  deg   - ABNORMAL ECG -   Sinus rhythm   Prolonged IA interval   Left axis deviation   Probable anteroseptal infarct, recent   Date and Time of Study:2025-07-25 14:36:26          Meds given in ED:   Medications   Pharmacy To Dose: Cefazolin (has no administration in time range)   ipratropium-albuterol (DUO-NEB) nebulizer solution 3 mL (has no administration in time range)   lidocaine 1% - EPINEPHrine 1:[id number] (XYLOCAINE W/EPI) 1 %-1:253220 injection 10 mL (10 mL Injection Given by Other 7/25/25 1545)       Imaging results:  CT Head Without Contrast  Result Date: 7/25/2025   1. Scattered subarachnoid hemorrhage within the sulci and along the surface of each frontal lobe and the sulci of the anterior left parietal lobe. 2. No acute fracture of the cervical spine. 3. Patchy periventricular and subcortical white matter low-attenuation statistically representing age-indeterminate small vessel ischemic changes. 4. Mild extracranial frontoparietal scalp soft tissue thickening at the cranial vertex. 5. Multilevel cervical spondylosis and spondylolisthesis, as detailed above  Results 1 and 2 were discussed over the phone with the ordering ER physician, Dr. Alexis Valentino, at 3:35 p.m. on 7/25/2025.  This report was finalized on 7/25/2025 3:39 PM by Kj Cleaning MD on Workstation: [id number]      CT Cervical Spine Without Contrast  Result Date: 7/25/2025   1. Scattered subarachnoid hemorrhage within the sulci and along the surface of each frontal lobe and the sulci of the anterior left parietal lobe. 2. No acute fracture of the cervical spine. 3. Patchy periventricular and subcortical white matter low-attenuation statistically representing age-indeterminate small vessel ischemic changes. 4. Mild extracranial frontoparietal scalp soft tissue thickening at the cranial vertex. 5. Multilevel cervical spondylosis and spondylolisthesis, as detailed above  Results 1 and 2 were discussed over the phone with the ordering  ER physician, Dr. Alexis Valentino, at 3:35 p.m. on 7/25/2025.  This report was finalized on 7/25/2025 3:39 PM by Kj Cleaning MD on Workstation: [id number]        Ambulatory status:   - bedrest    Social issues:   Social History     Socioeconomic History    Marital status: Single   Tobacco Use    Smoking status: Every Day     Current packs/day: 0.25     Average packs/day: 0.3 packs/day for 30.0 years (7.5 ttl pk-yrs)     Types: Cigarettes    Smokeless tobacco: Never   Vaping Use    Vaping status: Never Used   Substance and Sexual Activity    Alcohol use: Yes     Comment: SOCIALLY    Drug use: No    Sexual activity: Defer       Peripheral Neurovascular  Peripheral Neurovascular (Adult)  Peripheral Neurovascular WDL: .WDL except, neurovascular assessment lower  LLE Neurovascular Assessment  Temperature LLE: warm  Color LLE: red    Neuro Cognitive  Neuro Cognitive (Adult)  Cognitive/Neuro/Behavioral WDL: .WDL except, orientation  Orientation: disoriented to, place, time, situation    Learning  Learning Assessment  Learning Readiness and Ability: cognitive limitation noted    Respiratory  Respiratory  Airway WDL: WDL  Respiratory WDL  Respiratory WDL: WDL    Abdominal Pain       Pain Assessments  Pain (Adult)  (0-10) Pain Rating: Rest: 0 (pt has no complaints at this time)  (0-10) Pain Rating: Activity: 0    NIH Stroke Scale       Ricardo Myrick RN  07/25/25 16:32 EDT

## 2025-07-25 NOTE — CONSULTS
[facility] NEUROSURGERY CONSULT NOTE    Patient name: Devin Scales  Referring Provider: Dr. Valentino  Reason for Consultation: [facility]    Patient Care Team:  Meron Starks DO as PCP - General (Family Medicine)  Herminio Humphrey MD as Consulting Physician (Cardiology)    Chief complaint: falls, head injury    Subjective .     History of present illness:    Patient is a 84 y.o.  male brought to ER due to multiple recent falls with closed head injury.  He was reportedly Recently seen at urgent care for head injury and advised to come to the ER but declined to go. I do not see any information in Care Everywhere regarding this visit.  Per son at bedside, patient lives alone. He was found on the floor again today by a friend whom contacted him.  Unwitnessed fall.  Unknown time down.  Unknown LOC. Patient does not recall why he fell.  He currently denies headache nausea vomiting visual changes. He is a poor historian.  Family unable to tell me anything about his medications.  Chart indicates he is on a baby aspirin but uncertain about the compliance.  Patient seen by our service back in December 2022 for Fall with traumatic subarachnoid hemorrhage while on reportedly prescribed but not taking aspirin and Plavix.  Was seen in follow-up in the office with complaints of clear watery nasal drainage.  Although he had 2 visits in the office with no obvious leak even during stressed testing.  Beta-2 transferrin testing of rhinorrhea was negative.  Recent PCP notes indicates patient was infested with bedbugs at office visit.  Several chart calls regarding caregiver concerns about patient condition deteriorating and lack of support.    No documented cancer history.  No history of stroke.  Does have a history of alcohol dependence.  Daily smoker    Review of Systems  Review of Systems   Eyes:  Negative for visual disturbance.   Cardiovascular:  Positive for leg swelling.   Gastrointestinal:  Negative for nausea and vomiting.    Genitourinary:  Positive for enuresis.   Musculoskeletal:  Positive for gait problem. Negative for neck pain.   Skin:  Positive for wound.   Neurological:  Negative for weakness, numbness and headaches.   Psychiatric/Behavioral:  Positive for confusion.        History  PAST MEDICAL HISTORY  Past Medical History:   Diagnosis Date    Benign essential tremor     BPH (benign prostatic hyperplasia)     Chest pain     Dyspnea     Fatigue     Hyperlipidemia        PAST SURGICAL HISTORY  Past Surgical History:   Procedure Laterality Date    CARDIAC CATHETERIZATION N/A 2/13/2018    Procedure: Left Heart Cath;  Surgeon: Emelia Arellano MD;  Location: [facility] CATH INVASIVE LOCATION;  Service:     CARDIAC CATHETERIZATION N/A 2/13/2018    Procedure: Coronary angiography;  Surgeon: Emelia Arellano MD;  Location: [facility] CATH INVASIVE LOCATION;  Service:     CARDIAC CATHETERIZATION N/A 2/13/2018    Procedure: Left ventriculography;  Surgeon: Emelia Arellano MD;  Location: [facility] CATH INVASIVE LOCATION;  Service:        FAMILY HISTORY  No family history on file.    SOCIAL HISTORY  Social History     Tobacco Use    Smoking status: Every Day     Current packs/day: 0.25     Average packs/day: 0.3 packs/day for 30.0 years (7.5 ttl pk-yrs)     Types: Cigarettes    Smokeless tobacco: Never   Vaping Use    Vaping status: Never Used   Substance Use Topics    Alcohol use: Yes     Comment: SOCIALLY    Drug use: No     single  retired  Lives alone    Allergies:  Patient has no known allergies.    MEDICATIONS:  Medications Prior to Admission   Medication Sig Dispense Refill Last Dose/Taking    aspirin 81 MG EC tablet Take 1 tablet by mouth Daily. 30 tablet 0     atorvastatin (LIPITOR) 80 MG tablet Take 1 tablet by mouth Daily.       Budeson-Glycopyrrol-Formoterol (Breztri Aerosphere) 160-9-4.8 MCG/ACT aerosol inhaler Inhale 2 puffs 2 (Two) Times a Day.       lisinopril (PRINIVIL,ZESTRIL) 10 MG tablet Take 1 tablet by mouth Daily. 30 tablet 0      pantoprazole (PROTONIX) 40 MG EC tablet Take 1 tablet by mouth 2 (Two) Times a Day Before Meals. 60 tablet 0     propranolol XL (INNOPRAN XL) 80 MG 24 hr capsule 1 capsule Daily.       tamsulosin (FLOMAX) 0.4 MG capsule 24 hr capsule Take 1 capsule by mouth Daily.            Current Facility-Administered Medications:     ceFAZolin 2000 mg IVPB in 100 mL NS (MBP), 2,000 mg, Intravenous, Once, Geovany Olsen MD, 2,000 mg at 07/25/25 1657    hydrALAZINE (APRESOLINE) injection 10 mg, 10 mg, Intravenous, PRN, Geovany Olsen MD, 10 mg at 07/25/25 1701    ipratropium-albuterol (DUO-NEB) nebulizer solution 3 mL, 3 mL, Nebulization, Q6H While Awake - RT, Geovany Olsen MD    Pharmacy To Dose: Cefazolin, , Not Applicable, Continuous PRN, Geovany Olsen MD      Objective     Results Review:  LABS:  Results from last 7 days   Lab Units 07/25/25  1435   WBC 10*3/mm3 10.10   HEMOGLOBIN g/dL 10.5*   HEMATOCRIT % 31.5*   PLATELETS 10*3/mm3 286     Results from last 7 days   Lab Units 07/25/25  1435   SODIUM mmol/L 141   POTASSIUM mmol/L 5.0   CHLORIDE mmol/L 104   CO2 mmol/L 26.0   BUN mg/dL 34.0*   CREATININE mg/dL 3.05*   CALCIUM mg/dL 9.2   BILIRUBIN mg/dL 0.5   ALK PHOS U/L 64   ALT (SGPT) U/L 19   AST (SGOT) U/L 23   GLUCOSE mg/dL 97     Urinalysis-leuk esterase, 11-20 WBC otherwise unremarkable  Urine drug screen negative  Fentanyl urine-negative  Ethanol level less than 10  CK1 72  proBNP 579  High-sensitivity troponin 59 (prior 53)    DIAGNOSTICS:  CT head scattered areas of subarachnoid hemorrhage send in the bilateral frontal lobes and anterior left parietal lobe..  There is scalp hematoma.  No evidence of skull fracture.    Cervical CT-no evidence of fracture.  Mild to moderate canal stenosis at several levels.  Degenerative changes.    Results Review:   I reviewed the patient's new clinical results.  I personally viewed and interpreted the patient's CTs.    Vital Signs   Temp:  [98.2 °F (36.8 °C)]  98.2 °F (36.8 °C)  Heart Rate:  [72-84] 72  Resp:  [20-21] 20  BP: (149-153)/(66-78) 149/66    Physical Exam:  Physical Exam  Vitals reviewed.   Constitutional:       General: He is awake.   HENT:      Head:      Comments: Laceration to vertex of scalp.  Sutured laceration above right eyebrow  Eyes:      General: Lids are normal.      Extraocular Movements: Extraocular movements intact.   Pulmonary:      Effort: Pulmonary effort is normal.   Musculoskeletal:      Cervical back: Normal range of motion and neck supple. No rigidity or bony tenderness. No pain with movement. Normal range of motion.      Left lower leg: Edema present.   Skin:     General: Skin is warm and dry.      Findings: Erythema (left ankle, calf, foot; blister top of left foot) present.   Neurological:      Mental Status: He is alert.      Motor: Motor strength is normal.  Psychiatric:         Attention and Perception: Attention normal.         Mood and Affect: Mood normal.         Behavior: Behavior is cooperative.         Cognition and Memory: Cognition is impaired.         Judgment: Judgment is inappropriate.      Comments: Speech hypophonic       Neurological Exam  Mental Status  Awake and alert. Oriented only to person.    Cranial Nerves  CN III, IV, VI: Extraocular movements intact bilaterally. Normal lids and orbits bilaterally.   Right pupil: 3 mm. Round. Reactive to light.   Left pupil: 3 mm. Round. Reactive to light.  CN V:  Right: Facial sensation is normal. Jaw strength is normal.  Left: Facial sensation is normal on the left. Jaw strength is normal.  CN VII:  Right: There is no facial weakness.  Left: There is no facial weakness.  CN IX, X: Palate elevates symmetrically  CN XII: Tongue midline without atrophy or fasciculations.    Motor  Normal muscle bulk throughout. No fasciculations present. Normal muscle tone. No abnormal involuntary movements. Strength is 5/5 throughout all four extremities.    Coordination  Right: Finger-to-nose  normal.    Gait    Not tested due to fall risk.      Assessment & Plan       Subarachnoid hematoma    SAH (subarachnoid hemorrhage)      Problem List Items Addressed This Visit          Unprioritized    SAH (subarachnoid hemorrhage) - Primary     Other Visit Diagnoses         STEVENSON (acute kidney injury)          Laceration of forehead, initial encounter                 COMORBID CONDITIONS:  COPD and peripheral vascular disease, history of subarachnoid hemorrhage from trauma, Social stressors, hx alcohol dependence    Patient presents with multiple recent falls.  He has a history of traumatic subarachnoid hemorrhage.  His imaging reveals acute scattered areas of subarachnoid hemorrhage on CT scan.  No cervical fractures.  He has disheveled appearance and possible cellulitis in the left lower extremity.  His son admits that his living situation is not ideal.  He lives by himself but he is a little overwhelmed because the son has to care for his mother as well.  Patient reportedly had recent bedbug infestation which he is working on.  Patient has alert but only oriented to self.  Cannot tell me month, day of week, city or what hospital he is send.  Cannot tell me what his last meal was.  He denies headache.  He moves all extremities with no focal drift or weakness.  No facial asymmetry.  Speech is hypophonic but he does answer questions although he appears confused.  Uncertain whether he is on any blood thinners but aspirin is documented in his chart although he was reportedly not compliant with aspirin and Plavix in the past.  Coags pending.  Will plan for repeat CT head in AM.  CCP consulted to assist with social issues.    PLAN:   CTH AM  Neuro checks q 1 hr  SBP <140  CCP for assessment of living situation    I discussed the patient's findings and my recommendations with patient, family, nursing staff, and Dr. Valentino and Dr. Joiner    During patient visit, I utilized appropriate personal protective equipment  "including gloves. Appropriate PPE was worn during the entire visit.  Hand hygiene was completed before and after.     Luciana Porter, SONIYA  07/25/25  17:21 EDT    \"Dictated utilizing Dragon dictation\".      "

## 2025-07-25 NOTE — Clinical Note
Diagnosis: Subarachnoid hematoma [[id number]]   Certification: I Certify That Inpatient Hospital Services Are Medically Necessary For Greater Than 2 Midnights

## 2025-07-25 NOTE — ED PROVIDER NOTES
Laceration Repair    Date/Time: 7/25/2025 4:25 PM    Performed by: Brian Self PA  Authorized by: Alexis Valentino MD    Universal protocol:     Patient identity confirmed:  Hospital-assigned identification number and arm band  Anesthesia:     Anesthesia method:  Local infiltration    Local anesthetic:  Lidocaine 1% WITH epi  Laceration details:     Location:  Face    Face location:  R eyebrow    Length (cm):  2.8  Pre-procedure details:     Preparation:  Patient was prepped and draped in usual sterile fashion and imaging obtained to evaluate for foreign bodies  Exploration:     Hemostasis achieved with:  Epinephrine    Wound extent: no foreign bodies/material noted and no underlying fracture noted    Treatment:     Area cleansed with:  Chlorhexidine    Amount of cleaning:  Standard    Irrigation solution:  Sterile saline    Debridement:  Minimal  Skin repair:     Repair method:  Sutures    Suture size:  6-0    Suture material:  Nylon    Suture technique:  Running    Number of sutures:  6  Approximation:     Approximation:  Close  Repair type:     Repair type:  Simple  Post-procedure details:     Dressing:  Antibiotic ointment    Procedure completion:  Tolerated well, no immediate complications         Brian Self PA  07/25/25 2312

## 2025-07-25 NOTE — ED TRIAGE NOTES
Pt to ED from home, neighbor went to check on pt and found him on the ground, pt had a fall two days ago, evaluated at  advised to come to ed however pt refused.     Pt has a new lac about the R eye, ems noted some swelling and a blister on the L leg, however pt has no pain or concerns at this time.     Pt is alert to self and place but not able to give this RN details about fall or the fall two days ago. Unsure if confusion is pts baseline.    Pt is a falls risk, fall band applied.

## 2025-07-25 NOTE — H&P
[place] [facility]  HISTORY AND PHYSICAL   [facility]        Patient Identification:  Name: Devin Scales  Age: 84 y.o.  Sex: male  :  1940  MRN: [id number]                     Primary Care Physician: Meron Starks DO    Chief Complaint:     Subarachnoid hemorrhage    History of Present Illness:   84-year-old male with a history of COPD, hypertension, hyperlipidemia, gastroesophageal reflux disease who presents to the hospital after fall.    Patient was found down by his neighbor on the floor and was brought in by EMS.  Also had a fall approximately 2 days ago and was advised to come to the emergency department however refused at that time.  Evidence of trauma to the face and found to have a subarachnoid hemorrhage on CT of the head.  Denies any headache, fevers, chills, shortness of breath, chest pain, decreased appetite.  Lives at home alone.  Son is at bedside.  Does have a history of COPD for which he is on Breztri.  Current smoker.    Past Medical History:  Past Medical History:   Diagnosis Date    Benign essential tremor     BPH (benign prostatic hyperplasia)     Chest pain     Dyspnea     Fatigue     Hyperlipidemia      Past Surgical History:  Past Surgical History:   Procedure Laterality Date    CARDIAC CATHETERIZATION N/A 2018    Procedure: Left Heart Cath;  Surgeon: Emelia Arellano MD;  Location: [facility] CATH INVASIVE LOCATION;  Service:     CARDIAC CATHETERIZATION N/A 2018    Procedure: Coronary angiography;  Surgeon: Emelia Arellano MD;  Location: [facility] CATH INVASIVE LOCATION;  Service:     CARDIAC CATHETERIZATION N/A 2018    Procedure: Left ventriculography;  Surgeon: Emelia Arellano MD;  Location: [facility] INVASIVE LOCATION;  Service:       Home Meds:  (Not in a hospital admission)      Allergies:  No Known Allergies  Immunizations:  Immunization History   Administered Date(s) Administered    COVID-19 (PFIZER) Purple Cap Monovalent 2021,  2021     Social History:   Social History     Social History Narrative    Not on file     Social History     Tobacco Use    Smoking status: Every Day     Current packs/day: 0.25     Average packs/day: 0.3 packs/day for 30.0 years (7.5 ttl pk-yrs)     Types: Cigarettes    Smokeless tobacco: Never   Substance Use Topics    Alcohol use: Yes     Comment: SOCIALLY     Family History:  No family history on file.     Review of Systems  12 point review of systems negative except as per HPI above    Objective:  tMax 24 hrs: Temp (24hrs), Av.2 °F (36.8 °C), Min:98.2 °F (36.8 °C), Max:98.2 °F (36.8 °C)    Vitals Ranges:   Temp:  [98.2 °F (36.8 °C)] 98.2 °F (36.8 °C)  Heart Rate:  [72] 72  Resp:  [21] 21  BP: (153)/(75) 153/75      Exam:  /75   Pulse 72   Temp 98.2 °F (36.8 °C) (Oral)   Resp 21   SpO2 98%     General: Alert, nontoxic, NAD  HEENT: Facial trauma with lacerations, EOMI, MMM  Neck: Supple, trachea midline  Cardiac: RRR, no murmur, gallops, rubs  Pulmonary: Clear to auscultation bilaterally, no adventitious breath sounds, normal respiratory effort  GI: Soft, non-tender, non-distended, normal bowel sounds  Extremities: Warm, well perfused, 2+ LE edema, left lower leg cellulitis  Skin: no visible rash  Neuro: CN II - XII grossly intact  Psychiatry: Normal mood and affect      Data Review:    Labs:  Results from last 7 days   Lab Units 25  1435   WBC 10*3/mm3 10.10   HEMOGLOBIN g/dL 10.5*   PLATELETS 10*3/mm3 286     Results from last 7 days   Lab Units 25  1435   SODIUM mmol/L 141   POTASSIUM mmol/L 5.0   CHLORIDE mmol/L 104   CO2 mmol/L 26.0   BUN mg/dL 34.0*   CREATININE mg/dL 3.05*   GLUCOSE mg/dL 97   CALCIUM mg/dL 9.2   CrCl cannot be calculated (Unknown ideal weight.).    Results from last 7 days   Lab Units 25  1435   AST (SGOT) U/L 23   ALT (SGPT) U/L 19   PLATELETS 10*3/mm3 286             Result Review:  I have personally reviewed the results from the time of this  admission to 7/25/2025 16:25 EDT and agree with these findings:  [x]  Laboratory list / accordion  [x]  Microbiology  [x]  Radiology  []  EKG/Telemetry   [x]  Cardiology/Vascular   []  Pathology  [x]  Old records  []  Other:    Assessment / Plan:    Subarachnoid hemorrhage  Left lower extremity cellulitis  Facial trauma  Falls  Acute kidney injury  Chronic obstructive pulmonary disease  Anemia  Troponin elevation  Lower extremity edema  Tobacco abuse    -Patient presented to the hospital after being found down by his neighbor for falls  -Found to have subarachnoid hemorrhage and facial trauma including lacerations and skin tears  - Neurosurgery consulted  - Repeat CT head tomorrow morning  -Bilateral lower extremity edema, will obtain echocardiogram for cardiac evaluation  -Left lower extremity with cellulitis, cefazolin therapy started  - Blood pressure control  - On Breztri at home, [place] in hospital  - Patient with frequent falls, weak appearing, will need PT/OT evaluation and likely rehab placement.  - Discussed with patient and son at bedside    GI prophylaxis: Not indicated  DVT prophylaxis: SCDs  [place] catheter: No  Bowel regimen: Ordered  Nutrition: N.p.o.    Disposition: ICU due to critical state    Geovany Olsen MD  [facility], [facility]  Pulmonary and Critical Care Medicine, Interventional Pulmonology    7/25/2025  16:13 EDT

## 2025-07-25 NOTE — PLAN OF CARE
Goal Outcome Evaluation:  Plan of Care Reviewed With: family, patient        Progress: no change   Admitted to ICU from ED following fall at home. Patient is AO only to self, PERRLA, follows commands. Q1 Neuro checks per neuro surgery, CT head in AM. Will continue to monitor.

## 2025-07-25 NOTE — ED PROVIDER NOTES
EMERGENCY DEPARTMENT ENCOUNTER    Room Number:  I380/1  PCP: Meron Starks DO  Historian: EMS      HPI:  Chief Complaint: Multiple falls  A complete HPI/ROS/PMH/PSH/SH/FH are unobtainable due to: None  Context: Devin Scales is a 84 y.o. male who presents to the ED c/o multiple falls.  Patient with history of cognitive disorder but does live alone.  Patient apparently has been falling multiple times.  Patient did hit head few days ago was seen at urgent care told to come the emergency department but did not.  Patient was found on the floor today.  Patient has new laceration.  Unclear when he fell.  Patient states he does not remember falling.  Having no chest pain abdominal pain.  Has had no fevers or chills.            PAST MEDICAL HISTORY  Active Ambulatory Problems     Diagnosis Date Noted    Subarachnoid hemorrhage following injury, no loss of consciousness, initial encounter 12/25/2022    COPD (chronic obstructive pulmonary disease) 12/26/2022    Cigarette smoker 12/26/2022    Alcohol dependence 12/26/2022    Peripheral vascular disease 12/26/2022    CSF leak 03/15/2023    Acute kidney injury 05/31/2023     Resolved Ambulatory Problems     Diagnosis Date Noted    No Resolved Ambulatory Problems     Past Medical History:   Diagnosis Date    Benign essential tremor     BPH (benign prostatic hyperplasia)     Chest pain     Dyspnea     Fatigue     Hyperlipidemia          PAST SURGICAL HISTORY  Past Surgical History:   Procedure Laterality Date    CARDIAC CATHETERIZATION N/A 2/13/2018    Procedure: Left Heart Cath;  Surgeon: Emelia Arellano MD;  Location: [facility] INVASIVE LOCATION;  Service:     CARDIAC CATHETERIZATION N/A 2/13/2018    Procedure: Coronary angiography;  Surgeon: Emelia Arellano MD;  Location: [facility] INVASIVE LOCATION;  Service:     CARDIAC CATHETERIZATION N/A 2/13/2018    Procedure: Left ventriculography;  Surgeon: Emelia Arellano MD;  Location: [facility] CATH INVASIVE LOCATION;  Service:           FAMILY HISTORY  No family history on file.      SOCIAL HISTORY  Social History     Socioeconomic History    Marital status: Single   Tobacco Use    Smoking status: Every Day     Current packs/day: 0.25     Average packs/day: 0.3 packs/day for 30.0 years (7.5 ttl pk-yrs)     Types: Cigarettes    Smokeless tobacco: Never   Vaping Use    Vaping status: Never Used   Substance and Sexual Activity    Alcohol use: Yes     Comment: SOCIALLY    Drug use: No    Sexual activity: Defer         ALLERGIES  Patient has no known allergies.        REVIEW OF SYSTEMS  Review of Systems   Head injury      PHYSICAL EXAM  ED Triage Vitals   Temp Heart Rate Resp BP SpO2   07/25/25 1405 07/25/25 1403 07/25/25 1403 07/25/25 1403 07/25/25 1403   98.2 °F (36.8 °C) 72 21 153/75 98 %      Temp src Heart Rate Source Patient Position BP Location FiO2 (%)   07/25/25 1405 07/25/25 1403 -- -- --   Oral Monitor          Physical Exam      GENERAL: no acute distress  HENT: nares patent  EYES: no scleral icterus  CV: regular rhythm, normal rate  RESPIRATORY: normal effort, lungs clear  ABDOMEN: soft.  Distended suprapubic area  MUSCULOSKELETAL: no deformity  NEURO: alert, moves all extremities, follows commands  PSYCH:  calm, cooperative  SKIN: warm, dry.  Laceration right forehead.  Skin tear right elbow    Vital signs and nursing notes reviewed.          LAB RESULTS  Recent Results (from the past 24 hours)   Urinalysis With Microscopic If Indicated (No Culture) - Urine, Clean Catch    Collection Time: 07/25/25  2:26 PM    Specimen: Urine, Clean Catch   Result Value Ref Range    Color, UA Yellow Yellow, Straw    Appearance, UA Clear Clear    pH, UA 5.5 5.0 - 8.0    Specific Gravity, UA 1.010 1.005 - 1.030    Glucose, UA Negative Negative    Ketones, UA Negative Negative    Bilirubin, UA Negative Negative    Blood, UA Negative Negative    Protein, UA Negative Negative    Leuk Esterase, UA Small (1+) (A) Negative    Nitrite, UA Negative Negative     Urobilinogen, UA 0.2 E.U./dL 0.2 - 1.0 E.U./dL   Urine Drug Screen - Urine, Clean Catch    Collection Time: 07/25/25  2:26 PM    Specimen: Urine, Clean Catch   Result Value Ref Range    THC, Screen, Urine Negative Negative    Phencyclidine (PCP), Urine Negative Negative    Cocaine Screen, Urine Negative Negative    Methamphetamine, Ur Negative Negative    Opiate Screen Negative Negative    Amphetamine Screen, Urine Negative Negative    Benzodiazepine Screen, Urine Negative Negative    Tricyclic Antidepressants Screen Negative Negative    Methadone Screen, Urine Negative Negative    Barbiturates Screen, Urine Negative Negative    Oxycodone Screen, Urine Negative Negative    Buprenorphine, Screen, Urine Negative Negative   Fentanyl, Urine - Urine, Clean Catch    Collection Time: 07/25/25  2:26 PM    Specimen: Urine, Clean Catch   Result Value Ref Range    Fentanyl, Urine Negative Negative   Urinalysis, Microscopic Only - Urine, Clean Catch    Collection Time: 07/25/25  2:26 PM    Specimen: Urine, Clean Catch   Result Value Ref Range    RBC, UA 0-2 None Seen, 0-2 /HPF    WBC, UA 11-20 (A) None Seen, 0-2 /HPF    Bacteria, UA None Seen None Seen /HPF    Squamous Epithelial Cells, UA 0-2 None Seen, 0-2 /HPF    Hyaline Casts, UA 0-2 None Seen /LPF    Methodology Automated Microscopy    Comprehensive Metabolic Panel    Collection Time: 07/25/25  2:35 PM    Specimen: Blood   Result Value Ref Range    Glucose 97 65 - 99 mg/dL    BUN 34.0 (H) 8.0 - 23.0 mg/dL    Creatinine 3.05 (H) 0.76 - 1.27 mg/dL    Sodium 141 136 - 145 mmol/L    Potassium 5.0 3.5 - 5.2 mmol/L    Chloride 104 98 - 107 mmol/L    CO2 26.0 22.0 - 29.0 mmol/L    Calcium 9.2 8.6 - 10.5 mg/dL    Total Protein 6.5 6.0 - 8.5 g/dL    Albumin 3.4 (L) 3.5 - 5.2 g/dL    ALT (SGPT) 19 1 - 41 U/L    AST (SGOT) 23 1 - 40 U/L    Alkaline Phosphatase 64 39 - 117 U/L    Total Bilirubin 0.5 0.0 - 1.2 mg/dL    Globulin 3.1 gm/dL    A/G Ratio 1.1 g/dL    BUN/Creatinine Ratio  11.1 7.0 - 25.0    Anion Gap 11.0 5.0 - 15.0 mmol/L    eGFR 19.7 (L) >60.0 mL/min/1.73   BNP    Collection Time: 07/25/25  2:35 PM    Specimen: Blood   Result Value Ref Range    proBNP 579.0 0.0 - 1,800.0 pg/mL   High Sensitivity Troponin T    Collection Time: 07/25/25  2:35 PM    Specimen: Blood   Result Value Ref Range    HS Troponin T 53 (C) <22 ng/L   CBC Auto Differential    Collection Time: 07/25/25  2:35 PM    Specimen: Blood   Result Value Ref Range    WBC 10.10 3.40 - 10.80 10*3/mm3    RBC 3.23 (L) 4.14 - 5.80 10*6/mm3    Hemoglobin 10.5 (L) 13.0 - 17.7 g/dL    Hematocrit 31.5 (L) 37.5 - 51.0 %    MCV 97.5 (H) 79.0 - 97.0 fL    MCH 32.5 26.6 - 33.0 pg    MCHC 33.3 31.5 - 35.7 g/dL    RDW 12.1 (L) 12.3 - 15.4 %    RDW-SD 42.7 37.0 - 54.0 fl    MPV 9.3 6.0 - 12.0 fL    Platelets 286 140 - 450 10*3/mm3    Neutrophil % 79.0 (H) 42.7 - 76.0 %    Lymphocyte % 12.4 (L) 19.6 - 45.3 %    Monocyte % 6.4 5.0 - 12.0 %    Eosinophil % 1.3 0.3 - 6.2 %    Basophil % 0.5 0.0 - 1.5 %    Immature Grans % 0.4 0.0 - 0.5 %    Neutrophils, Absolute 7.98 (H) 1.70 - 7.00 10*3/mm3    Lymphocytes, Absolute 1.25 0.70 - 3.10 10*3/mm3    Monocytes, Absolute 0.65 0.10 - 0.90 10*3/mm3    Eosinophils, Absolute 0.13 0.00 - 0.40 10*3/mm3    Basophils, Absolute 0.05 0.00 - 0.20 10*3/mm3    Immature Grans, Absolute 0.04 0.00 - 0.05 10*3/mm3    nRBC 0.0 0.0 - 0.2 /100 WBC   Ethanol    Collection Time: 07/25/25  2:35 PM    Specimen: Blood   Result Value Ref Range    Ethanol <10 0 - 10 mg/dL    Ethanol % <0.010 %   CK    Collection Time: 07/25/25  2:35 PM    Specimen: Blood   Result Value Ref Range    Creatine Kinase 172 20 - 200 U/L   ECG 12 Lead Altered Mental Status    Collection Time: 07/25/25  2:36 PM   Result Value Ref Range    QT Interval 388 ms    QTC Interval 422 ms   High Sensitivity Troponin T 1Hr    Collection Time: 07/25/25  3:41 PM    Specimen: Arm, Right; Blood   Result Value Ref Range    HS Troponin T 59 (C) <22 ng/L    Troponin  T Numeric Delta 6 ng/L    Troponin T % Delta 11 Abnormal if >/= 20%   POC Glucose Once    Collection Time: 07/25/25  5:32 PM    Specimen: Blood   Result Value Ref Range    Glucose 76 70 - 130 mg/dL   ECG 12 Lead Rhythm Change    Collection Time: 07/25/25  5:54 PM   Result Value Ref Range    QT Interval 379 ms    QTC Interval 435 ms       Ordered the above labs and reviewed the results.        RADIOLOGY  CT Head Without Contrast  CT Head Without Contrast, CT Cervical Spine Without Contrast  Result Date: 7/25/2025  CT HEAD WO CONTRAST-, CT CERVICAL SPINE WO CONTRAST-  DATE OF EXAM: 7/25/2025 2:38 PM  INDICATION: Right-sided head injury and neck injury status post fall.  COMPARISON: CT paranasal sinuses 4/14/2023. CT head 3/14/2023, 1/12/2023, and 12/26/2022.  TECHNIQUE: Multiple contiguous axial images were acquired through the head and cervical spine without the intravenous administration of contrast. Reformatted coronal and sagittal sequences were also reviewed. Radiation dose reduction techniques were utilized, including automated exposure control and exposure modulation based on body size.  FINDINGS: CT HEAD: Scattered subarachnoid hemorrhage in the sulci and along the surface of each frontal lobe and in the sulci of the anterior left parietal lobe. No intracranial mass/mass effect. The ventricles and sulci are otherwise stable and appropriate for age. The basilar cisterns are patent. Patchy periventricular and subcortical white matter low-attenuation, statistically representing age-indeterminate small vessel ischemic changes. Gray-white matter differentiation is grossly maintained. Likely benign bilateral basal ganglia calcifications. Mild to moderate mucosal thickening in the inferior frontal sinuses. Limited imaging of the orbits is unremarkable. Chronic partial opacification of the left mastoid air cells, possible simple effusion versus simple mastoiditis. Partial opacification of each external auditory canal  likely represents cerumen. Mild extracranial frontoparietal scalp soft tissue swelling at the cranial vertex. No acute osseous abnormality is identified.  CT CERVICAL SPINE: Mild likely chronic/degenerative grade 1 retrolisthesis of C3 on C4 and C6 on C7. Mild multilevel degenerative endplate changes. The cervical vertebral bodies otherwise demonstrate well preserved height and alignment. No evidence of acute fracture of the cervical spine. Stable sclerosis of the dens, probable benign bone island. No concerning osseous lesion. Severe arthropathic changes at the anterior median atlantoaxial joint.  C2-C3: Left facet hypertrophy, resulting in mild left neural foraminal narrowing. No significant spinal canal stenosis or right neural foraminal narrowing. C3-C4: Mild grade 1 retrolisthesis with a small broad-based posterior disc osteophyte complex and bilateral facet and uncinate process hypertrophy. Findings result in mild to moderate spinal canal stenosis (AP canal diameter 8 mm) and moderate to severe bilateral neural foraminal narrowing. C4-C5: Broad-based posterior disc osteophyte complex with right greater than left facet hypertrophy and mild right uncinate process hypertrophy. Findings result in mild spinal canal stenosis (AP canal diameter 9 mm) and moderate to severe right neural foraminal narrowing. No significant left neural foraminal narrowing. C5-C6: Broad-based posterior disc osteophyte complex, resulting in mild spinal canal stenosis (AP canal diameter 9 mm). No significant neural foraminal narrowing. C6-C7: Mild grade 1 retrolisthesis with a broad-based posterior disc osteophyte complex and bilateral uncinate process hypertrophy. Findings result in mild to moderate spinal canal stenosis (AP canal diameter 8 mm) and moderate to severe bilateral neural foraminal narrowing. C7-T1: Right facet hypertrophy, without significant spinal canal stenosis or neural foraminal narrowing.  The paraspinal soft tissues  are unremarkable. Calcified atherosclerotic disease proximal ICA. Partially imaged chronic emphysematous changes and partially calcified pleural-parenchymal scarring in each lung apex.       1. Scattered subarachnoid hemorrhage within the sulci and along the surface of each frontal lobe and the sulci of the anterior left parietal lobe. 2. No acute fracture of the cervical spine. 3. Patchy periventricular and subcortical white matter low-attenuation statistically representing age-indeterminate small vessel ischemic changes. 4. Mild extracranial frontoparietal scalp soft tissue thickening at the cranial vertex. 5. Multilevel cervical spondylosis and spondylolisthesis, as detailed above  Results 1 and 2 were discussed over the phone with the ordering ER physician, Dr. Alexis Valenitno, at 3:35 p.m. on 7/25/2025.  This report was finalized on 7/25/2025 3:39 PM by Kj Cleaning MD on Workstation: [id number]      XR Chest 1 View  Result Date: 7/25/2025  XR CHEST 1 VW-  Clinical: Acute mental status change  COMPARISON 5/31/2023  FINDINGS: Cardiac size within normal limits, no mediastinal or hilar abnormality. Lungs appear hyperaerated and clear.  CONCLUSION: No acute cardiovascular or pulmonary process is demonstrated.  This report was finalized on 7/25/2025 3:31 PM by Dr. Pete Georges M.D on Workstation: [id number]        Ordered the above noted radiological studies.  Chest x-ray independent interpreted by me and shows no evidence of pneumothorax          PROCEDURES  Critical Care    Performed by: Alexis Valentino MD  Authorized by: Alexis Valentino MD    Critical care provider statement:     Critical care time (minutes):  35    Critical care time was exclusive of:  Separately billable procedures and treating other patients    Critical care was necessary to treat or prevent imminent or life-threatening deterioration of the following conditions:  CNS failure or compromise    Critical care was time spent  personally by me on the following activities:  Ordering and performing treatments and interventions, ordering and review of laboratory studies, development of treatment plan with patient or surrogate, discussions with primary provider, discussions with consultants, re-evaluation of patient's condition and pulse oximetry      EKG          EKG time: 1436  Rhythm/Rate: Normal sinus rhythm 71  P waves and UT: Normal P waves  QRS, axis: Normal QRS  ST and T waves: Very prominent T waves in V2    Interpreted Contemporaneously by me, independently viewed  Unchanged compared to prior 5/31/2023          MEDICATIONS GIVEN IN ER  Medications   nitroglycerin (NITROSTAT) SL tablet 0.4 mg (has no administration in time range)   mupirocin (BACTROBAN) 2 % nasal ointment 1 Application (1 Application Each Nare Not Given 7/25/25 1747)   aluminum-magnesium hydroxide-simethicone (MAALOX MAX) 400-400-40 MG/5ML suspension 15 mL (has no administration in time range)   sennosides-docusate (PERICOLACE) 8.6-50 MG per tablet 2 tablet (has no administration in time range)     And   polyethylene glycol (MIRALAX) packet 17 g (has no administration in time range)     And   bisacodyl (DULCOLAX) EC tablet 5 mg (has no administration in time range)     And   bisacodyl (DULCOLAX) suppository 10 mg (has no administration in time range)   acetaminophen (TYLENOL) tablet 650 mg (has no administration in time range)     Or   acetaminophen (TYLENOL) suppository 650 mg (has no administration in time range)   ondansetron ODT (ZOFRAN-ODT) disintegrating tablet 4 mg (has no administration in time range)     Or   ondansetron (ZOFRAN) injection 4 mg (has no administration in time range)   Potassium Replacement - Follow Nurse / BPA Driven Protocol (has no administration in time range)   Magnesium Standard Dose Replacement - Follow Nurse / BPA Driven Protocol (has no administration in time range)   Phosphorus Replacement - Follow Nurse / BPA Driven Protocol (has no  administration in time range)   Calcium Replacement - Follow Nurse / BPA Driven Protocol (has no administration in time range)   Pharmacy To Dose: Cefazolin (has no administration in time range)   ipratropium-albuterol (DUO-NEB) nebulizer solution 3 mL (has no administration in time range)   hydrALAZINE (APRESOLINE) injection 10 mg (10 mg Intravenous Given 7/25/25 1701)   ceFAZolin 2000 mg IVPB in 100 mL NS (MBP) (has no administration in time range)   lidocaine 1% - EPINEPHrine 1:085791 (XYLOCAINE W/EPI) 1 %-1:284537 injection 10 mL (10 mL Injection Given by Other 7/25/25 1545)   ceFAZolin 2000 mg IVPB in 100 mL NS (MBP) (2,000 mg Intravenous New Bag 7/25/25 1657)                   MEDICAL DECISION MAKING, PROGRESS, and CONSULTS    All labs have been independently reviewed by me.  All radiology studies have been reviewed by me and I have also reviewed the radiology report.   EKGs independently viewed and interpreted by me.  Discussion below represents my analysis of pertinent findings related to patient's condition, differential diagnosis, treatment plan and final disposition.      Additional sources:  - Discussed/ obtained information from independent historians: None    - External (non-ED) record review: Epic reviewed and seen 6/9/2025 by primary doctor for memory loss    - Chronic or social conditions impacting care: None    - Shared decision making: None      Orders placed during this visit:  Orders Placed This Encounter   Procedures    Laceration Repair    Critical Care    XR Chest 1 View    CT Head Without Contrast    CT Cervical Spine Without Contrast    CT Head Without Contrast    Comprehensive Metabolic Panel    Urinalysis With Microscopic If Indicated (No Culture) - Urine, Clean Catch    BNP    High Sensitivity Troponin T    CBC Auto Differential    Ethanol    Urine Drug Screen - Urine, Clean Catch    CK    Fentanyl, Urine - Urine, Clean Catch    Urinalysis, Microscopic Only - Urine, Clean Catch    High  Sensitivity Troponin T 1Hr    Protime-INR    aPTT    Phosphorus    Magnesium    Basic Metabolic Panel    NPO Diet NPO Type: Strict NPO    Vital Signs Per hospital policy    Telemetry - Place Orders & Notify Provider of Results When Patient Experiences Acute Chest Pain, Dysrhythmia or Respiratory Distress    Continuous Pulse Oximetry    Height and weight    Daily Weights    Intake and Output    Oral Care - Patient Not on NPPV & Not Intubated    Target Arousal Level RASS 0 to -1    If Patient has BG of < 80 and is symptomatic but not on an IV insulin protocol then use the Adult Hypoglycemia Treatment Orders.    Saline Lock    Place Order to Consult Intensivist For Critical Care Management (If Patient Not Admitted to Cardiology for Primary Cardiology Condition)    Use Mobility Guidelines for Advancement of Activity    Daily CHG Bath While in Critical Care    Place Sequential Compression Device    Maintain Sequential Compression Device    Neuro Checks    Keep SBP <140; intensivist to manage please  Physician Communication Order    Code Status and Medical Interventions: CPR (Attempt to Resuscitate); Full Support    Neurosurgery (on-call MD unless specified)    Pulmonology (on-call MD unless specified)    Inpatient Case Management  Consult    POC Glucose Q4H    POC Glucose Once    ECG 12 Lead Altered Mental Status    ECG 12 Lead Rhythm Change    Adult Transthoracic Echo Complete W/ Cont if Necessary Per Protocol    Inpatient Admission    Inpatient Admission    CBC & Differential    CBC & Differential         Additional orders considered but not ordered:  None        Differential diagnosis includes but is not limited to:    Subarachnoid hemorrhage versus subdural hematoma.  STEVENSON versus postobstructive renal failure      Independent interpretation of labs, radiology studies, and discussions with consultants:  ED Course as of 07/25/25 1903 Fri Jul 25, 2025   0984 16:24 EDT  Patient presents for evaluation of  fall.  Patient seems somewhat confused.  Patient has subarachnoid bleeding in his brain.  Patient also found to have creatinine of 3.  This may be because of postobstructive renal failure as patient had 800 cc in the bladder.  Patient's been discussed with neurosurgery as well as Dr. Olsen who will admit to ICU. [SL]   1706 17:06 EDT  Patient also appears to have some mild redness to the lower extremity.  Potentially early cellulitis [SL]      ED Course User Index  [SL] Alexis Valentino MD                 DIAGNOSIS  Final diagnoses:   SAH (subarachnoid hemorrhage)   STEVENSON (acute kidney injury)   Laceration of forehead, initial encounter         DISPOSITION  admit            Latest Documented Vital Signs:  As of 19:03 EDT  BP- 123/60 HR- 81 Temp- 97.6 °F (36.4 °C) (Oral) O2 sat- 100%              --    Please note that portions of this were completed with a voice recognition program.       Note Disclaimer: At [facility], we believe that sharing information builds trust and better relationships. You are receiving this note because you are receiving care at [facility] or recently visited. It is possible you will see health information before a provider has talked with you about it. This kind of information can be easy to misunderstand. To help you fully understand what it means for your health, we urge you to discuss this note with your provider.            Alexis Valentino MD  07/25/25 4049       Alexis Valentino MD  07/25/25 2880

## 2025-07-26 ENCOUNTER — APPOINTMENT (OUTPATIENT)
Dept: CARDIOLOGY | Facility: HOSPITAL | Age: 85
End: 2025-07-26
Payer: MEDICARE

## 2025-07-26 ENCOUNTER — APPOINTMENT (OUTPATIENT)
Dept: CT IMAGING | Facility: HOSPITAL | Age: 85
End: 2025-07-26
Payer: MEDICARE

## 2025-07-26 LAB
ANION GAP SERPL CALCULATED.3IONS-SCNC: 12 MMOL/L (ref 5–15)
AORTIC DIMENSIONLESS INDEX: 0.93 (DI)
APTT PPP: 27.5 SECONDS (ref 22.7–35.4)
AV MEAN PRESS GRAD SYS DOP V1V2: 2 MMHG
AV VMAX SYS DOP: 99.3 CM/SEC
BASOPHILS # BLD AUTO: 0.03 10*3/MM3 (ref 0–0.2)
BASOPHILS NFR BLD AUTO: 0.3 % (ref 0–1.5)
BH CV ECHO MEAS - AO MAX PG: 3.9 MMHG
BH CV ECHO MEAS - AO ROOT DIAM: 3.1 CM
BH CV ECHO MEAS - AO V2 VTI: 19.2 CM
BH CV ECHO MEAS - AVA(I,D): 2.5 CM2
BH CV ECHO MEAS - EDV(CUBED): 42.9 ML
BH CV ECHO MEAS - ESV(CUBED): 13.2 ML
BH CV ECHO MEAS - FS: 32.6 %
BH CV ECHO MEAS - IVS/LVPW: 1 CM
BH CV ECHO MEAS - IVSD: 1.1 CM
BH CV ECHO MEAS - LV MASS(C)D: 119 GRAMS
BH CV ECHO MEAS - LV MAX PG: 4 MMHG
BH CV ECHO MEAS - LV MEAN PG: 2 MMHG
BH CV ECHO MEAS - LV V1 MAX: 100 CM/SEC
BH CV ECHO MEAS - LV V1 VTI: 17.8 CM
BH CV ECHO MEAS - LVIDD: 3.5 CM
BH CV ECHO MEAS - LVIDS: 2.36 CM
BH CV ECHO MEAS - LVOT AREA: 2.7 CM2
BH CV ECHO MEAS - LVOT DIAM: 1.86 CM
BH CV ECHO MEAS - LVPWD: 1.1 CM
BH CV ECHO MEAS - MED PEAK E' VEL: 7.4 CM/SEC
BH CV ECHO MEAS - MV A MAX VEL: 87.5 CM/SEC
BH CV ECHO MEAS - MV DEC SLOPE: 650.7 CM/SEC2
BH CV ECHO MEAS - MV DEC TIME: 0.2 SEC
BH CV ECHO MEAS - MV E MAX VEL: 77.1 CM/SEC
BH CV ECHO MEAS - MV E/A: 0.88
BH CV ECHO MEAS - MV MAX PG: 4.5 MMHG
BH CV ECHO MEAS - MV MEAN PG: 2.14 MMHG
BH CV ECHO MEAS - MV P1/2T: 49.6 MSEC
BH CV ECHO MEAS - MV V2 VTI: 26.7 CM
BH CV ECHO MEAS - MVA(P1/2T): 4.4 CM2
BH CV ECHO MEAS - MVA(VTI): 1.82 CM2
BH CV ECHO MEAS - PA ACC TIME: 0.07 SEC
BH CV ECHO MEAS - PA V2 MAX: 91.2 CM/SEC
BH CV ECHO MEAS - QP/QS: 0.58
BH CV ECHO MEAS - RAP SYSTOLE: 8 MMHG
BH CV ECHO MEAS - RV MAX PG: 2.2 MMHG
BH CV ECHO MEAS - RV V1 MAX: 74.1 CM/SEC
BH CV ECHO MEAS - RV V1 VTI: 14.3 CM
BH CV ECHO MEAS - RVOT DIAM: 1.58 CM
BH CV ECHO MEAS - RVSP: 44 MMHG
BH CV ECHO MEAS - SV(LVOT): 48.5 ML
BH CV ECHO MEAS - SV(RVOT): 28 ML
BH CV ECHO MEAS - SVI(LVOT): 28.8 ML/M2
BH CV ECHO MEAS - TAPSE (>1.6): 3.2 CM
BH CV ECHO MEAS - TR MAX PG: 36 MMHG
BH CV ECHO MEAS - TR MAX VEL: 300.1 CM/SEC
BH CV XLRA - RV BASE: 3.9 CM
BH CV XLRA - RV LENGTH: 6.8 CM
BH CV XLRA - RV MID: 2.9 CM
BH CV XLRA - TDI S': 10.3 CM/SEC
BUN SERPL-MCNC: 34 MG/DL (ref 8–23)
BUN/CREAT SERPL: 11.6 (ref 7–25)
CALCIUM SPEC-SCNC: 8.9 MG/DL (ref 8.6–10.5)
CHLORIDE SERPL-SCNC: 108 MMOL/L (ref 98–107)
CO2 SERPL-SCNC: 24 MMOL/L (ref 22–29)
CREAT SERPL-MCNC: 2.93 MG/DL (ref 0.76–1.27)
DEPRECATED RDW RBC AUTO: 41.9 FL (ref 37–54)
EGFRCR SERPLBLD CKD-EPI 2021: 20.7 ML/MIN/1.73
EOSINOPHIL # BLD AUTO: 0.05 10*3/MM3 (ref 0–0.4)
EOSINOPHIL NFR BLD AUTO: 0.5 % (ref 0.3–6.2)
ERYTHROCYTE [DISTWIDTH] IN BLOOD BY AUTOMATED COUNT: 12.1 % (ref 12.3–15.4)
GLUCOSE BLDC GLUCOMTR-MCNC: 103 MG/DL (ref 70–130)
GLUCOSE BLDC GLUCOMTR-MCNC: 103 MG/DL (ref 70–130)
GLUCOSE BLDC GLUCOMTR-MCNC: 164 MG/DL (ref 70–130)
GLUCOSE BLDC GLUCOMTR-MCNC: 91 MG/DL (ref 70–130)
GLUCOSE BLDC GLUCOMTR-MCNC: 95 MG/DL (ref 70–130)
GLUCOSE SERPL-MCNC: 100 MG/DL (ref 65–99)
HCT VFR BLD AUTO: 32.1 % (ref 37.5–51)
HGB BLD-MCNC: 10.8 G/DL (ref 13–17.7)
IMM GRANULOCYTES # BLD AUTO: 0.06 10*3/MM3 (ref 0–0.05)
IMM GRANULOCYTES NFR BLD AUTO: 0.7 % (ref 0–0.5)
INR PPP: 1.21 (ref 0.9–1.1)
LYMPHOCYTES # BLD AUTO: 0.88 10*3/MM3 (ref 0.7–3.1)
LYMPHOCYTES NFR BLD AUTO: 9.6 % (ref 19.6–45.3)
MAGNESIUM SERPL-MCNC: 2.1 MG/DL (ref 1.6–2.4)
MCH RBC QN AUTO: 32.3 PG (ref 26.6–33)
MCHC RBC AUTO-ENTMCNC: 33.6 G/DL (ref 31.5–35.7)
MCV RBC AUTO: 96.1 FL (ref 79–97)
MONOCYTES # BLD AUTO: 0.47 10*3/MM3 (ref 0.1–0.9)
MONOCYTES NFR BLD AUTO: 5.1 % (ref 5–12)
NEUTROPHILS NFR BLD AUTO: 7.7 10*3/MM3 (ref 1.7–7)
NEUTROPHILS NFR BLD AUTO: 83.8 % (ref 42.7–76)
NRBC BLD AUTO-RTO: 0 /100 WBC (ref 0–0.2)
PHOSPHATE SERPL-MCNC: 4.8 MG/DL (ref 2.5–4.5)
PLATELET # BLD AUTO: 266 10*3/MM3 (ref 140–450)
PMV BLD AUTO: 9.5 FL (ref 6–12)
POTASSIUM SERPL-SCNC: 4.7 MMOL/L (ref 3.5–5.2)
PROTHROMBIN TIME: 15.3 SECONDS (ref 11.7–14.2)
QT INTERVAL: 325 MS
QT INTERVAL: 356 MS
QT INTERVAL: 379 MS
QT INTERVAL: 388 MS
QTC INTERVAL: 422 MS
QTC INTERVAL: 430 MS
QTC INTERVAL: 435 MS
QTC INTERVAL: 458 MS
RBC # BLD AUTO: 3.34 10*6/MM3 (ref 4.14–5.8)
SINUS: 2.8 CM
SODIUM SERPL-SCNC: 144 MMOL/L (ref 136–145)
STJ: 2.5 CM
URATE SERPL-MCNC: 6.3 MG/DL (ref 3.4–7)
WBC NRBC COR # BLD AUTO: 9.19 10*3/MM3 (ref 3.4–10.8)

## 2025-07-26 PROCEDURE — 85730 THROMBOPLASTIN TIME PARTIAL: CPT | Performed by: NURSE PRACTITIONER

## 2025-07-26 PROCEDURE — 82948 REAGENT STRIP/BLOOD GLUCOSE: CPT

## 2025-07-26 PROCEDURE — 99231 SBSQ HOSP IP/OBS SF/LOW 25: CPT | Performed by: NEUROLOGICAL SURGERY

## 2025-07-26 PROCEDURE — 80048 BASIC METABOLIC PNL TOTAL CA: CPT | Performed by: HOSPITALIST

## 2025-07-26 PROCEDURE — 94640 AIRWAY INHALATION TREATMENT: CPT

## 2025-07-26 PROCEDURE — 84550 ASSAY OF BLOOD/URIC ACID: CPT | Performed by: INTERNAL MEDICINE

## 2025-07-26 PROCEDURE — 94799 UNLISTED PULMONARY SVC/PX: CPT

## 2025-07-26 PROCEDURE — 25010000002 CEFAZOLIN PER 500 MG: Performed by: HOSPITALIST

## 2025-07-26 PROCEDURE — 70450 CT HEAD/BRAIN W/O DYE: CPT

## 2025-07-26 PROCEDURE — 85610 PROTHROMBIN TIME: CPT | Performed by: NURSE PRACTITIONER

## 2025-07-26 PROCEDURE — 85025 COMPLETE CBC W/AUTO DIFF WBC: CPT | Performed by: HOSPITALIST

## 2025-07-26 PROCEDURE — 84100 ASSAY OF PHOSPHORUS: CPT | Performed by: HOSPITALIST

## 2025-07-26 PROCEDURE — 94664 DEMO&/EVAL PT USE INHALER: CPT

## 2025-07-26 PROCEDURE — 25810000003 SODIUM CHLORIDE 0.9 % SOLUTION: Performed by: HOSPITALIST

## 2025-07-26 PROCEDURE — 83735 ASSAY OF MAGNESIUM: CPT | Performed by: HOSPITALIST

## 2025-07-26 PROCEDURE — 93306 TTE W/DOPPLER COMPLETE: CPT | Performed by: INTERNAL MEDICINE

## 2025-07-26 PROCEDURE — 94760 N-INVAS EAR/PLS OXIMETRY 1: CPT

## 2025-07-26 PROCEDURE — 94761 N-INVAS EAR/PLS OXIMETRY MLT: CPT

## 2025-07-26 PROCEDURE — 25010000002 FENTANYL CITRATE (PF) 50 MCG/ML SOLUTION

## 2025-07-26 PROCEDURE — 93306 TTE W/DOPPLER COMPLETE: CPT

## 2025-07-26 RX ORDER — SODIUM CHLORIDE 9 MG/ML
75 INJECTION, SOLUTION INTRAVENOUS CONTINUOUS
Status: DISCONTINUED | OUTPATIENT
Start: 2025-07-26 | End: 2025-07-28

## 2025-07-26 RX ORDER — ARFORMOTEROL TARTRATE 15 UG/2ML
15 SOLUTION RESPIRATORY (INHALATION)
Status: DISCONTINUED | OUTPATIENT
Start: 2025-07-26 | End: 2025-08-01 | Stop reason: HOSPADM

## 2025-07-26 RX ADMIN — IPRATROPIUM BROMIDE AND ALBUTEROL SULFATE 3 ML: .5; 3 SOLUTION RESPIRATORY (INHALATION) at 08:02

## 2025-07-26 RX ADMIN — FENTANYL CITRATE 50 MCG: 50 INJECTION, SOLUTION INTRAMUSCULAR; INTRAVENOUS at 01:17

## 2025-07-26 RX ADMIN — CEFAZOLIN 2000 MG: 2 INJECTION, POWDER, FOR SOLUTION INTRAMUSCULAR; INTRAVENOUS at 04:21

## 2025-07-26 RX ADMIN — MUPIROCIN 1 APPLICATION: 20 OINTMENT TOPICAL at 20:54

## 2025-07-26 RX ADMIN — FENTANYL CITRATE 50 MCG: 50 INJECTION, SOLUTION INTRAMUSCULAR; INTRAVENOUS at 03:48

## 2025-07-26 RX ADMIN — ARFORMOTEROL TARTRATE 15 MCG: 15 SOLUTION RESPIRATORY (INHALATION) at 12:22

## 2025-07-26 RX ADMIN — SENNOSIDES AND DOCUSATE SODIUM 2 TABLET: 50; 8.6 TABLET ORAL at 20:54

## 2025-07-26 RX ADMIN — CEFAZOLIN 2000 MG: 2 INJECTION, POWDER, FOR SOLUTION INTRAMUSCULAR; INTRAVENOUS at 17:12

## 2025-07-26 RX ADMIN — SODIUM CHLORIDE 100 ML/HR: 9 INJECTION, SOLUTION INTRAVENOUS at 14:11

## 2025-07-26 NOTE — PROGRESS NOTES
[facility]  [phone]  Dr. Eze Cortes    Subjective:  LOS: 1  [unfilled]      Chief Complaint: Subarachnoid hemorrhage    Awake alert and denies complaints states that he feels swell.  No cough or shortness of breath.  States his legs look no different than usual but unclear if he is a reliable historian.    Objective   Vital Signs past 24hrs  Temp range: Temp (24hrs), Av.8 °F (36.6 °C), Min:97.4 °F (36.3 °C), Max:98.3 °F (36.8 °C)    BP range: BP: ()/(58-87) 140/75  Pulse range: Heart Rate:  [] 73  Resp rate range: Resp:  [16-21] 16  Device (Oxygen Therapy): room air   Oxygen range:SpO2:  [65 %-100 %] 100 %   Mechanical Ventilator:     Physical Exam  Cardiovascular:      Rate and Rhythm: Normal rate and regular rhythm.      Heart sounds: No murmur heard.  Pulmonary:      Effort: Pulmonary effort is normal.      Breath sounds: Decreased breath sounds present.   Abdominal:      General: Bowel sounds are normal.      Palpations: Abdomen is soft. There is no mass.      Tenderness: There is no abdominal tenderness.   Musculoskeletal:         General: Swelling present.   Skin:     Findings: Erythema present.       Results Review:    I have reviewed the laboratory and imaging data since the last note by [facility] physician.  My annotations are noted in assessment and plan.      Result Review:  I have personally reviewed the results from last note by [facility] physician to 2025 11:49 EDT and agree with these findings:  [x]  Laboratory list / accordion  [x]  Microbiology  [x]  Radiology  []  EKG/Telemetry   []  Cardiology/Vascular   []  Pathology  []  Old records  []  Other:      Medication Review:  I have reviewed the current MAR.  My annotations are noted in assessment and plan.    ceFAZolin, 2,000 mg, Intravenous, Q12H  ipratropium-albuterol, 3 mL, Nebulization, Q6H While Awake - RT  mupirocin, 1 Application, Each Nare, BID  senna-docusate sodium, 2 tablet, Oral, BID           Lines, Drains &  Airways       Active LDAs       Name Placement date Placement time Site Days    Peripheral IV 07/25/25 1435 20 G Anterior;Left Forearm 07/25/25  1435  Forearm  less than 1    Peripheral IV 07/25/25 1621 20 G Anterior;Right Forearm 07/25/25  1621  Forearm  less than 1                    PCCM Problems  Subarachnoid hemorrhage  Left lower extremity cellulitis  Facial trauma  Falls  Acute kidney injury  Chronic obstructive pulmonary disease  Anemia  Troponin elevation  Lower extremity edema  Tobacco abuse  Anemia      THESE ARE NEW MEDICAL PROBLEMS TO ME.    Plan of Treatment    Appreciate neurosurgery input.  Stable CT head and no plans for intervention.  Okay to start aspirin on 7/27/2025.    Lower extremity cellulitis and on cefazolin.    Facial trauma and laceration and with stitches.    STEVENSON versus CKD.  Requires nephrology input.  Consult same.    COPD likely but not wheezing.  Add bronchodilators and recommend inhalers on discharge.    Current cigarette smoker and counseled to quit smoking.    Transfer out of ICU.    Consult [facility] to help manage cellulitis and anemia.    Eze Cortes MD  07/26/25  11:49 EDT    Isolation status: No active isolations    Dietary Orders (From admission, onward)       Start     Ordered    07/26/25 1136  Diet: Regular/House; Fluid Consistency: Thin (IDDSI 0)  Diet Effective Now        References:    Diet Order Definitions   Question Answer Comment   Diets: Regular/House    Fluid Consistency: Thin (IDDSI 0)        07/26/25 1135                    Part of this note may be an electronic transcription/translation of spoken language to printed text using the Dragon Dictation System.

## 2025-07-26 NOTE — NURSING NOTE
Yesenia BYRNES notified that patients heart rate was 210, EKG just showed sinus tach, old anterior infarct. HR currently 106. Straight cath secondary to abdominal pain and got 1125 back in urine. No new orders noted.

## 2025-07-26 NOTE — CONSULTS
[facility] Consult Note      Patient Name: Devin Scales  : 1940  MRN: [id number]  Primary Care Physician:  Meron Starks DO  Referring Physician: Alexis Valentino MD  Date of admission: 2025    Subjective     Reason for Consult: STEVENSON on likely CKD3a     HPI:   Devin Scales is a 84 y.o. male with possible CKD3a (previous SCr 1.2 May 2024), COPD, hypertension, and GERD, admitted yesterday after he fell.  Found to have SAH.  Hypotensive on arrival, with improvement in BP on IVF.  SCR 3.1 yesterday, with value 2.9 today.  Full PMH outlined below.    He thinks his weight has been stable times months, but states that he is not sure  No shortness of breath at rest; does have dyspnea with exertion  No chest pain  Describes difficulty urinating, with occasional need to strain  Bowel movements fine  Not aware of any fever    Review of Systems:   14 point review of systems is otherwise negative except for mentioned above on HPI    Personal History     Past Medical History:   Diagnosis Date    Benign essential tremor     BPH (benign prostatic hyperplasia)     Chest pain     Dyspnea     Fatigue     Hyperlipidemia        Past Surgical History:   Procedure Laterality Date    CARDIAC CATHETERIZATION N/A 2018    Procedure: Left Heart Cath;  Surgeon: Emelia Arellano MD;  Location: [facility] INVASIVE LOCATION;  Service:     CARDIAC CATHETERIZATION N/A 2018    Procedure: Coronary angiography;  Surgeon: Emelia Arellano MD;  Location: [facility] CATH INVASIVE LOCATION;  Service:     CARDIAC CATHETERIZATION N/A 2018    Procedure: Left ventriculography;  Surgeon: Emelia Arellano MD;  Location: [facility] INVASIVE LOCATION;  Service:        Family History: family history is not on file.    Social History:  reports that he has been smoking cigarettes. He has a 7.5 pack-year smoking history. He has never used smokeless tobacco. He reports current alcohol use. He reports that he does  not use drugs.    Home Medications:  Prior to Admission medications    Medication Sig Start Date End Date Taking? Authorizing Provider   aspirin 81 MG EC tablet Take 1 tablet by mouth Daily. 6/1/23   Avinash Castro MD   atorvastatin (LIPITOR) 80 MG tablet Take 1 tablet by mouth Daily.    Paul Araujo MD   Budeson-Glycopyrrol-Formoterol (Breztri Aerosphere) 160-9-4.8 MCG/ACT aerosol inhaler Inhale 2 puffs 2 (Two) Times a Day.    Paul Araujo MD   lisinopril (PRINIVIL,ZESTRIL) 10 MG tablet Take 1 tablet by mouth Daily. 6/1/23   Avinash Castro MD   pantoprazole (PROTONIX) 40 MG EC tablet Take 1 tablet by mouth 2 (Two) Times a Day Before Meals. 6/1/23   Avinash Castro MD   propranolol XL (INNOPRAN XL) 80 MG 24 hr capsule 1 capsule Daily.    ProviderPaul MD   tamsulosin (FLOMAX) 0.4 MG capsule 24 hr capsule Take 1 capsule by mouth Daily.    ProviderPaul MD       Allergies:  No Known Allergies    Objective     Vitals:   Temp:  [97.4 °F (36.3 °C)-98.3 °F (36.8 °C)] 97.6 °F (36.4 °C)  Heart Rate:  [] 99  Resp:  [16-20] 20  BP: ()/() 136/112    Intake/Output Summary (Last 24 hours) at 7/26/2025 1738  Last data filed at 7/26/2025 1737  Gross per 24 hour   Intake 1200 ml   Output 2125 ml   Net -925 ml       Physical Exam:   Constitutional: Awake, cooperative, NAD, chronically ill, frail, gaunt, not oriented  HEENT: Sclera anicteric, no conjunctival injection, laceration above right brow  Neck: Supple, no carotid bruit, trachea at midline, no JVD  Respiratory: Coarse bilaterally, nonlabored on RA  Cardiovascular: RR, tachycardic, no rub  Gastrointestinal: BS +, distended, soft, nontender   : ?palpable bladder  Musculoskeletal: Trace edema, no clubbing or cyanosis  Psychiatric: Blunted sensorium, cooperative, limited recall of recent events  Neurologic: No asterixis, moving all extremities, disorganized speech.    Skin: Warm and dry; erythema and blister  left lower leg/foot       Scheduled Meds:     arformoterol, 15 mcg, Nebulization, BID - RT  ceFAZolin, 2,000 mg, Intravenous, Q12H  mupirocin, 1 Application, Each Nare, BID  senna-docusate sodium, 2 tablet, Oral, BID      IV Meds:   sodium chloride, 100 mL/hr, Last Rate: 100 mL/hr (07/26/25 1411)        Results Reviewed:   I have personally reviewed the results from the time of this admission to 7/26/2025 17:38 EDT     Lab Results   Component Value Date    GLUCOSE 100 (H) 07/26/2025    CALCIUM 8.9 07/26/2025     07/26/2025    K 4.7 07/26/2025    CO2 24.0 07/26/2025     (H) 07/26/2025    BUN 34.0 (H) 07/26/2025    CREATININE 2.93 (H) 07/26/2025    EGFRIFAFRI 85 10/11/2021    EGFRIFNONA 73 10/11/2021    BCR 11.6 07/26/2025    ANIONGAP 12.0 07/26/2025      Lab Results   Component Value Date    MG 2.1 07/26/2025    PHOS 4.8 (H) 07/26/2025    ALBUMIN 3.4 (L) 07/25/2025           Assessment / Plan       Subarachnoid hematoma    SAH (subarachnoid hemorrhage)      ASSESSMENT:  1.  STEVENSON versus STEVENSON/CKD, nonoliguric, with SCR at plateau.  Likely prerenal from hide hypovolemia and hypotension, but wonder about urinary retention as ?palpable bladder on exam.  Mild hypernatremia suggesting water deficit; normal potassium and anion gap.  UA without blood or protein  2.  Recent fall with subarachnoid hemorrhage; MARTHA recommends conservative treatment for now  3.  Possible cellulitis left lower leg  4.  COPD  5.  Malnutrition    PLAN:  1.  IVF for now, though will change to half-normal saline if serum sodium climbs further  2.  Bladder scan each shift to ensure he is emptying well  3.  FENa  4.  Surveillance labs    Thank you for involving us in the care of Devin Scales.  Please feel free to call with any questions.    Merritt Sylvester MD  07/26/25  17:38 EDT    [facility]  [phone]      Please note that portions of this note were completed with a voice recognition program.

## 2025-07-26 NOTE — PROGRESS NOTES
NEUROSURGERY PROGRESS NOTE     LOS: 1 day   Patient Care Team:  Meron Starks DO as PCP - General (Family Medicine)  Herminio Humphrey MD as Consulting Physician (Cardiology)    Chief Complaint:    Chief Complaint   Patient presents with    Fall     Head lac, L leg swelling and redness   Multiple falls    Head trauma    Subjective     Interval History: NAEO.  Slightly confused this morning at neurological baseline    While in the room and during my examination of the patient I wore a mask and eye protection.  I washed my hands before and after this patient encounter.  The patient was also wearing a mask.     History taken from: patient RN    Objective      Vital Signs  Temp:  [97.4 °F (36.3 °C)-98.3 °F (36.8 °C)] 98 °F (36.7 °C)  Heart Rate:  [] 73  Resp:  [16-21] 16  BP: ()/(58-87) 140/75  Body mass index is 19.6 kg/m².    Intake/Output last 3 shifts:  I/O last 3 completed shifts:  In: -   Out: 2925 [Urine:2925]    Intake/Output this shift:  No intake/output data recorded.    Physical    GENERAL: No acute distress. Appears well nourished. Appears stated age.  HEENT: Scalp laceration repaired primarily  CARDIO: RRR on monitoring. Pulses 2+  PULM: breathing nonlabored on room air  NEURO: AAOx1. EOMI. PERRL. CNi. Strength 5/5 in all extremities. Sensation intact to light touch. Reflexes 2+ throughout.      Results Review:  I reviewed the patient's new clinical results.    Labs:    Lab Results (last 24 hours)       Procedure Component Value Units Date/Time    Urinalysis With Microscopic If Indicated (No Culture) - Urine, Clean Catch [[id number]]  (Abnormal) Collected: 07/25/25 1426    Specimen: Urine, Clean Catch Updated: 07/25/25 1450     Color, UA Yellow     Appearance, UA Clear     pH, UA 5.5     Specific Gravity, UA 1.010     Glucose, UA Negative     Ketones, UA Negative     Bilirubin, UA Negative     Blood, UA Negative     Protein, UA Negative     Leuk Esterase, UA Small (1+)     Nitrite, UA Negative      Urobilinogen, UA 0.2 E.U./dL    Urine Drug Screen - Urine, Clean Catch [[id number]]  (Normal) Collected: 07/25/25 1426    Specimen: Urine, Clean Catch Updated: 07/25/25 1452     THC, Screen, Urine Negative     Phencyclidine (PCP), Urine Negative     Cocaine Screen, Urine Negative     Methamphetamine, Ur Negative     Opiate Screen Negative     Amphetamine Screen, Urine Negative     Benzodiazepine Screen, Urine Negative     Tricyclic Antidepressants Screen Negative     Methadone Screen, Urine Negative     Barbiturates Screen, Urine Negative     Oxycodone Screen, Urine Negative     Buprenorphine, Screen, Urine Negative    Narrative:      Cutoff For Drugs Screened:    Amphetamines               500 ng/ml  Barbiturates               200 ng/ml  Benzodiazepines            150 ng/ml  Cocaine                    150 ng/ml  Methadone                  200 ng/ml  Opiates                    100 ng/ml  Phencyclidine               25 ng/ml  THC                         50 ng/ml  Methamphetamine            500 ng/ml  Tricyclic Antidepressants  300 ng/ml  Oxycodone                  100 ng/ml  Buprenorphine               10 ng/ml    The normal value for all drugs tested is negative. This report includes unconfirmed screening results, with the cutoff values listed, to be used for medical treatment purposes only.  Unconfirmed results must not be used for non-medical purposes such as employment or legal testing.  Clinical consideration should be applied to any drug of abuse test, particularly when unconfirmed results are used.      Fentanyl, Urine - Urine, Clean Catch [[id number]]  (Normal) Collected: 07/25/25 1426    Specimen: Urine, Clean Catch Updated: 07/25/25 1508     Fentanyl, Urine Negative    Narrative:      Negative Threshold:      Fentanyl 5 ng/mL     The normal value for the drug tested is negative. This report includes final unconfirmed screening results to be used for medical treatment purposes only. Unconfirmed results  must not be used for non-medical purposes such as employment or legal testing. Clinical consideration should be applied to any drug of abuse test, particularly when unconfirmed results are used.           Urinalysis, Microscopic Only - Urine, Clean Catch [[id number]]  (Abnormal) Collected: 07/25/25 1426    Specimen: Urine, Clean Catch Updated: 07/25/25 1454     RBC, UA 0-2 /HPF      WBC, UA 11-20 /HPF      Bacteria, UA None Seen /HPF      Squamous Epithelial Cells, UA 0-2 /HPF      Hyaline Casts, UA 0-2 /LPF      Methodology Automated Microscopy    CBC & Differential [[id number]]  (Abnormal) Collected: 07/25/25 1435    Specimen: Blood Updated: 07/25/25 1446    Narrative:      The following orders were created for panel order CBC & Differential.  Procedure                               Abnormality         Status                     ---------                               -----------         ------                     CBC Auto Differential[[id number]]        Abnormal            Final result                 Please view results for these tests on the individual orders.    Comprehensive Metabolic Panel [[id number]]  (Abnormal) Collected: 07/25/25 1435    Specimen: Blood Updated: 07/25/25 1508     Glucose 97 mg/dL      BUN 34.0 mg/dL      Creatinine 3.05 mg/dL      Sodium 141 mmol/L      Potassium 5.0 mmol/L      Chloride 104 mmol/L      CO2 26.0 mmol/L      Calcium 9.2 mg/dL      Total Protein 6.5 g/dL      Albumin 3.4 g/dL      ALT (SGPT) 19 U/L      AST (SGOT) 23 U/L      Alkaline Phosphatase 64 U/L      Total Bilirubin 0.5 mg/dL      Globulin 3.1 gm/dL      A/G Ratio 1.1 g/dL      BUN/Creatinine Ratio 11.1     Anion Gap 11.0 mmol/L      eGFR 19.7 mL/min/1.73     Narrative:      GFR Categories in Chronic Kidney Disease (CKD)              GFR Category          GFR (mL/min/1.73)    Interpretation  G1                    90 or greater        Normal or high (1)  G2                    60-89                Mild decrease (1)  G3a                    45-59                Mild to moderate decrease  G3b                   30-44                Moderate to severe decrease  G4                    15-29                Severe decrease  G5                    14 or less           Kidney failure    (1)In the absence of evidence of kidney disease, neither GFR category G1 or G2 fulfill the criteria for CKD.    eGFR calculation 2021 CKD-EPI creatinine equation, which does not include race as a factor    BNP [[id number]]  (Normal) Collected: 07/25/25 1435    Specimen: Blood Updated: 07/25/25 1505     proBNP 579.0 pg/mL     Narrative:      This assay is used as an aid in the diagnosis of individuals suspected of having heart failure. It can be used as an aid in the diagnosis of acute decompensated heart failure (ADHF) in patients presenting with signs and symptoms of ADHF to the emergency department (ED). In addition, NT-proBNP of <300 pg/mL indicates ADHF is not likely.    Age Range Result Interpretation  NT-proBNP Concentration (pg/mL:      <50             Positive            >450                   Gray                 300-450                    Negative             <300    50-75           Positive            >900                  Gray                300-900                  Negative            <300      >75             Positive            >1800                  Gray                300-1800                  Negative            <300    High Sensitivity Troponin T [[id number]]  (Abnormal) Collected: 07/25/25 1435    Specimen: Blood Updated: 07/25/25 1504     HS Troponin T 53 ng/L     Narrative:      High Sensitive Troponin T Reference Range:  <14.0 ng/L- Negative Female for AMI  <22.0 ng/L- Negative Male for AMI  >=14 - Abnormal Female indicating possible myocardial injury.  >=22 - Abnormal Male indicating possible myocardial injury.   Clinicians would have to utilize clinical acumen, EKG, Troponin, and serial changes to determine if it is an Acute Myocardial  Infarction or myocardial injury due to an underlying chronic condition.         CBC Auto Differential [[id number]]  (Abnormal) Collected: 07/25/25 1435    Specimen: Blood Updated: 07/25/25 1446     WBC 10.10 10*3/mm3      RBC 3.23 10*6/mm3      Hemoglobin 10.5 g/dL      Hematocrit 31.5 %      MCV 97.5 fL      MCH 32.5 pg      MCHC 33.3 g/dL      RDW 12.1 %      RDW-SD 42.7 fl      MPV 9.3 fL      Platelets 286 10*3/mm3      Neutrophil % 79.0 %      Lymphocyte % 12.4 %      Monocyte % 6.4 %      Eosinophil % 1.3 %      Basophil % 0.5 %      Immature Grans % 0.4 %      Neutrophils, Absolute 7.98 10*3/mm3      Lymphocytes, Absolute 1.25 10*3/mm3      Monocytes, Absolute 0.65 10*3/mm3      Eosinophils, Absolute 0.13 10*3/mm3      Basophils, Absolute 0.05 10*3/mm3      Immature Grans, Absolute 0.04 10*3/mm3      nRBC 0.0 /100 WBC     Ethanol [[id number]] Collected: 07/25/25 1435    Specimen: Blood Updated: 07/25/25 1508     Ethanol <10 mg/dL      Ethanol % <0.010 %     Narrative:      Not for legal purposes.    CK [[id number]]  (Normal) Collected: 07/25/25 1435    Specimen: Blood Updated: 07/25/25 1508     Creatine Kinase 172 U/L     High Sensitivity Troponin T 1Hr [[id number]]  (Abnormal) Collected: 07/25/25 1541    Specimen: Blood from Arm, Right Updated: 07/25/25 1611     HS Troponin T 59 ng/L      Troponin T Numeric Delta 6 ng/L      Troponin T % Delta 11    Narrative:      High Sensitive Troponin T Reference Range:  <14.0 ng/L- Negative Female for AMI  <22.0 ng/L- Negative Male for AMI  >=14 - Abnormal Female indicating possible myocardial injury.  >=22 - Abnormal Male indicating possible myocardial injury.   Clinicians would have to utilize clinical acumen, EKG, Troponin, and serial changes to determine if it is an Acute Myocardial Infarction or myocardial injury due to an underlying chronic condition.         POC Glucose Once [[id number]]  (Normal) Collected: 07/25/25 1732    Specimen: Blood Updated: 07/25/25 1743      Glucose 76 mg/dL     POC Glucose Once [[id number]]  (Normal) Collected: 07/25/25 2004    Specimen: Blood Updated: 07/25/25 2005     Glucose 103 mg/dL     POC Glucose Once [[id number]]  (Normal) Collected: 07/25/25 2337    Specimen: Blood Updated: 07/25/25 2338     Glucose 92 mg/dL     POC Glucose Once [[id number]]  (Normal) Collected: 07/26/25 0408    Specimen: Blood Updated: 07/26/25 0410     Glucose 103 mg/dL     Protime-INR [[id number]]  (Abnormal) Collected: 07/26/25 0627    Specimen: Blood Updated: 07/26/25 0653     Protime 15.3 Seconds      INR 1.21    aPTT [[id number]]  (Normal) Collected: 07/26/25 0627    Specimen: Blood Updated: 07/26/25 0653     PTT 27.5 seconds     Phosphorus [[id number]]  (Abnormal) Collected: 07/26/25 0627    Specimen: Blood Updated: 07/26/25 0753     Phosphorus 4.8 mg/dL     Magnesium [[id number]]  (Normal) Collected: 07/26/25 0627    Specimen: Blood Updated: 07/26/25 0715     Magnesium 2.1 mg/dL     CBC & Differential [[id number]]  (Abnormal) Collected: 07/26/25 0627    Specimen: Blood Updated: 07/26/25 0652    Narrative:      The following orders were created for panel order CBC & Differential.  Procedure                               Abnormality         Status                     ---------                               -----------         ------                     CBC Auto Differential[[id number]]        Abnormal            Final result                 Please view results for these tests on the individual orders.    Basic Metabolic Panel [[id number]]  (Abnormal) Collected: 07/26/25 0627    Specimen: Blood Updated: 07/26/25 0715     Glucose 100 mg/dL      BUN 34.0 mg/dL      Creatinine 2.93 mg/dL      Sodium 144 mmol/L      Potassium 4.7 mmol/L      Chloride 108 mmol/L      CO2 24.0 mmol/L      Calcium 8.9 mg/dL      BUN/Creatinine Ratio 11.6     Anion Gap 12.0 mmol/L      eGFR 20.7 mL/min/1.73     Narrative:      GFR Categories in Chronic Kidney Disease (CKD)              GFR Category           GFR (mL/min/1.73)    Interpretation  G1                    90 or greater        Normal or high (1)  G2                    60-89                Mild decrease (1)  G3a                   45-59                Mild to moderate decrease  G3b                   30-44                Moderate to severe decrease  G4                    15-29                Severe decrease  G5                    14 or less           Kidney failure    (1)In the absence of evidence of kidney disease, neither GFR category G1 or G2 fulfill the criteria for CKD.    eGFR calculation 2021 CKD-EPI creatinine equation, which does not include race as a factor    CBC Auto Differential [[id number]]  (Abnormal) Collected: 07/26/25 0627    Specimen: Blood Updated: 07/26/25 0652     WBC 9.19 10*3/mm3      RBC 3.34 10*6/mm3      Hemoglobin 10.8 g/dL      Hematocrit 32.1 %      MCV 96.1 fL      MCH 32.3 pg      MCHC 33.6 g/dL      RDW 12.1 %      RDW-SD 41.9 fl      MPV 9.5 fL      Platelets 266 10*3/mm3      Neutrophil % 83.8 %      Lymphocyte % 9.6 %      Monocyte % 5.1 %      Eosinophil % 0.5 %      Basophil % 0.3 %      Immature Grans % 0.7 %      Neutrophils, Absolute 7.70 10*3/mm3      Lymphocytes, Absolute 0.88 10*3/mm3      Monocytes, Absolute 0.47 10*3/mm3      Eosinophils, Absolute 0.05 10*3/mm3      Basophils, Absolute 0.03 10*3/mm3      Immature Grans, Absolute 0.06 10*3/mm3      nRBC 0.0 /100 WBC     POC Glucose Once [[id number]]  (Normal) Collected: 07/26/25 0717    Specimen: Blood Updated: 07/26/25 0728     Glucose 103 mg/dL             Imaging:    CT: CT of the head was reviewed and shows stable small amount of traumatic subarachnoid hemorrhage scattered in the right frontal lobe and some in the left frontal lobe near the vertex    Current Medications:   Scheduled Meds:ceFAZolin, 2,000 mg, Intravenous, Q12H  ipratropium-albuterol, 3 mL, Nebulization, Q6H While Awake - RT  mupirocin, 1 Application, Each Nare, BID  senna-docusate sodium, 2 tablet,  "Oral, BID      Continuous Infusions:Pharmacy To Dose:,         Assessment & Plan       Subarachnoid hematoma    SAH (subarachnoid hemorrhage)      Assessment/Plan:  Devin Scales is a 84 y.o. male with concussion, traumatic subarachnoid hemorrhage    Neuro: Continue routine neuro checks.  Stable head CT.  No plan for any neurosurgical intervention.  Okay to resume aspirin 81 7/27  Cardio: Goal Normotensive  Pulm: Room air.  Activity: No restrictions to activity., Encourage ambulation., Work with PT/OT.  Diet: Advance diet as tolerated.  DVT Prophylaxis: Okay for prophylactic Lovenox 7/28  GI Prophylaxis: Not indicated due to low risk.  Dispo: Dispo per primary team.  Thank you for letting us take care of this patient, please call with any questions or concerns      Brian Joiner MD  07/26/25  11:17 EDT    \"Dictated utilizing Dragon dictation\".      "

## 2025-07-26 NOTE — PLAN OF CARE
Problem: Violence Risk or Actual  Goal: Anger and Impulse Control  Outcome: Progressing  Intervention: Minimize Safety Risk  Recent Flowsheet Documentation  Taken 7/26/2025 0200 by Marlee Joel RN  De-Escalation Techniques: stimulation decreased  Enhanced Safety Measures: bed alarm set  Taken 7/25/2025 2359 by Marlee Joel RN  De-Escalation Techniques: stimulation decreased  Enhanced Safety Measures: bed alarm set  Taken 7/25/2025 2220 by Marlee Joel RN  De-Escalation Techniques: stimulation decreased  Enhanced Safety Measures: bed alarm set  Taken 7/25/2025 2002 by Marlee Joel RN  Behavior Management: boundaries reinforced  Sensory Stimulation Regulation: care clustered  De-Escalation Techniques: quiet time facilitated  Enhanced Safety Measures: bed alarm set  Intervention: Promote Self-Control  Recent Flowsheet Documentation  Taken 7/25/2025 2002 by Marlee Joel RN  Supportive Measures: active listening utilized  Environmental Support: calm environment promoted     Problem: Adult Inpatient Plan of Care  Goal: Plan of Care Review  Outcome: Progressing  Flowsheets (Taken 7/26/2025 0231)  Plan of Care Reviewed With: patient  Goal: Patient-Specific Goal (Individualized)  Outcome: Progressing  Goal: Absence of Hospital-Acquired Illness or Injury  Outcome: Progressing  Intervention: Identify and Manage Fall Risk  Recent Flowsheet Documentation  Taken 7/26/2025 0200 by Marlee Joel RN  Safety Promotion/Fall Prevention: safety round/check completed  Taken 7/25/2025 2359 by Marlee Joel RN  Safety Promotion/Fall Prevention: safety round/check completed  Taken 7/25/2025 2220 by Marlee Joel RN  Safety Promotion/Fall Prevention: safety round/check completed  Taken 7/25/2025 2002 by Marlee Joel RN  Safety Promotion/Fall Prevention: safety round/check completed  Intervention: Prevent Skin Injury  Recent Flowsheet Documentation  Taken 7/26/2025 0200 by Marlee Joel RN  Body Position: position  changed independently  Taken 7/25/2025 2359 by Marlee Joel RN  Body Position: position changed independently  Taken 7/25/2025 2220 by Marlee Joel RN  Body Position: turned  Taken 7/25/2025 2002 by Marlee Joel RN  Body Position: position changed independently  Skin Protection: protective footwear used  Intervention: Prevent and Manage VTE (Venous Thromboembolism) Risk  Recent Flowsheet Documentation  Taken 7/25/2025 2002 by Marlee Joel RN  VTE Prevention/Management: (scd ordered) other (see comments)  Intervention: Prevent Infection  Recent Flowsheet Documentation  Taken 7/26/2025 0200 by Marlee Joel RN  Infection Prevention: single patient room provided  Taken 7/25/2025 2359 by Marlee Joel RN  Infection Prevention: single patient room provided  Taken 7/25/2025 2220 by Marlee Joel RN  Infection Prevention: single patient room provided  Taken 7/25/2025 2002 by Marlee Joel RN  Infection Prevention: single patient room provided  Goal: Optimal Comfort and Wellbeing  Outcome: Progressing  Intervention: Provide Person-Centered Care  Recent Flowsheet Documentation  Taken 7/25/2025 2002 by Marlee Joel RN  Trust Relationship/Rapport:   care explained   choices provided  Goal: Readiness for Transition of Care  Outcome: Progressing  Intervention: Mutually Develop Transition Plan  Recent Flowsheet Documentation  Taken 7/25/2025 2224 by Marlee Joel RN  Equipment Currently Used at Home: none  Taken 7/25/2025 2221 by Marlee Joel RN  Transportation Anticipated: health plan transportation  Patient/Family Anticipated Services at Transition:   Patient/Family Anticipates Transition to: long-term care facility     Problem: Fall Injury Risk  Goal: Absence of Fall and Fall-Related Injury  Outcome: Progressing  Intervention: Identify and Manage Contributors  Recent Flowsheet Documentation  Taken 7/26/2025 0200 by Marlee Joel RN  Medication Review/Management: medications  reviewed  Taken 7/25/2025 2359 by Marlee Joel RN  Medication Review/Management: medications reviewed  Taken 7/25/2025 2220 by Marlee Joel RN  Medication Review/Management: medications reviewed  Taken 7/25/2025 2002 by Marlee Joel RN  Medication Review/Management: medications reviewed  Intervention: Promote Injury-Free Environment  Recent Flowsheet Documentation  Taken 7/26/2025 0200 by Marlee Joel RN  Safety Promotion/Fall Prevention: safety round/check completed  Taken 7/25/2025 2359 by Marlee Joel RN  Safety Promotion/Fall Prevention: safety round/check completed  Taken 7/25/2025 2220 by Marlee Joel RN  Safety Promotion/Fall Prevention: safety round/check completed  Taken 7/25/2025 2002 by Marlee Joel RN  Safety Promotion/Fall Prevention: safety round/check completed     Problem: Skin Injury Risk Increased  Goal: Skin Health and Integrity  Outcome: Progressing  Intervention: Optimize Skin Protection  Recent Flowsheet Documentation  Taken 7/26/2025 0200 by Marlee Joel RN  Head of Bed (HOB) Positioning: HOB at 30 degrees  Taken 7/25/2025 2359 by Malree Joel RN  Head of Bed (HOB) Positioning: HOB at 30 degrees  Taken 7/25/2025 2220 by Marlee Joel RN  Head of Bed (HOB) Positioning: HOB at 30 degrees  Taken 7/25/2025 2002 by Marlee Joel RN  Pressure Reduction Techniques: frequent weight shift encouraged  Head of Bed (HOB) Positioning: HOB at 20 degrees  Pressure Reduction Devices: specialty bed utilized  Skin Protection: protective footwear used     Problem: Comorbidity Management  Goal: Blood Pressure in Desired Range  Outcome: Progressing  Intervention: Maintain Blood Pressure Management  Recent Flowsheet Documentation  Taken 7/26/2025 0200 by Marlee Joel RN  Medication Review/Management: medications reviewed  Taken 7/25/2025 2359 by Marlee Joel RN  Medication Review/Management: medications reviewed  Taken 7/25/2025 2220 by Marlee Joel RN  Medication  Review/Management: medications reviewed  Taken 7/25/2025 2002 by Marlee Joel, RN  Medication Review/Management: medications reviewed   Goal Outcome Evaluation:  Plan of Care Reviewed With: patient

## 2025-07-26 NOTE — PLAN OF CARE
Problem: Violence Risk or Actual  Goal: Anger and Impulse Control  Outcome: Progressing  Intervention: Minimize Safety Risk  Description: Listen actively, observing verbal and nonverbal cues (e.g., irritability, confusion, lack of cooperation, demanding behavior, body posture, expression); take threats seriously.Maintain a therapeutic presence; utilize calm, empathetic tone of voice, nonjudgmental attitude and nonthreatening body language; listen carefully.Assess and provide for unmet needs, including nutrition, comfort, hydration, hygiene, companionship and appropriate rest.Utilize empathetic but firm and concise communication; set limits, offer choices and propose alternatives.Remove stimuli and objects that may lead to harming self or others.Maintain clear path to room exit; keep door open during care.Ask directly about homicidal and suicidal intent; provide additional safety measures based on level of risk (e.g., one-on-one observation, duty to warn).Implement least restrictive measures if attempts to de-escalate violent or injurious behaviors are unsuccessful.  Recent Flowsheet Documentation  Taken 7/26/2025 1700 by Ricardo Massey RN  Enhanced Safety Measures: bed alarm set  Taken 7/26/2025 1600 by Ricardo Massey RN  Enhanced Safety Measures: bed alarm set  Taken 7/26/2025 1500 by Ricardo Massey RN  Enhanced Safety Measures: bed alarm set  Taken 7/26/2025 1400 by Ricardo Massey RN  Enhanced Safety Measures: bed alarm set  Taken 7/26/2025 1300 by Ricardo Massey RN  Enhanced Safety Measures: bed alarm set  Taken 7/26/2025 1200 by Ricardo Massey RN  Enhanced Safety Measures: bed alarm set  Taken 7/26/2025 1100 by Ricardo Massey RN  Enhanced Safety Measures: bed alarm set  Taken 7/26/2025 1000 by Ricardo Massey RN  Enhanced Safety Measures: bed alarm set  Taken 7/26/2025 0900 by Ricardo Massey RN  Enhanced Safety Measures: bed alarm set  Taken  7/26/2025 0800 by Ricardo Massey, RN  Enhanced Safety Measures: bed alarm set  Taken 7/26/2025 0700 by Ricardo Massey, RN  Enhanced Safety Measures: bed alarm set   Goal Outcome Evaluation:  Plan of Care Reviewed With: family, patient        Progress: improving

## 2025-07-26 NOTE — PROGRESS NOTES
"    DAILY PROGRESS NOTE  [facility]    Patient Identification:  Name: Devin Scales  Age: 84 y.o.  Sex: male  :  1940  MRN: [id number]         Primary Care Physician: Meron Starks DO    Subjective:  Interval History: Resting comfortably in bed.  He is not really voicing any new concerns with me.  He is not the most well-spoken or best of historians but denies any fever and does not feel confusion.  When I try to get him to specifically tell me how long his leg is looked as such, he has difficulties elaborating at times he will tell me it has been there a couple days to a week and then at other times will say all it has been like that for a while.  RN present at bedside- he states son is involved with no family currently present    Objective:    Scheduled Meds:arformoterol, 15 mcg, Nebulization, BID - RT  ceFAZolin, 2,000 mg, Intravenous, Q12H  mupirocin, 1 Application, Each Nare, BID  senna-docusate sodium, 2 tablet, Oral, BID      Continuous Infusions:     Vital signs in last 24 hours:  Temp:  [97.4 °F (36.3 °C)-98.3 °F (36.8 °C)] 97.6 °F (36.4 °C)  Heart Rate:  [] 66  Resp:  [16-21] 16  BP: ()/(58-87) 140/75    Intake/Output:    Intake/Output Summary (Last 24 hours) at 2025 1232  Last data filed at 2025 0403  Gross per 24 hour   Intake --   Output 2925 ml   Net -2925 ml       Exam:  /75   Pulse 66   Temp 97.6 °F (36.4 °C) (Oral)   Resp 16   Ht 172 cm (67.72\")   Wt 58 kg (127 lb 13.9 oz)   SpO2 100%   BMI 19.60 kg/m²     General Appearance:    Alert, cooperative, quite disheveled, very poor hygiene, struggles with specifics                          Head:    Normocephalic, trauma noted to occiput with sutures to right forehead with good hemostasis                           Eyes:    PERRLA/EOM's intact, both eyes                         Throat: Very dry mucous membranes                           Neck:   Supple, symmetrical, trachea midline, no JVD           "               Lungs:    Clear to auscultation bilaterally, respirations unlabored                 Chest Wall:    No tenderness or deformity.  Ribs and strap muscles easily outlined                          Heart:    Regular rate and rhythm, S1 and S2 normal                  Abdomen:     Soft, nontender, bowel sounds active                 Extremities: Left lower extremity with some streaking erythema with a large blister to the dorsum of his foot.  No warmth and no signs of elevating lymphangitis                        Pulses:   Pulses palpable in all extremities                  Neurologic:   CNII-XII intact.  Gait not evaluated       Data Review:  Labs in chart were reviewed.    Assessment:  Active Hospital Problems    Diagnosis  POA    **Subarachnoid hematoma [S06.6XAA]  Yes    SAH (subarachnoid hemorrhage) [I60.9]  Yes      Resolved Hospital Problems   No resolved problems to display.       Plan:    Cellulitis lower extremity on cefazolin   - Not very impressive but he was already on cefazolin and I outlined the tracings to his left lower extremity in regards to the erythema.  I cannot appreciate any increased warmth but based on his very poor hygiene and poor lack of skin, he is definitely at an increased risk for developing cellulitis.  I cannot appreciate any ascending lymphangitis and will continue with the current regimen and monitor for any expansion      Traumatic SAH-stable CT per MARTHA with no plans for intervention and okay to restart aspirin on 7/27/2025.  Secondary facial trauma with laceration and stitches      ARF versus CKD/ACD-renal pending.  Limited past creatinines here but it appears back in 2023 it was normal.  Creatinine upon presentation 3.0 trending down to 2.93   - Seems clinically quite dry to me, will initiate normal saline at 100 cc/h until renal can evaluate   - Trend RFP with uric in a.m.   - Iron studies per Dr. Cortes noted      COPD without acute exacerbation on bronchodilators  compounded by tobacco use    UDS and alcohol levels unremarkable    Elevated troponin secondary to troponin leak/CKD with no ACS noted on EKGs   - Echo already completed with report pending.  If anything abnormal then suggest cardiology consult   Previous stress noted from 2023-with small size infarct without ischemia/low risk study    Severely malnourished.  Nutrition to do assessment.  Add prealbumin to a.m. labs      Case discussed with /PH[place]ENIX happy to assume care as attending      Disposition: Will need PT and OT evaluations.  CCP for DC needs.  I am not sure how safe this gentleman it is in the home setting?    Tramaine Mccloud MD  7/26/2025  12:32 EDT

## 2025-07-27 PROBLEM — L03.116 CELLULITIS OF LEFT LOWER EXTREMITY: Status: ACTIVE | Noted: 2025-07-27

## 2025-07-27 LAB
ALBUMIN SERPL-MCNC: 3.5 G/DL (ref 3.5–5.2)
ANION GAP SERPL CALCULATED.3IONS-SCNC: 16 MMOL/L (ref 5–15)
BUN SERPL-MCNC: 33 MG/DL (ref 8–23)
BUN/CREAT SERPL: 9.4 (ref 7–25)
CALCIUM SPEC-SCNC: 8.9 MG/DL (ref 8.6–10.5)
CHLORIDE SERPL-SCNC: 106 MMOL/L (ref 98–107)
CO2 SERPL-SCNC: 20 MMOL/L (ref 22–29)
CREAT SERPL-MCNC: 3.51 MG/DL (ref 0.76–1.27)
EGFRCR SERPLBLD CKD-EPI 2021: 16.7 ML/MIN/1.73
GLUCOSE BLDC GLUCOMTR-MCNC: 120 MG/DL (ref 70–130)
GLUCOSE BLDC GLUCOMTR-MCNC: 124 MG/DL (ref 70–130)
GLUCOSE BLDC GLUCOMTR-MCNC: 132 MG/DL (ref 70–130)
GLUCOSE BLDC GLUCOMTR-MCNC: 136 MG/DL (ref 70–130)
GLUCOSE SERPL-MCNC: 130 MG/DL (ref 65–99)
IRON 24H UR-MRATE: 38 MCG/DL (ref 59–158)
IRON SATN MFR SERPL: 14 % (ref 20–50)
PHOSPHATE SERPL-MCNC: 3.7 MG/DL (ref 2.5–4.5)
POTASSIUM SERPL-SCNC: 4.2 MMOL/L (ref 3.5–5.2)
PREALB SERPL-MCNC: 11.6 MG/DL (ref 20–40)
SODIUM SERPL-SCNC: 142 MMOL/L (ref 136–145)
TIBC SERPL-MCNC: 264 MCG/DL (ref 298–536)
TRANSFERRIN SERPL-MCNC: 177 MG/DL (ref 200–360)
URATE SERPL-MCNC: 6 MG/DL (ref 3.4–7)

## 2025-07-27 PROCEDURE — 25010000002 HYDRALAZINE PER 20 MG: Performed by: HOSPITALIST

## 2025-07-27 PROCEDURE — 97530 THERAPEUTIC ACTIVITIES: CPT

## 2025-07-27 PROCEDURE — 84466 ASSAY OF TRANSFERRIN: CPT | Performed by: INTERNAL MEDICINE

## 2025-07-27 PROCEDURE — 94761 N-INVAS EAR/PLS OXIMETRY MLT: CPT

## 2025-07-27 PROCEDURE — 84550 ASSAY OF BLOOD/URIC ACID: CPT | Performed by: HOSPITALIST

## 2025-07-27 PROCEDURE — 84134 ASSAY OF PREALBUMIN: CPT | Performed by: HOSPITALIST

## 2025-07-27 PROCEDURE — 94799 UNLISTED PULMONARY SVC/PX: CPT

## 2025-07-27 PROCEDURE — 94664 DEMO&/EVAL PT USE INHALER: CPT

## 2025-07-27 PROCEDURE — 82948 REAGENT STRIP/BLOOD GLUCOSE: CPT

## 2025-07-27 PROCEDURE — 25810000003 SODIUM CHLORIDE 0.9 % SOLUTION: Performed by: HOSPITALIST

## 2025-07-27 PROCEDURE — 25010000002 CEFAZOLIN PER 500 MG: Performed by: HOSPITALIST

## 2025-07-27 PROCEDURE — 97162 PT EVAL MOD COMPLEX 30 MIN: CPT

## 2025-07-27 PROCEDURE — 80069 RENAL FUNCTION PANEL: CPT | Performed by: HOSPITALIST

## 2025-07-27 PROCEDURE — 83540 ASSAY OF IRON: CPT | Performed by: INTERNAL MEDICINE

## 2025-07-27 RX ORDER — ASPIRIN 81 MG/1
81 TABLET ORAL DAILY
Status: DISCONTINUED | OUTPATIENT
Start: 2025-07-27 | End: 2025-08-01 | Stop reason: HOSPADM

## 2025-07-27 RX ADMIN — SENNOSIDES AND DOCUSATE SODIUM 2 TABLET: 50; 8.6 TABLET ORAL at 20:31

## 2025-07-27 RX ADMIN — ACETAMINOPHEN 650 MG: 325 TABLET, FILM COATED ORAL at 18:00

## 2025-07-27 RX ADMIN — MUPIROCIN 1 APPLICATION: 20 OINTMENT TOPICAL at 20:31

## 2025-07-27 RX ADMIN — ARFORMOTEROL TARTRATE 15 MCG: 15 SOLUTION RESPIRATORY (INHALATION) at 20:25

## 2025-07-27 RX ADMIN — ASPIRIN 81 MG: 81 TABLET, COATED ORAL at 13:36

## 2025-07-27 RX ADMIN — CEFAZOLIN 2000 MG: 2 INJECTION, POWDER, FOR SOLUTION INTRAMUSCULAR; INTRAVENOUS at 04:18

## 2025-07-27 RX ADMIN — ARFORMOTEROL TARTRATE 15 MCG: 15 SOLUTION RESPIRATORY (INHALATION) at 08:04

## 2025-07-27 RX ADMIN — SENNOSIDES AND DOCUSATE SODIUM 2 TABLET: 50; 8.6 TABLET ORAL at 10:46

## 2025-07-27 RX ADMIN — MUPIROCIN 1 APPLICATION: 20 OINTMENT TOPICAL at 10:46

## 2025-07-27 RX ADMIN — HYDRALAZINE HYDROCHLORIDE 10 MG: 20 INJECTION INTRAMUSCULAR; INTRAVENOUS at 04:19

## 2025-07-27 RX ADMIN — SODIUM CHLORIDE 100 ML/HR: 9 INJECTION, SOLUTION INTRAVENOUS at 01:35

## 2025-07-27 NOTE — THERAPY EVALUATION
Patient Name: Devin Scales  : 1940    MRN: [id number]                              Today's Date: 2025       Admit Date: 2025    Visit Dx:     ICD-10-CM ICD-9-CM   1. SAH (subarachnoid hemorrhage)  I60.9 430   2. STEVENSON (acute kidney injury)  N17.9 584.9   3. Laceration of forehead, initial encounter  S01.81XA 873.42     Patient Active Problem List   Diagnosis    Subarachnoid hemorrhage following injury, no loss of consciousness, initial encounter    COPD (chronic obstructive pulmonary disease)    Cigarette smoker    Alcohol dependence    Peripheral vascular disease    CSF leak    Acute kidney injury    Subarachnoid hematoma    SAH (subarachnoid hemorrhage)    Cellulitis of left lower extremity     Past Medical History:   Diagnosis Date    Benign essential tremor     BPH (benign prostatic hyperplasia)     Chest pain     Dyspnea     Fatigue     Hyperlipidemia      Past Surgical History:   Procedure Laterality Date    CARDIAC CATHETERIZATION N/A 2018    Procedure: Left Heart Cath;  Surgeon: Emelia Arellano MD;  Location:  [facility];  Service:     CARDIAC CATHETERIZATION N/A 2018    Procedure: Coronary angiography;  Surgeon: Emelia Arellano MD;  Location:  [facility];  Service:     CARDIAC CATHETERIZATION N/A 2018    Procedure: Left ventriculography;  Surgeon: Emelia Arellano MD;  Location:  [facility];  Service:       General Information       Row Name 25 1555          Physical Therapy Time and Intention    Document Type evaluation  -     Mode of Treatment individual therapy;physical therapy  -       Row Name 25 1555          General Information    Patient Profile Reviewed yes  -     Prior Level of Function independent:;gait;transfer;bed mobility  -     Existing Precautions/Restrictions fall  -     Barriers to Rehab medically complex;previous functional deficit;cognitive status  -       Row Name 25 1551           Living Environment    Current Living Arrangements home  -     People in Home alone  -       Row Name 07/27/25 1555          Cognition    Orientation Status (Cognition) oriented to;person  -       Row Name 07/27/25 1555          Safety Issues/Impairments Affecting Functional Mobility    Impairments Affecting Function (Mobility) balance;cognition;endurance/activity tolerance;strength;pain;range of motion (ROM)  -               User Key  (r) = Recorded By, (t) = Taken By, (c) = Cosigned By      Initials Name Provider Type     Ailyn Masters PT Physical Therapist                   Mobility       Row Name 07/27/25 1556          Bed Mobility    Bed Mobility supine-sit;sit-supine  -     Supine-Sit [place] (Bed Mobility) minimum assist (75% patient effort);1 person assist;verbal cues  -     Sit-Supine [place] (Bed Mobility) minimum assist (75% patient effort);1 person assist;verbal cues  -     Assistive Device (Bed Mobility) head of bed elevated;bed rails  -[facility] Name 07/27/25 1556          Sit-Stand Transfer    Sit-Stand [place] (Transfers) moderate assist (50% patient effort);minimum assist (75% patient effort);2 person assist;verbal cues  -     Assistive Device (Sit-Stand Transfers) walker, front-wheeled  -[facility] Name 07/27/25 1556          Gait/Stairs (Locomotion)    [place] Level (Gait) minimum assist (75% patient effort);2 person assist;verbal cues  -     Assistive Device (Gait) walker, front-wheeled  -     Distance in Feet (Gait) 3  side steps  -     Deviations/Abnormal Patterns (Gait) antalgic;alirio decreased;gait speed decreased;stride length decreased;weight shifting decreased;base of support, narrow;festinating/shuffling  -     Bilateral Gait Deviations forward flexed posture;heel strike decreased  -               User Key  (r) = Recorded By, (t) = Taken By, (c) = Cosigned By      Initials Name Provider Type    Ailyn Mcneil PT  Physical Therapist                   Obj/Interventions       [facility] Name 07/27/25 1558          Range of Motion Comprehensive    General Range of Motion lower extremity range of motion deficits identified  -     Comment, General Range of Motion Generalized stiffness  -[facility] Name 07/27/25 1551          Strength Comprehensive (MMT)    General Manual Muscle Testing (MMT) Assessment lower extremity strength deficits identified  -     Comment, General Manual Muscle Testing (MMT) Assessment Generalized weakness  -[facility] Name 07/27/25 1558          Balance    Balance Assessment sitting static balance;sitting dynamic balance;standing static balance;standing dynamic balance  -     Static Sitting Balance contact guard;verbal cues  -     Dynamic Sitting Balance contact guard;verbal cues  -     Position, Sitting Balance sitting edge of bed  -     Static Standing Balance minimal assist;verbal cues  -     Dynamic Standing Balance minimal assist;verbal cues  -     Position/Device Used, Standing Balance supported;walker, front-wheeled  -     Balance Interventions sitting;standing;sit to stand;supported;static;dynamic  -[facility] Name 07/27/25 1556          Sensory Assessment (Somatosensory)    Sensory Assessment (Somatosensory) LE sensation intact  -               User Key  (r) = Recorded By, (t) = Taken By, (c) = Cosigned By      Initials Name Provider Type     Ailyn Masters, PT Physical Therapist                   Goals/Plan       [facility] Name 07/27/25 1609          Bed Mobility Goal 1 (PT)    Activity/Assistive Device (Bed Mobility Goal 1, PT) bed mobility activities, all  -     [place] Level/Cues Needed (Bed Mobility Goal 1, PT) contact guard required  -     Time Frame (Bed Mobility Goal 1, PT) 1 week  -[facility] Name 07/27/25 1604          Transfer Goal 1 (PT)    Activity/Assistive Device (Transfer Goal 1, PT) transfers, all  -     [place] Level/Cues Needed (Transfer Goal 1,  PT) contact guard required  -     Time Frame (Transfer Goal 1, PT) 1 week  -       Row Name 07/27/25 1601          Gait Training Goal 1 (PT)    Activity/Assistive Device (Gait Training Goal 1, PT) gait (walking locomotion)  -     [place] Level (Gait Training Goal 1, PT) contact guard required  -     Distance (Gait Training Goal 1, PT) 50ft  -     Time Frame (Gait Training Goal 1, PT) 1 week  -       Row Name 07/27/25 1268          Therapy Assessment/Plan (PT)    Planned Therapy Interventions (PT) balance training;bed mobility training;gait training;home exercise program;patient/family education;ROM (range of motion);stair training;strengthening;stretching;transfer training  -               User Key  (r) = Recorded By, (t) = Taken By, (c) = Cosigned By      Initials Name Provider Type     Ailyn Masters, PT Physical Therapist                   Clinical Impression       Row Name 07/27/25 7258          Pain    Pretreatment Pain Rating 0/10 - no pain  -     Posttreatment Pain Rating 0/10 - no pain  -       Row Name 07/27/25 4824          Plan of Care Review    Plan of Care Reviewed With patient  -     Outcome Evaluation Pt is an 83 yo M admitted from home after being found down by his neighbor - apparently has had multiple falls recently. Work-up revealed SAH and facial trauma with lacerations. Pt also with LLE cellulitis. Pt lives alone, AO to self only on eval so unable to provide history. Pt presents to PT with impaired strength, endurance, and cognition limiting overall mobility. Pt transferred to EOB with min A x1, STS with min A x2 and took several side steps  towards HOB with min-mod A x2 and rwx. Pt requiring heavy sequencing cues and assist with rwx, posterior lean with generalized unsteadiness and unsafe to progress gait away from bed. Pt returned to supine min A x1 and repositioned in bed, left with needs met. PT will continue to follow, anticipate DC to SNF.  -       Row Name  07/27/25 1559          Therapy Assessment/Plan (PT)    Patient/Family Therapy Goals Statement (PT) Return to OF  -     Rehab Potential (PT) good  -     Criteria for Skilled Interventions Met (PT) yes  -     Therapy Frequency (PT) 5 times/wk  -       Row Name 07/27/25 1559          Vital Signs    O2 Delivery Pre Treatment room air  -     O2 Delivery Intra Treatment room air  -     O2 Delivery Post Treatment room air  -       Row Name 07/27/25 1559          Positioning and Restraints    Pre-Treatment Position in bed  -     Post Treatment Position bed  -     In Bed notified nsg;fowlers;call light within reach;encouraged to call for assist;exit alarm on  -               User Key  (r) = Recorded By, (t) = Taken By, (c) = Cosigned By      Initials Name Provider Type     Ailyn Masters, PT Physical Therapist                   Outcome Measures       Row Name 07/27/25 1605 07/27/25 0820       How much help from another person do you currently need...    Turning from your back to your side while in flat bed without using bedrails? 3  - 3  -RF    Moving from lying on back to sitting on the side of a flat bed without bedrails? 3  - 3  -RF    Moving to and from a bed to a chair (including a wheelchair)? 3  - 3  -RF    Standing up from a chair using your arms (e.g., wheelchair, bedside chair)? 3  - 3  -RF    Climbing 3-5 steps with a railing? 2  - 3  -RF    To walk in hospital room? 2  - 2  -RF    AM-PAC 6 Clicks Score (PT) 16  - 17  -RF    Highest Level of Mobility Goal Stand (1 or More Minutes)-5  - Stand (1 or More Minutes)-5  -RF      Row Name 07/27/25 1605          Functional Assessment    Outcome Measure Options AM-PAC 6 Clicks Basic Mobility (PT)  -               User Key  (r) = Recorded By, (t) = Taken By, (c) = Cosigned By      Initials Name Provider Type    Ailyn Mcneil, PT Physical Therapist    Soledad Siddiqi, RN Registered Nurse                                  Physical Therapy Education       Title: PT OT SLP Therapies (In Progress)       Topic: Physical Therapy (In Progress)       Point: Mobility training (In Progress)       Learning Progress Summary            Patient Acceptance, TB,E,D, NL,NR by  at 7/27/2025 1606                      Point: Home exercise program (In Progress)       Learning Progress Summary            Patient Acceptance, TB,E,D, NL,NR by  at 7/27/2025 1606                      Point: Body mechanics (In Progress)       Learning Progress Summary            Patient Acceptance, TB,E,D, NL,NR by  at 7/27/2025 1606                      Point: Precautions (In Progress)       Learning Progress Summary            Patient Acceptance, TB,E,D, NL,NR by  at 7/27/2025 1606                                      User Key       Initials Effective Dates Name Provider Type Discipline     04/08/22 -  Ailyn Masters, PT Physical Therapist PT                  PT Recommendation and Plan  Planned Therapy Interventions (PT): balance training, bed mobility training, gait training, home exercise program, patient/family education, ROM (range of motion), stair training, strengthening, stretching, transfer training  Outcome Evaluation: Pt is an 85 yo M admitted from home after being found down by his neighbor - apparently has had multiple falls recently. Work-up revealed SAH and facial trauma with lacerations. Pt also with LLE cellulitis. Pt lives alone, AO to self only on eval so unable to provide history. Pt presents to PT with impaired strength, endurance, and cognition limiting overall mobility. Pt transferred to EOB with min A x1, STS with min A x2 and took several side steps  towards HOB with min-mod A x2 and rwx. Pt requiring heavy sequencing cues and assist with rwx, posterior lean with generalized unsteadiness and unsafe to progress gait away from bed. Pt returned to supine min A x1 and repositioned in bed, left with needs met. PT will continue to follow,  anticipate DC to SNF.     Time Calculation:   PT Evaluation Complexity  History, PT Evaluation Complexity: 1-2 personal factors and/or comorbidities  Examination of Body Systems (PT Eval Complexity): total of 3 or more elements  Clinical Presentation (PT Evaluation Complexity): evolving  Clinical Decision Making (PT Evaluation Complexity): moderate complexity  Overall Complexity (PT Evaluation Complexity): moderate complexity     PT Charges       Row Name 07/27/25 1606             Time Calculation    Start Time 1515  -      Stop Time 1535  -      Time Calculation (min) 20 min  -      PT Received On 07/27/25  -      PT - Next Appointment 07/28/25  -      PT Goal Re-Cert Due Date 08/03/25  -         Time Calculation- PT    Total Timed Code Minutes- PT 15 minute(s)  -         Timed Charges    22866 - PT Therapeutic Activity Minutes 15  -         Total Minutes    Timed Charges Total Minutes 15  -       Total Minutes 15  -BH                User Key  (r) = Recorded By, (t) = Taken By, (c) = Cosigned By      Initials Name Provider Type     Ailyn Masters, PT Physical Therapist                  Therapy Charges for Today       Code Description Service Date Service Provider Modifiers Qty    74682403089 HC PT THERAPEUTIC ACT EA 15 MIN 7/27/2025 Ailyn Masters, PT GP 1    97966630895 HC PT EVAL MOD COMPLEXITY 3 7/27/2025 Ailyn Masters, PT GP 1    55979932798 HC PT THER SUPP EA 15 MIN 7/27/2025 Ailyn Masters, PT GP 1            PT G-Codes  Outcome Measure Options: AM-PAC 6 Clicks Basic Mobility (PT)  AM-PAC 6 Clicks Score (PT): 16  PT Discharge Summary  Anticipated Discharge Disposition (PT): skilled nursing facility    Ailyn Masters PT  7/27/2025

## 2025-07-27 NOTE — PROGRESS NOTES
"    DAILY PROGRESS NOTE  [facility]    Patient Identification:  Name: Devin Scales  Age: 84 y.o.  Sex: male  :  1940  MRN: [id number]         Primary Care Physician: Meron Starks DO    Subjective:  Interval History: Feeling better today.  I do not think he is the best of historian at times as I would not doubt underlying aspects of dementia.  He is calm and conversational.  He denies any fever nausea or vomiting to me.    Objective: Elderly frail with a body habitus nearly resembling cachexia    Scheduled Meds:arformoterol, 15 mcg, Nebulization, BID - RT  aspirin, 81 mg, Oral, Daily  ceFAZolin, 2,000 mg, Intravenous, Q12H  mupirocin, 1 Application, Each Nare, BID  senna-docusate sodium, 2 tablet, Oral, BID      Continuous Infusions:sodium chloride, 100 mL/hr, Last Rate: 100 mL/hr (25 1051)        Vital signs in last 24 hours:  Temp:  [97.5 °F (36.4 °C)-98.7 °F (37.1 °C)] 98.7 °F (37.1 °C)  Heart Rate:  [] 104  Resp:  [16-21] 20  BP: (136-184)/() 147/81    Intake/Output:    Intake/Output Summary (Last 24 hours) at 2025 1109  Last data filed at 2025 1737  Gross per 24 hour   Intake 1200 ml   Output --   Net 1200 ml       Exam:  /81 (BP Location: Right arm, Patient Position: Lying)   Pulse 104   Temp 98.7 °F (37.1 °C) (Oral)   Resp 20   Ht 172 cm (67.72\")   Wt 58 kg (127 lb 13.9 oz)   SpO2 97%   BMI 19.60 kg/m²     General Appearance:    Alert, cooperative, not exactly thriving,                          Head:    Normocephalic, bitemporal wasting, forehead sutures with good hemostasis                           Eyes:    PERRLA/EOM's intact, both eyes                         Throat: Mucous membranes still quite dry                           Neck:   Supple, no rigidity or JVD                         Lungs:    Clear to auscultation bilaterally, respirations unlabored                 Chest Wall:    No tenderness or [id number] outlined                  "         Heart:    Regular rate and rhythm, S1 and S2 normal                  Abdomen:     Soft, nontender, bowel sounds active                 Extremities: Previous erythema tracings are definitely showing regression.  Blistering lesions to dorsum of foot also improving.  Nothing streaking up the leg consistent with lymphangitis                        Pulses:   Pulses palpable in lower extremities                  Neurologic:   CNII-XII intact, moving all     Data Review:  Labs in chart were reviewed.    Assessment:  Active Hospital Problems    Diagnosis  POA    **Subarachnoid hematoma [S06.6XAA]  Yes    Cellulitis of left lower extremity [L03.116]  Unknown    SAH (subarachnoid hemorrhage) [I60.9]  Yes    Cigarette smoker [F17.210]  Yes    COPD (chronic obstructive pulmonary disease) [J44.9]  Yes    Peripheral vascular disease [I73.9]  Yes      Resolved Hospital Problems   No resolved problems to display.       Plan:    After observing this patient's left lower extremity over the last 24 hours, perhaps this is cellulitis of the left lower extremity secondary to poor foot hygiene as previous erythema marks outlined in tracing are showing regression over the last 24 hours   - No aspects of ascending lymphangitis   - Continue cefazolin   - Complicated by PVD      Traumatic SAH with stable CTs per MARTHA and no plans for intervention with allowance to restart aspirin as of today 7/27/2025   - Secondary facial trauma with stitches noted to right forehead which will need removal about 5 days postplacement   - UDS negative as are alcohol levels      ARF versus CKD   - Creatinine axillary further increasing 2.9-3.5 with a uric of 6.0   - Nephrology following and await further recommendations   - Iron studies noted per Dr. Cortes      COPD without acute exacerbation on bronco dilators compounded by tobacco use    Elevated troponin secondary to troponin leak/CKD with no ACS noted on EKG   - Echo notes normal EF with normal  diastolic function with mild to moderate valvular disease    Severely malnourished with a prealbumin of 11 -nutrition to do assessment but very poor indicator for long-term success        Disposition: PT and OT to evaluate.  CCP is going to need to assist with DC needs as I am not sure this patient is safe in the home setting going forward?    Tramaine Mccloud MD  7/27/2025  11:09 EDT

## 2025-07-27 NOTE — CONSULTS
"Patient Name: Devin Scales  YOB: 1940  MRN: [id number]  Admission date: 7/25/2025  Reason for Encounter: MD Consult  and [facility] Clinical Nutrition Assessment     Subjective    Subjective Information     7/27: Pt is an 84 y.o. male with a history of COPD, HTN, and GERD who presented after a fall. Found to have SAH. Pt reports he thinks his wt is stable. Will f/u for full nutrition interview and NFPE.     Objective   H&P and Current Problems      H&P  Past Medical History:   Diagnosis Date    Benign essential tremor     BPH (benign prostatic hyperplasia)     Chest pain     Dyspnea     Fatigue     Hyperlipidemia       Past Surgical History:   Procedure Laterality Date    CARDIAC CATHETERIZATION N/A 2/13/2018    Procedure: Left Heart Cath;  Surgeon: Emleia Arellano MD;  Location:  [facility];  Service:     CARDIAC CATHETERIZATION N/A 2/13/2018    Procedure: Coronary angiography;  Surgeon: Emelia Arellano MD;  Location:  [facility];  Service:     CARDIAC CATHETERIZATION N/A 2/13/2018    Procedure: Left ventriculography;  Surgeon: Emelia Arellano MD;  Location:  [facility];  Service:       Current Problems   Admission Diagnosis:  SAH (subarachnoid hemorrhage) [I60.9]  STEVENSON (acute kidney injury) [N17.9]  Subarachnoid hematoma [S06.6XAA]  Laceration of forehead, initial encounter [S01.81XA]    Problem List:    Subarachnoid hematoma    SAH (subarachnoid hemorrhage)      Other Applicable Nutrition Information:        Anthropometrics       Height: 172 cm (67.72\")  Weight: 58 kg (127 lb 13.9 oz) (07/26/25 0920)  Weight Method: Bed scale  BMI (Calculated): 19.6       Trending Weight Changes 07/27/25:No significant changes       Weight History  Wt Readings from Last 20 Encounters:   07/26/25 0920 58 kg (127 lb 13.9 oz)   07/26/25 0001 58 kg (127 lb 13.9 oz)   07/25/25 1744 58 kg (127 lb 13.9 oz)   05/31/23 2010 61.5 kg (135 lb 9.6 oz)   05/31/23 " "1601 59.9 kg (132 lb)   01/16/23 1050 61.2 kg (135 lb)   12/26/22 0500 62.8 kg (138 lb 7.2 oz)   02/13/18 0658 67.1 kg (148 lb)        Labs      Comment: Reviewed, management per attending.     Results from last 7 days   Lab Units 07/27/25  0611 07/26/25  0627 07/25/25  1435   SODIUM mmol/L 142 144 141   POTASSIUM mmol/L 4.2 4.7 5.0   GLUCOSE mg/dL 130* 100* 97   BUN mg/dL 33.0* 34.0* 34.0*   CREATININE mg/dL 3.51* 2.93* 3.05*   CALCIUM mg/dL 8.9 8.9 9.2   PHOSPHORUS mg/dL 3.7 4.8*  --    MAGNESIUM mg/dL  --  2.1  --    ALBUMIN g/dL 3.5  --  3.4*   PREALBUMIN mg/dL 11.6*  --   --    BILIRUBIN mg/dL  --   --  0.5   ALK PHOS U/L  --   --  64   AST (SGOT) U/L  --   --  23   ALT (SGPT) U/L  --   --  19   PROBNP pg/mL  --   --  579.0     Results from last 7 days   Lab Units 07/27/25  0611 07/26/25  0627 07/25/25  1435   PLATELETS 10*3/mm3  --  266 286   HEMOGLOBIN g/dL  --  10.8* 10.5*   HEMATOCRIT %  --  32.1* 31.5*   IRON mcg/dL 38*  --   --      No results found for: \"HGBA1C\"   Results from last 7 days   Lab Units 07/27/25  0611   URIC ACID mg/dL 6.0     Medications       Scheduled Medications arformoterol, 15 mcg, Nebulization, BID - RT  ceFAZolin, 2,000 mg, Intravenous, Q12H  mupirocin, 1 Application, Each Nare, BID  senna-docusate sodium, 2 tablet, Oral, BID        Infusions sodium chloride, 100 mL/hr, Last Rate: 100 mL/hr (07/27/25 0135)        PRN Medications   acetaminophen **OR** acetaminophen    aluminum-magnesium hydroxide-simethicone    senna-docusate sodium **AND** polyethylene glycol **AND** bisacodyl **AND** bisacodyl    Calcium Replacement - Follow Nurse / BPA Driven Protocol    fentaNYL citrate (PF)    hydrALAZINE    Magnesium Standard Dose Replacement - Follow Nurse / BPA Driven Protocol    nitroglycerin    ondansetron ODT **OR** ondansetron    Phosphorus Replacement - Follow Nurse / BPA Driven Protocol    Potassium Replacement - Follow Nurse / BPA Driven Protocol     Physical Findings  "     Chewing/Swallowing  Teeth Status: Mouth/Teeth WDL: .WDL except, teeth Teeth Symptoms: tooth/teeth missing, decay/caries  Chewing/Swallowing Issues: No issues identified at this time   Edema            Leg, Left Edema: 2+ (Mild) Leg, Right Edema: 1+ (Trace)  Ankle, Left Edema: 2+ (Mild) Ankle, Right Edema: 1+ (Trace)  Foot, Left Edema: 2+ (Mild) Foot, Right Edema: 1+ (Trace)   Bowel Function  Stool Output  Perineal Care: absorbent brief/pad changed (07/26/25 2341)  Last Bowel Movement: 07/25/25   I/Os  Intake & Output (last 3 days)         07/24 0701 07/25 0700 07/25 0701 07/26 0700 07/26 0701 07/27 0700 07/27 0701  07/28 0700    P.O.   1200     Total Intake(mL/kg)   1200 (20.7)     Urine (mL/kg/hr)  2925      Total Output  2925      Net  -2925 +1200             Urine Unmeasured Occurrence   1 x            Lines, Drains, Airways, & Wounds       Active LDAs       Name Placement date Placement time Site Days Last dressing change    Peripheral IV 07/25/25 1435 20 G Anterior;Left Forearm 07/25/25  1435  Forearm  1     Peripheral IV 07/25/25 1621 20 G Anterior;Right Forearm 07/25/25  1621  Forearm  1     Wound 12/25/22 1721 Right upper arm 12/25/22  1721  -- 944                       Nutrition Focused Physical Exam     Trending NFPE 07/27/25: Unable to assess in person on this date, RD to f/u for NFPE     Malnutrition Severity Assessment            (1)   Current Nutrition Orders & Evaluation of Intake      Oral Nutrition     Food Allergies  and Intolerances NKFA   Current PO Diet Diet: Regular/House; Fluid Consistency: Thin (IDDSI 0)   Oral Nutrition Supplement None     Trending % PO Intake 07/27/25: 50% x 2 meals   (2)  Assessment & Plan   Nutrition Diagnosis and Goals       Nutrition Diagnosis 1 Inadequate Oral Intake related to SAH, COPD, hypertension, and GERD as evidence by po intakes.        Goal(s) Increase PO Intake , Accepts Oral Nutrition Supplement, and No Significant Weight Loss     Nutrition  Intervention and Prescription       Intervention  Start oral nutrition supplement, will f/u for full nutrition interview/NFPE      Diet Prescription     Supplement Prescription Boost Original BID    Education Provided     (3)  Monitoring/Evaluation       Monitor/Evaluation Per Protocol, PO Intake, Oral Nutrition Supplement Intake, and Weight     RD Follow-Up Encounter 1-2 days and prn     Electronically signed by:  Adeola Rowley RD  07/27/25 08:44 EDT

## 2025-07-27 NOTE — PLAN OF CARE
Problem: Violence Risk or Actual  Goal: Anger and Impulse Control  Outcome: Progressing  Intervention: Minimize Safety Risk  Recent Flowsheet Documentation  Taken 7/26/2025 2055 by Mariaelena Rivero RN  Sensory Stimulation Regulation: care clustered  De-Escalation Techniques: stimulation decreased  Enhanced Safety Measures: bed alarm set     Problem: Adult Inpatient Plan of Care  Goal: Plan of Care Review  Outcome: Progressing  Goal: Patient-Specific Goal (Individualized)  Outcome: Progressing  Goal: Absence of Hospital-Acquired Illness or Injury  Outcome: Progressing  Intervention: Identify and Manage Fall Risk  Recent Flowsheet Documentation  Taken 7/27/2025 0400 by Mariaelena Rivero RN  Safety Promotion/Fall Prevention:   activity supervised   safety round/check completed   room organization consistent   nonskid shoes/slippers when out of bed  Taken 7/26/2025 2055 by Mariaelena Rivero RN  Safety Promotion/Fall Prevention:   activity supervised   room organization consistent   safety round/check completed  Intervention: Prevent Skin Injury  Recent Flowsheet Documentation  Taken 7/27/2025 0400 by Mariaelena Rivero RN  Skin Protection: incontinence pads utilized  Taken 7/26/2025 2055 by Mariaelena Rivero RN  Skin Protection: incontinence pads utilized  Intervention: Prevent and Manage VTE (Venous Thromboembolism) Risk  Recent Flowsheet Documentation  Taken 7/26/2025 2055 by Mariaelena Rivero RN  VTE Prevention/Management: SCDs (sequential compression devices) on  Intervention: Prevent Infection  Recent Flowsheet Documentation  Taken 7/27/2025 0400 by Mariaelena Rivero RN  Infection Prevention: single patient room provided  Taken 7/26/2025 2055 by Mariaelena Rivero RN  Infection Prevention: single patient room provided  Goal: Optimal Comfort and Wellbeing  Outcome: Progressing  Intervention: Provide Person-Centered Care  Recent Flowsheet Documentation  Taken 7/26/2025 2055 by Mariaelena Rivero RN  Trust Relationship/Rapport: care  explained  Goal: Readiness for Transition of Care  Outcome: Progressing     Problem: Fall Injury Risk  Goal: Absence of Fall and Fall-Related Injury  Outcome: Progressing  Intervention: Identify and Manage Contributors  Recent Flowsheet Documentation  Taken 7/26/2025 2055 by Mariaelena Rivero RN  Medication Review/Management: medications reviewed  Intervention: Promote Injury-Free Environment  Recent Flowsheet Documentation  Taken 7/27/2025 0400 by Mariaelena Rivero RN  Safety Promotion/Fall Prevention:   activity supervised   safety round/check completed   room organization consistent   nonskid shoes/slippers when out of bed  Taken 7/26/2025 2055 by Mariaelena Rivero RN  Safety Promotion/Fall Prevention:   activity supervised   room organization consistent   safety round/check completed     Problem: Skin Injury Risk Increased  Goal: Skin Health and Integrity  Outcome: Progressing  Intervention: Optimize Skin Protection  Recent Flowsheet Documentation  Taken 7/27/2025 0400 by Mariaelena Rivero RN  Pressure Reduction Techniques: frequent weight shift encouraged  Pressure Reduction Devices: pressure-redistributing mattress utilized  Skin Protection: incontinence pads utilized  Taken 7/26/2025 2341 by Mariaelena Rivero RN  Pressure Reduction Devices: pressure-redistributing mattress utilized  Taken 7/26/2025 2055 by Mariaelena Rivero RN  Pressure Reduction Techniques: frequent weight shift encouraged  Pressure Reduction Devices: pressure-redistributing mattress utilized  Skin Protection: incontinence pads utilized     Problem: Comorbidity Management  Goal: Blood Pressure in Desired Range  Outcome: Progressing  Intervention: Maintain Blood Pressure Management  Recent Flowsheet Documentation  Taken 7/26/2025 2055 by Mariaelena Rivero RN  Medication Review/Management: medications reviewed   Goal Outcome Evaluation:

## 2025-07-27 NOTE — PROGRESS NOTES
Appreciate [facility] taking over care.  Intensivist team will sign off.    Electronically signed by Eze Cortes MD, 07/27/25, 7:12 AM EDT.

## 2025-07-27 NOTE — PLAN OF CARE
Goal Outcome Evaluation:  Plan of Care Reviewed With: patient           Outcome Evaluation: Pt is an 83 yo M admitted from home after being found down by his neighbor - apparently has had multiple falls recently. Work-up revealed SAH and facial trauma with lacerations. Pt also with LLE cellulitis. Pt lives alone, AO to self only on eval so unable to provide history. Pt presents to PT with impaired strength, endurance, and cognition limiting overall mobility. Pt transferred to EOB with min A x1, STS with min A x2 and took several side steps  towards HOB with min-mod A x2 and rwx. Pt requiring heavy sequencing cues and assist with rwx, posterior lean with generalized unsteadiness and unsafe to progress gait away from bed. Pt returned to supine min A x1 and repositioned in bed, left with needs met. PT will continue to follow, anticipate DC to SNF.    Anticipated Discharge Disposition (PT): skilled nursing facility

## 2025-07-27 NOTE — PLAN OF CARE
Problem: Violence Risk or Actual  Goal: Anger and Impulse Control  Outcome: Progressing  Intervention: Minimize Safety Risk  Recent Flowsheet Documentation  Taken 7/27/2025 1800 by Soledad Pierce RN  Enhanced Safety Measures: bed alarm set  Taken 7/27/2025 1605 by Soledad Pierce RN  Enhanced Safety Measures: bed alarm set  Taken 7/27/2025 1420 by Soledad Pierce RN  Enhanced Safety Measures: bed alarm set  Taken 7/27/2025 1200 by Soledad Pierce RN  Enhanced Safety Measures: bed alarm set  Taken 7/27/2025 1000 by Soledad Pierce RN  Enhanced Safety Measures: bed alarm set  Taken 7/27/2025 0820 by Soledad Pierce RN  Enhanced Safety Measures: bed alarm set     Problem: Adult Inpatient Plan of Care  Goal: Plan of Care Review  Outcome: Progressing  Flowsheets (Taken 7/27/2025 1851)  Outcome Evaluation: VSS. AOXself. straight cath needed. Bladder scan recommended every 4 hours.  Goal: Patient-Specific Goal (Individualized)  Outcome: Progressing  Goal: Absence of Hospital-Acquired Illness or Injury  Outcome: Progressing  Intervention: Identify and Manage Fall Risk  Recent Flowsheet Documentation  Taken 7/27/2025 1800 by Soledad Pierce RN  Safety Promotion/Fall Prevention: safety round/check completed  Taken 7/27/2025 1605 by Soledad Pierce RN  Safety Promotion/Fall Prevention: safety round/check completed  Taken 7/27/2025 1420 by Soledad Pierce RN  Safety Promotion/Fall Prevention: safety round/check completed  Taken 7/27/2025 1200 by Soledad Pierce RN  Safety Promotion/Fall Prevention: safety round/check completed  Taken 7/27/2025 1000 by Soledad Pierce RN  Safety Promotion/Fall Prevention: safety round/check completed  Taken 7/27/2025 0820 by Soledad Pierce RN  Safety Promotion/Fall Prevention: safety round/check completed  Intervention: Prevent Skin Injury  Recent Flowsheet Documentation  Taken 7/27/2025 1800 by Soledad Pierce RN  Body Position: position changed  independently  Taken 7/27/2025 1605 by Soledad Pierce RN  Body Position: position changed independently  Taken 7/27/2025 1420 by Soledad Pierce RN  Body Position: position changed independently  Skin Protection: incontinence pads utilized  Taken 7/27/2025 1200 by Soledad Pierce RN  Body Position: position changed independently  Taken 7/27/2025 1000 by Soledad Pierce RN  Body Position: position changed independently  Taken 7/27/2025 0820 by Soledad Pierce RN  Body Position: position changed independently  Skin Protection: incontinence pads utilized  Intervention: Prevent and Manage VTE (Venous Thromboembolism) Risk  Recent Flowsheet Documentation  Taken 7/27/2025 1420 by Soledad Pierce RN  VTE Prevention/Management: SCDs (sequential compression devices) off  Taken 7/27/2025 0820 by Soledad Pierce RN  VTE Prevention/Management:   bilateral   SCDs (sequential compression devices) off  Intervention: Prevent Infection  Recent Flowsheet Documentation  Taken 7/27/2025 1800 by Soledad Pierce RN  Infection Prevention: single patient room provided  Taken 7/27/2025 1605 by Soledad Pierce RN  Infection Prevention: single patient room provided  Taken 7/27/2025 1420 by Soledad Pierce RN  Infection Prevention: single patient room provided  Taken 7/27/2025 1000 by Soledad Pierce RN  Infection Prevention: single patient room provided  Taken 7/27/2025 0820 by Soledad Pierce RN  Infection Prevention: single patient room provided  Goal: Optimal Comfort and Wellbeing  Outcome: Progressing  Intervention: Monitor Pain and Promote Comfort  Recent Flowsheet Documentation  Taken 7/27/2025 1800 by Soledad Pierce RN  Pain Management Interventions: pain medication given  Intervention: Provide Person-Centered Care  Recent Flowsheet Documentation  Taken 7/27/2025 1420 by Soledad Pierce RN  Trust Relationship/Rapport:   care explained   choices provided  Taken 7/27/2025 0820 by Soledad Pierce  INA Marie  Trust Relationship/Rapport:   care explained   choices provided  Goal: Readiness for Transition of Care  Outcome: Progressing     Problem: Fall Injury Risk  Goal: Absence of Fall and Fall-Related Injury  Outcome: Progressing  Intervention: Identify and Manage Contributors  Recent Flowsheet Documentation  Taken 7/27/2025 1800 by Soledad Pierce RN  Medication Review/Management: medications reviewed  Taken 7/27/2025 1605 by Soledad Pierce RN  Medication Review/Management: medications reviewed  Taken 7/27/2025 1420 by Soledad Pierce RN  Medication Review/Management: medications reviewed  Taken 7/27/2025 1200 by Soledad Pierce RN  Medication Review/Management: medications reviewed  Taken 7/27/2025 1000 by Soledad Pierce RN  Medication Review/Management: medications reviewed  Taken 7/27/2025 0820 by Soledad Pierce RN  Medication Review/Management: medications reviewed  Intervention: Promote Injury-Free Environment  Recent Flowsheet Documentation  Taken 7/27/2025 1800 by Soledad Pierce RN  Safety Promotion/Fall Prevention: safety round/check completed  Taken 7/27/2025 1605 by Soledad Pierce RN  Safety Promotion/Fall Prevention: safety round/check completed  Taken 7/27/2025 1420 by Soledad Pierce RN  Safety Promotion/Fall Prevention: safety round/check completed  Taken 7/27/2025 1200 by Soledad Pierce RN  Safety Promotion/Fall Prevention: safety round/check completed  Taken 7/27/2025 1000 by Soledad Pierce RN  Safety Promotion/Fall Prevention: safety round/check completed  Taken 7/27/2025 0820 by Soledad Pierce RN  Safety Promotion/Fall Prevention: safety round/check completed     Problem: Skin Injury Risk Increased  Goal: Skin Health and Integrity  Outcome: Progressing  Intervention: Optimize Skin Protection  Recent Flowsheet Documentation  Taken 7/27/2025 1800 by Soledad Pierce RN  Head of Bed (HOB) Positioning: HOB elevated  Taken 7/27/2025 1605 by Kari  Soledad Marie RN  Head of Bed ([facility]) Positioning: HOB elevated  Taken 7/27/2025 1420 by Soledad Pierce RN  Pressure Reduction Techniques: frequent weight shift encouraged  Head of Bed (HOB) Positioning: HOB elevated  Pressure Reduction Devices: pressure-redistributing mattress utilized  Skin Protection: incontinence pads utilized  Taken 7/27/2025 1200 by Soledad Pierce RN  Head of Bed ([facility]) Positioning: HOB elevated  Taken 7/27/2025 1000 by Soledad Pierce RN  Head of Bed ([facility]) Positioning: HOB elevated  Taken 7/27/2025 0820 by Soledad Pierce RN  Pressure Reduction Techniques: frequent weight shift encouraged  Head of Bed ([facility]) Positioning: HOB elevated  Pressure Reduction Devices: pressure-redistributing mattress utilized  Skin Protection: incontinence pads utilized     Problem: Comorbidity Management  Goal: Blood Pressure in Desired Range  Outcome: Progressing  Intervention: Maintain Blood Pressure Management  Recent Flowsheet Documentation  Taken 7/27/2025 1800 by Soledad Pierce RN  Medication Review/Management: medications reviewed  Taken 7/27/2025 1605 by Soledad Pierce RN  Medication Review/Management: medications reviewed  Taken 7/27/2025 1420 by Soledad Pierce RN  Medication Review/Management: medications reviewed  Taken 7/27/2025 1200 by Soledad Pierce RN  Medication Review/Management: medications reviewed  Taken 7/27/2025 1000 by Soledad Pierce RN  Medication Review/Management: medications reviewed  Taken 7/27/2025 0820 by Soledad Pierce RN  Medication Review/Management: medications reviewed   Goal Outcome Evaluation:              Outcome Evaluation: VSS. AOXself. straight cath needed. Bladder scan recommended every 4 hours.

## 2025-07-28 LAB
ALBUMIN SERPL-MCNC: 2.8 G/DL (ref 3.5–5.2)
ANION GAP SERPL CALCULATED.3IONS-SCNC: 12.8 MMOL/L (ref 5–15)
BUN SERPL-MCNC: 29 MG/DL (ref 8–23)
BUN/CREAT SERPL: 10.1 (ref 7–25)
CALCIUM SPEC-SCNC: 8.4 MG/DL (ref 8.6–10.5)
CHLORIDE SERPL-SCNC: 109 MMOL/L (ref 98–107)
CO2 SERPL-SCNC: 23.2 MMOL/L (ref 22–29)
CREAT SERPL-MCNC: 2.86 MG/DL (ref 0.76–1.27)
CREAT UR-MCNC: 36.5 MG/DL
EGFRCR SERPLBLD CKD-EPI 2021: 21.3 ML/MIN/1.73
FERRITIN SERPL-MCNC: 413 NG/ML (ref 30–400)
GLUCOSE SERPL-MCNC: 112 MG/DL (ref 65–99)
PHOSPHATE SERPL-MCNC: 4.1 MG/DL (ref 2.5–4.5)
POTASSIUM SERPL-SCNC: 4.1 MMOL/L (ref 3.5–5.2)
SODIUM SERPL-SCNC: 145 MMOL/L (ref 136–145)
SODIUM UR-SCNC: 90 MMOL/L

## 2025-07-28 PROCEDURE — 94664 DEMO&/EVAL PT USE INHALER: CPT

## 2025-07-28 PROCEDURE — 94760 N-INVAS EAR/PLS OXIMETRY 1: CPT

## 2025-07-28 PROCEDURE — 82570 ASSAY OF URINE CREATININE: CPT | Performed by: INTERNAL MEDICINE

## 2025-07-28 PROCEDURE — 97530 THERAPEUTIC ACTIVITIES: CPT

## 2025-07-28 PROCEDURE — 94761 N-INVAS EAR/PLS OXIMETRY MLT: CPT

## 2025-07-28 PROCEDURE — 80069 RENAL FUNCTION PANEL: CPT | Performed by: INTERNAL MEDICINE

## 2025-07-28 PROCEDURE — 82728 ASSAY OF FERRITIN: CPT | Performed by: INTERNAL MEDICINE

## 2025-07-28 PROCEDURE — 97535 SELF CARE MNGMENT TRAINING: CPT

## 2025-07-28 PROCEDURE — 97166 OT EVAL MOD COMPLEX 45 MIN: CPT

## 2025-07-28 PROCEDURE — 84300 ASSAY OF URINE SODIUM: CPT | Performed by: INTERNAL MEDICINE

## 2025-07-28 PROCEDURE — 94799 UNLISTED PULMONARY SVC/PX: CPT

## 2025-07-28 PROCEDURE — 25010000002 CEFAZOLIN PER 500 MG: Performed by: HOSPITALIST

## 2025-07-28 RX ORDER — TAMSULOSIN HYDROCHLORIDE 0.4 MG/1
0.4 CAPSULE ORAL DAILY
Status: DISCONTINUED | OUTPATIENT
Start: 2025-07-28 | End: 2025-08-01 | Stop reason: HOSPADM

## 2025-07-28 RX ORDER — SODIUM CHLORIDE 450 MG/100ML
75 INJECTION, SOLUTION INTRAVENOUS CONTINUOUS
Status: DISCONTINUED | OUTPATIENT
Start: 2025-07-28 | End: 2025-07-29

## 2025-07-28 RX ADMIN — ASPIRIN 81 MG: 81 TABLET, COATED ORAL at 08:27

## 2025-07-28 RX ADMIN — CEFAZOLIN 2000 MG: 2 INJECTION, POWDER, FOR SOLUTION INTRAMUSCULAR; INTRAVENOUS at 16:01

## 2025-07-28 RX ADMIN — CEFAZOLIN 2000 MG: 2 INJECTION, POWDER, FOR SOLUTION INTRAMUSCULAR; INTRAVENOUS at 05:49

## 2025-07-28 RX ADMIN — ARFORMOTEROL TARTRATE 15 MCG: 15 SOLUTION RESPIRATORY (INHALATION) at 08:17

## 2025-07-28 RX ADMIN — SENNOSIDES AND DOCUSATE SODIUM 2 TABLET: 50; 8.6 TABLET ORAL at 21:58

## 2025-07-28 RX ADMIN — ARFORMOTEROL TARTRATE 15 MCG: 15 SOLUTION RESPIRATORY (INHALATION) at 22:10

## 2025-07-28 RX ADMIN — TAMSULOSIN HYDROCHLORIDE 0.4 MG: 0.4 CAPSULE ORAL at 12:41

## 2025-07-28 RX ADMIN — MUPIROCIN 1 APPLICATION: 20 OINTMENT TOPICAL at 20:21

## 2025-07-28 RX ADMIN — SODIUM CHLORIDE 75 ML/HR: 450 INJECTION, SOLUTION INTRAVENOUS at 16:01

## 2025-07-28 RX ADMIN — SENNOSIDES AND DOCUSATE SODIUM 2 TABLET: 50; 8.6 TABLET ORAL at 08:27

## 2025-07-28 RX ADMIN — MUPIROCIN 1 APPLICATION: 20 OINTMENT TOPICAL at 08:27

## 2025-07-28 RX ADMIN — ACETAMINOPHEN 650 MG: 325 TABLET, FILM COATED ORAL at 22:09

## 2025-07-28 NOTE — PROGRESS NOTES
[facility] Progress Note      Patient Name: Devin Scales  : 1940  MRN: [id number]  Primary Care Physician:  Meron Starks DO  Date of admission: 2025    Subjective     Interval History:   Follow-up on STEVENSON versus STEVENSON/CKD    Confusion continues but less than yesterday; cannot tell me month or year  No shortness of breath on room air lying flat  Appetite excellent without N/V  Positive PVRs prompting Moctezuma catheter placement today  UOP yesterday 3.85 L    Review of Systems:   As noted above    Objective     Vitals:   Temp:  [97.3 °F (36.3 °C)-98.2 °F (36.8 °C)] 97.3 °F (36.3 °C)  Heart Rate:  [72-94] 81  Resp:  [18-20] 20  BP: (120-134)/(63-74) 121/63    Intake/Output Summary (Last 24 hours) at 2025 1526  Last data filed at 2025 1321  Gross per 24 hour   Intake 600 ml   Output 3850 ml   Net -3250 ml       Physical Exam:    Constitutional: Awake, NAD, chr ill, frail, gaunt, not oriented  HEENT: Sclera anicteric, no conjunctival injection, sutured laceration above right brow  Neck: Supple, no carotid bruit, trachea at midline, no JVD  Respiratory: Coarse bilaterally, nonlabored on RA  Cardiovascular: RRR, no rub  Gastrointestinal: BS +, distended, soft, nontender   : Moctezuma catheter in place  Musculoskeletal: Trace edema, no clubbing or cyanosis  Psychiatric: Blunted sensorium, cooperative, limited recall of recent events  Neurologic: No asterixis, moving all extremities, disorganized speech.    Skin: Warm and dry; erythema and blister left lower leg/foot       Scheduled Meds:     arformoterol, 15 mcg, Nebulization, BID - RT  aspirin, 81 mg, Oral, Daily  ceFAZolin, 2,000 mg, Intravenous, Q12H  mupirocin, 1 Application, Each Nare, BID  senna-docusate sodium, 2 tablet, Oral, BID  tamsulosin, 0.4 mg, Oral, Daily      IV Meds:   sodium chloride, 75 mL/hr, Last Rate: 75 mL/hr (25 0108)        Results Reviewed:   I have personally reviewed the results from the time  of this admission to 7/28/2025 15:26 EDT     Results from last 7 days   Lab Units 07/28/25  0835 07/27/25  0611 07/26/25  0627 07/25/25  1435   SODIUM mmol/L 145 142 144 141   POTASSIUM mmol/L 4.1 4.2 4.7 5.0   CHLORIDE mmol/L 109* 106 108* 104   CO2 mmol/L 23.2 20.0* 24.0 26.0   BUN mg/dL 29.0* 33.0* 34.0* 34.0*   CREATININE mg/dL 2.86* 3.51* 2.93* 3.05*   CALCIUM mg/dL 8.4* 8.9 8.9 9.2   BILIRUBIN mg/dL  --   --   --  0.5   ALK PHOS U/L  --   --   --  64   ALT (SGPT) U/L  --   --   --  19   AST (SGOT) U/L  --   --   --  23   GLUCOSE mg/dL 112* 130* 100* 97       Estimated Creatinine Clearance: 16.3 mL/min (A) (by C-G formula based on SCr of 2.86 mg/dL (H)).    Results from last 7 days   Lab Units 07/28/25  0835 07/27/25  0611 07/26/25  0627   MAGNESIUM mg/dL  --   --  2.1   PHOSPHORUS mg/dL 4.1 3.7 4.8*       Results from last 7 days   Lab Units 07/27/25  0611 07/26/25  2111   URIC ACID mg/dL 6.0 6.3       Results from last 7 days   Lab Units 07/26/25  0627 07/25/25  1435   WBC 10*3/mm3 9.19 10.10   HEMOGLOBIN g/dL 10.8* 10.5*   PLATELETS 10*3/mm3 266 286       Results from last 7 days   Lab Units 07/26/25  0627   INR  1.21*       Assessment / Plan     ASSESSMENT:  STEVENSON versus STEVENSON/CKD, nonoliguric, improving.  Likely prerenal --> ATN from hypovolemia and hypotension, and postobstructive uropathy (now with Moctezuma catheter).  Electrolytes fine.  Has mild ankle edema but breathing comfortably on RA lying flat.  Urinalysis negative for blood and protein  Dysuria/BPH, Moctezuma catheter was anchored 7/28  Recent fall with subarachnoid hemorrhage; MARTHA recommends conservative treatment for now  Possible cellulitis left lower leg  COPD  Malnutrition  Anemia with low iron saturation    PLAN:  Continue IVF, though change to half liter normal saline, for now given postobstructive diuresis and soft BPs  IV iron once renal function has recovered further  Surveillance labs    Thank you for involving us in the care of Devin Scales.   Please feel free to call with any questions.    Merritt Sylvester MD  07/28/25  15:26 EDT    [facility]  [phone]    Please note that portions of this note were completed with a voice recognition program.

## 2025-07-28 NOTE — PLAN OF CARE
NATHAN Moctezuma placed today.   Problem: Violence Risk or Actual  Goal: Anger and Impulse Control  Outcome: Progressing  Intervention: Minimize Safety Risk  Recent Flowsheet Documentation  Taken 7/28/2025 1600 by Elsi Bell RN  Enhanced Safety Measures: bed alarm set  Taken 7/28/2025 1405 by Elsi Bell RN  Enhanced Safety Measures: bed alarm set  Taken 7/28/2025 1222 by Elsi Bell RN  Enhanced Safety Measures: chair alarm set  Taken 7/28/2025 1014 by Elsi Bell RN  Enhanced Safety Measures: chair alarm set  Taken 7/28/2025 0827 by Elsi Bell RN  Enhanced Safety Measures: bed alarm set     Problem: Adult Inpatient Plan of Care  Goal: Plan of Care Review  Outcome: Progressing  Goal: Patient-Specific Goal (Individualized)  Outcome: Progressing  Goal: Absence of Hospital-Acquired Illness or Injury  Outcome: Progressing  Intervention: Identify and Manage Fall Risk  Recent Flowsheet Documentation  Taken 7/28/2025 1600 by Elsi Bell RN  Safety Promotion/Fall Prevention:   clutter free environment maintained   safety round/check completed  Taken 7/28/2025 1405 by Elsi Bell RN  Safety Promotion/Fall Prevention:   clutter free environment maintained   safety round/check completed  Taken 7/28/2025 1222 by Elsi Bell RN  Safety Promotion/Fall Prevention:   clutter free environment maintained   safety round/check completed  Taken 7/28/2025 1014 by Elsi Bell RN  Safety Promotion/Fall Prevention:   clutter free environment maintained   safety round/check completed  Taken 7/28/2025 0827 by Elsi Bell RN  Safety Promotion/Fall Prevention:   clutter free environment maintained   safety round/check completed  Intervention: Prevent Skin Injury  Recent Flowsheet Documentation  Taken 7/28/2025 1600 by Elsi Bell RN  Body Position: position changed independently  Taken 7/28/2025 1405 by Elsi Bell RN  Body Position: position changed  independently  Taken 7/28/2025 1222 by Elsi Bell RN  Body Position: position changed independently  Taken 7/28/2025 1014 by Elsi Bell RN  Body Position: position maintained  Taken 7/28/2025 0827 by Elsi Bell RN  Body Position:   left   turned  Intervention: Prevent and Manage VTE (Venous Thromboembolism) Risk  Recent Flowsheet Documentation  Taken 7/28/2025 0827 by Elsi Bell RN  VTE Prevention/Management: SCDs (sequential compression devices) off  Intervention: Prevent Infection  Recent Flowsheet Documentation  Taken 7/28/2025 1600 by Elsi Bell RN  Infection Prevention:   environmental surveillance performed   single patient room provided  Taken 7/28/2025 1405 by Elis Bell RN  Infection Prevention:   environmental surveillance performed   single patient room provided  Taken 7/28/2025 1222 by Elsi Bell RN  Infection Prevention:   environmental surveillance performed   single patient room provided  Taken 7/28/2025 1014 by Elsi Bell RN  Infection Prevention:   environmental surveillance performed   single patient room provided  Taken 7/28/2025 0827 by Elsi Bell RN  Infection Prevention:   environmental surveillance performed   single patient room provided  Goal: Optimal Comfort and Wellbeing  Outcome: Progressing  Intervention: Provide Person-Centered Care  Recent Flowsheet Documentation  Taken 7/28/2025 0827 by Elsi Bell RN  Trust Relationship/Rapport:   care explained   thoughts/feelings acknowledged  Goal: Readiness for Transition of Care  Outcome: Progressing     Problem: Fall Injury Risk  Goal: Absence of Fall and Fall-Related Injury  Outcome: Progressing  Intervention: Promote Injury-Free Environment  Recent Flowsheet Documentation  Taken 7/28/2025 1600 by Elsi Bell RN  Safety Promotion/Fall Prevention:   clutter free environment maintained   safety round/check completed  Taken 7/28/2025 1405 by Elsi Bell  RN  Safety Promotion/Fall Prevention:   clutter free environment maintained   safety round/check completed  Taken 7/28/2025 1222 by Elsi Bell RN  Safety Promotion/Fall Prevention:   clutter free environment maintained   safety round/check completed  Taken 7/28/2025 1014 by Elsi Bell RN  Safety Promotion/Fall Prevention:   clutter free environment maintained   safety round/check completed  Taken 7/28/2025 0827 by Elsi Bell RN  Safety Promotion/Fall Prevention:   clutter free environment maintained   safety round/check completed     Problem: Skin Injury Risk Increased  Goal: Skin Health and Integrity  Outcome: Progressing  Intervention: Optimize Skin Protection  Recent Flowsheet Documentation  Taken 7/28/2025 1600 by Elsi Bell RN  Head of Bed (HOB) Positioning: HOB at 30 degrees  Taken 7/28/2025 1405 by Elsi Bell RN  Head of Bed (HOB) Positioning: HOB at 30 degrees  Taken 7/28/2025 1222 by Elsi Bell RN  Activity Management: up in chair  Taken 7/28/2025 1014 by Elsi Bell RN  Activity Management: up in chair  Taken 7/28/2025 0827 by Elsi eBll RN  Head of Bed (HOB) Positioning: HOB at 20 degrees     Problem: Comorbidity Management  Goal: Blood Pressure in Desired Range  Outcome: Progressing   Goal Outcome Evaluation:

## 2025-07-28 NOTE — PLAN OF CARE
Goal Outcome Evaluation:      Continues with urinary retention. I&O catfh x2 this shift. Bladder scan and output charted. Vss      Problem: Adult Inpatient Plan of Care  Goal: Plan of Care Review  Outcome: Progressing  Goal: Patient-Specific Goal (Individualized)  Outcome: Progressing  Goal: Absence of Hospital-Acquired Illness or Injury  Intervention: Identify and Manage Fall Risk  Recent Flowsheet Documentation  Taken 7/27/2025 2031 by Carol Jimenez RN  Safety Promotion/Fall Prevention:   clutter free environment maintained   fall prevention program maintained   nonskid shoes/slippers when out of bed   room organization consistent   safety round/check completed  Goal: Optimal Comfort and Wellbeing  Outcome: Progressing  Intervention: Provide Person-Centered Care  Recent Flowsheet Documentation  Taken 7/27/2025 2031 by Carol Jimenez RN  Trust Relationship/Rapport:   care explained   choices provided     Problem: Skin Injury Risk Increased  Goal: Skin Health and Integrity  Outcome: Progressing  Intervention: Optimize Skin Protection  Recent Flowsheet Documentation  Taken 7/27/2025 2031 by Carol Jimenez RN  Pressure Reduction Techniques: frequent weight shift encouraged  Pressure Reduction Devices: pressure-redistributing mattress utilized     Problem: Comorbidity Management  Goal: Blood Pressure in Desired Range  Outcome: Progressing  Intervention: Maintain Blood Pressure Management  Recent Flowsheet Documentation  Taken 7/27/2025 2031 by Carol Jimenez RN  Medication Review/Management: medications reviewed

## 2025-07-28 NOTE — THERAPY TREATMENT NOTE
Patient Name: Devin Scales  : 1940    MRN: [id number]                              Today's Date: 2025       Admit Date: 2025    Visit Dx:     ICD-10-CM ICD-9-CM   1. SAH (subarachnoid hemorrhage)  I60.9 430   2. STEVENSON (acute kidney injury)  N17.9 584.9   3. Laceration of forehead, initial encounter  S01.81XA 873.42     Patient Active Problem List   Diagnosis    Subarachnoid hemorrhage following injury, no loss of consciousness, initial encounter    COPD (chronic obstructive pulmonary disease)    Cigarette smoker    Alcohol dependence    Peripheral vascular disease    CSF leak    Acute kidney injury    Subarachnoid hematoma    SAH (subarachnoid hemorrhage)    Cellulitis of left lower extremity     Past Medical History:   Diagnosis Date    Benign essential tremor     BPH (benign prostatic hyperplasia)     Chest pain     Dyspnea     Fatigue     Hyperlipidemia      Past Surgical History:   Procedure Laterality Date    CARDIAC CATHETERIZATION N/A 2018    Procedure: Left Heart Cath;  Surgeon: Emelia Arellano MD;  Location: [facility] CATH INVASIVE LOCATION;  Service:     CARDIAC CATHETERIZATION N/A 2018    Procedure: Coronary angiography;  Surgeon: Emelia Arellano MD;  Location: [facility] CATH INVASIVE LOCATION;  Service:     CARDIAC CATHETERIZATION N/A 2018    Procedure: Left ventriculography;  Surgeon: Emelia Arellano MD;  Location: [facility] CATH INVASIVE LOCATION;  Service:       General Information       Row Name 25 1048          Physical Therapy Time and Intention    Document Type therapy note (daily note)  -EJ     Mode of Treatment physical therapy  -EJ       Row Name 25 1048          General Information    Existing Precautions/Restrictions fall  -EJ     Barriers to Rehab cognitive status;previous functional deficit  -EJ               User Key  (r) = Recorded By, (t) = Taken By, (c) = Cosigned By      Initials Name Provider Type    EJ Le Schumacher, PT Physical Therapist                    Mobility       Row Name 07/28/25 1048          Bed Mobility    Supine-Sit Brantley (Bed Mobility) verbal cues;minimum assist (75% patient effort)  -EJ     Sit-Supine Br[place]ley (Bed Mobility) not tested  -EJ     Assistive Device (Bed Mobility) head of bed elevated;bed rails  -EJ     Comment, (Bed Mobility) cues for sequencing  -EJ       [facility] Name 07/28/25 1048          Sit-Stand Transfer    Sit-Stand Brantley (Transfers) verbal cues;minimum assist (75% patient effort)  -EJ     Comment, (Sit-Stand Transfer) HHA, performed x 4, mild unsteadiness  -EJ       [facility] Name 07/28/25 1048          Gait/Stairs (Locomotion)    B[place]ley Level (Gait) verbal cues;minimum assist (75% patient effort)  -EJ     Assistive Device (Gait) --  HHA  -EJ     Distance in Feet (Gait) 2  -EJ     Deviations/Abnormal Patterns (Gait) alirio decreased;stride length decreased;weight shifting decreased;gait speed decreased;festinating/shuffling  -EJ     Bilateral Gait Deviations forward flexed posture;heel strike decreased  -EJ     Comment, (Gait/Stairs) unsteady, but able to take several small steps from bed to chair  -EJ               User Key  (r) = Recorded By, (t) = Taken By, (c) = Cosigned By      Initials Name Provider Type    Le Guillen, PT Physical Therapist                   Obj/Interventions       Row Name 07/28/25 1049          Balance    Static Sitting Balance standby assist;contact guard  -EJ     Position, Sitting Balance sitting edge of bed  -EJ     Static Standing Balance minimal assist  -EJ     Dynamic Standing Balance minimal assist;moderate assist  -EJ               User Key  (r) = Recorded By, (t) = Taken By, (c) = Cosigned By      Initials Name Provider Type    Le Guillen, PT Physical Therapist                   Goals/Plan    No documentation.                  Clinical Impression       [facility] Name 07/28/25 1049          Pain    Pretreatment Pain Rating 0/10 - no pain  -EJ      Posttreatment Pain Rating 0/10 - no pain  -EJ       Row Name 07/28/25 1049          Plan of Care Review    Plan of Care Reviewed With patient  -EJ     Outcome Evaluation Pt seen for PT this AM. He is pleasant and cooperative, but confused. He is able to transition to EOB w min A. He performed 4 standing trials w HHA/min A. Unsteadiness noted in standing and pt had to sit back down. On last stand, pt was able to take several small steps from bed to chair w HHA/min A. Pt left in recliner w all needs in reach. Anticipate SNF upon DC. Will continue to follow and progress as tolerated.  -EJ       Row Name 07/28/25 1049          Positioning and Restraints    Pre-Treatment Position in bed  -EJ     Post Treatment Position chair  -EJ     In Chair notified nsg;reclined;call light within reach;encouraged to call for assist;exit alarm on  -EJ               User Key  (r) = Recorded By, (t) = Taken By, (c) = Cosigned By      Initials Name Provider Type    Le Guillen, PT Physical Therapist                   Outcome Measures       Row Name 07/28/25 1051 07/28/25 0827       How much help from another person do you currently need...    Turning from your back to your side while in flat bed without using bedrails? 3  -EJ 3  -SR    Moving from lying on back to sitting on the side of a flat bed without bedrails? 3  -EJ 3  -SR    Moving to and from a bed to a chair (including a wheelchair)? 3  -EJ 3  -SR    Standing up from a chair using your arms (e.g., wheelchair, bedside chair)? 3  -EJ 3  -SR    Climbing 3-5 steps with a railing? 2  -EJ 2  -SR    To walk in hospital room? 2  -EJ 2  -SR    AM-PAC 6 Clicks Score (PT) 16  -EJ 16  -SR    Highest Level of Mobility Goal Stand (1 or More Minutes)-5  -EJ Stand (1 or More Minutes)-5  -SR              User Key  (r) = Recorded By, (t) = Taken By, (c) = Cosigned By      Initials Name Provider Type    Le Guillen, PT Physical Therapist    Elsi Soriano RN Registered  Nurse                                 Physical Therapy Education       Title: PT OT SLP Therapies (In Progress)       Topic: Physical Therapy (In Progress)       Point: Mobility training (In Progress)       Learning Progress Summary            Patient Acceptance, TB,E,D, NL,NR by  at 7/27/2025 1606                      Point: Home exercise program (In Progress)       Learning Progress Summary            Patient Acceptance, TB,E,D, NL,NR by  at 7/27/2025 1606                      Point: Body mechanics (In Progress)       Learning Progress Summary            Patient Acceptance, TB,E,D, NL,NR by  at 7/27/2025 1606                      Point: Precautions (In Progress)       Learning Progress Summary            Patient Acceptance, TB,E,D, NL,NR by  at 7/27/2025 1606                                      User Key       Initials Effective Dates Name Provider Type Discipline     04/08/22 -  Ailyn Masters, PT Physical Therapist PT                  PT Recommendation and Plan     Outcome Evaluation: Pt seen for PT this AM. He is pleasant and cooperative, but confused. He is able to transition to EOB w min A. He performed 4 standing trials w HHA/min A. Unsteadiness noted in standing and pt had to sit back down. On last stand, pt was able to take several small steps from bed to chair w HHA/min A. Pt left in recliner w all needs in reach. Anticipate SNF upon DC. Will continue to follow and progress as tolerated.     Time Calculation:         PT Charges       Row Name 07/28/25 1052             Time Calculation    Start Time 0927  -EJ      Stop Time 0950  -EJ      Time Calculation (min) 23 min  -EJ      PT Received On 07/28/25  -EJ      PT - Next Appointment 07/29/25  -EJ         Timed Charges    60693 - PT Therapeutic Activity Minutes 23  -EJ         Total Minutes    Timed Charges Total Minutes 23  -EJ       Total Minutes 23  -EJ                User Key  (r) = Recorded By, (t) = Taken By, (c) = Cosigned By       Initials Name Provider Type     Le Schumacher, PT Physical Therapist                  Therapy Charges for Today       Code Description Service Date Service Provider Modifiers Qty    74100280949 HC PT THERAPEUTIC ACT EA 15 MIN 7/28/2025 Le Schumacher, PT GP 2            PT G-Codes  Outcome Measure Options: AM-PAC 6 Clicks Basic Mobility (PT)  AM-PAC 6 Clicks Score (PT): 16       Le Schumacher, PT  7/28/2025

## 2025-07-28 NOTE — CASE MANAGEMENT/SOCIAL WORK
Discharge Planning Assessment  [facility]     Patient Name: Devin Scales  MRN: [id number]  Today's Date: 7/28/2025    Admit Date: 7/25/2025    Plan: Awaiting SNF choices.   Discharge Needs Assessment       Row Name 07/28/25 1751       Living Environment    People in Home alone    Current Living Arrangements home    Potentially Unsafe Housing Conditions none    In the past 12 months has the electric, gas, oil, or water company threatened to shut off services in your home? No    Primary Care Provided by self    Provides Primary Care For no one    Family Caregiver if Needed child(pineda), adult    Family Caregiver Names Devin Scales [phone]    Quality of Family Relationships involved;supportive       Resource/Environmental Concerns    Resource/Environmental Concerns none    Transportation Concerns other (see comments)  Patient has recently gotten lost while driving       Transportation Needs    In the past 12 months, has lack of transportation kept you from medical appointments or from getting medications? no    In the past 12 months, has lack of transportation kept you from meetings, work, or from getting things needed for daily living? No       Transition Planning    Patient/Family Anticipates Transition to inpatient rehabilitation facility    Patient/Family Anticipated Services at Transition ;skilled nursing    Transportation Anticipated health plan transportation;family or friend will provide       Discharge Needs Assessment    Readmission Within the Last 30 Days no previous admission in last 30 days    Equipment Currently Used at Home none    Concerns to be Addressed discharge planning    Anticipated Changes Related to Illness none    Discharge Facility/Level of Care Needs nursing facility, skilled    Provided Post Acute Provider List? Yes    Post Acute Provider List Nursing Home    Provided Post Acute Provider Quality & Resource List? Yes    Post Acute Provider Quality and Resource List Nursing  Home    Delivered To Support Person    Support Person Devin Scales    Method of Delivery Other (comment)  Email- [email or web address]                   Discharge Plan       Row Name 07/28/25 8434       Plan    Plan Awaiting SNF choices.    Patient/Family in Agreement with Plan yes    Plan Comments Spoke with patient's son via outbound call. Introduced self and explained CCP role. Verified face sheet and local pharmacy is [facility], choice to enroll in [facility]. Son denies difficulty with medication cost/ management. Patient lives alone in s/ home with a basement with 0 EMMA. Son denies HH, SNF, and DME history. Son states that patient has become increasingly confused and has gotten lost while driving in the past few months. Explained to patient's son that therapy is recommending SNF at discharge. Son requested patient choice list be emailed to [email or web address].                  Continued Care and Services - Admitted Since 7/25/2025    No active coordination exists.       Expected Discharge Date and Time       Expected Discharge Date Expected Discharge Time    Jul 30, 2025            Demographic Summary       Row Name 07/28/25 1746       General Information    Admission Type inpatient    Arrived From emergency department;home    Required Notices Provided Important Message from Medicare    Referral Source admission list    Reason for Consult discharge planning    Preferred Language English                   Functional Status       Row Name 07/28/25 1747       Functional Status    Usual Activity Tolerance good    Current Activity Tolerance fair       Physical Activity    On average, how many days per week do you engage in moderate to strenuous exercise (like a brisk walk)? 5 days  Per son, patient is IADL and active.    On average, how many minutes do you engage in exercise at this level? 30 min    Number of minutes of exercise per week 150       Functional Status, IADL    Medications independent    Meal  Preparation independent    Housekeeping independent    Laundry independent    Shopping independent    If for any reason you need help with day-to-day activities such as bathing, preparing meals, shopping, managing finances, etc., do you get the help you need? I could use a little more help  PEr son       Employment/    Employment Status retired                   Psychosocial       Row Name 07/28/25 2495       Values/Beliefs    Spiritual, Cultural Beliefs, Religion Practices, Values that Affect Care no                   Abuse/Neglect    No documentation.                  Legal       Row Name 07/28/25 6516       Financial Resource Strain    How hard is it for you to pay for the very basics like food, housing, medical care, and heating? Not very  Per son       Financial/Legal    Financial/Environmental Concerns none       Legal    Criminal Activity/Legal Involvement none                   Substance Abuse    No documentation.                  Patient Forms    No documentation.                     Soledad Samson RN

## 2025-07-28 NOTE — PLAN OF CARE
Goal Outcome Evaluation:  Plan of Care Reviewed With: patient           Outcome Evaluation: Pt seen for PT this AM. He is pleasant and cooperative, but confused. He is able to transition to EOB w min A. He performed 4 standing trials w HHA/min A. Unsteadiness noted in standing and pt had to sit back down. On last stand, pt was able to take several small steps from bed to chair w HHA/min A. Pt left in recliner w all needs in reach. Anticipate SNF upon DC. Will continue to follow and progress as tolerated.

## 2025-07-28 NOTE — PROGRESS NOTES
[facility] Progress Note      Patient Name: Devin Scales  : 1940  MRN: [id number]  Primary Care Physician:  Meron Starks DO  Date of admission: 2025    Subjective     Interval History:   Mediocre appetite, but no N/V  No shortness of breath on room air  UOP yesterday 1.8 L    Review of Systems:   As noted above    Objective     Vitals:   Temp:  [97.7 °F (36.5 °C)-98.7 °F (37.1 °C)] 97.7 °F (36.5 °C)  Heart Rate:  [] 94  Resp:  [16-20] 18  BP: (129-184)/(73-84) 129/73    Intake/Output Summary (Last 24 hours) at 2025 2337  Last data filed at 2025 1800  Gross per 24 hour   Intake 342 ml   Output 1775 ml   Net -1433 ml       Physical Exam:    Constitutional: Awake, NAD, chr ill, frail, gaunt, not oriented  HEENT: Sclera anicteric, no conjunctival injection, sutured laceration above right brow  Neck: Supple, no carotid bruit, trachea at midline, no JVD  Respiratory: Coarse bilaterally, nonlabored on RA  Cardiovascular: RR, tachycardic, no rub  Gastrointestinal: BS +, distended, soft, nontender   : ?palpable bladder  Musculoskeletal: Trace edema, no clubbing or cyanosis  Psychiatric: Blunted sensorium, cooperative, limited recall of recent events  Neurologic: No asterixis, moving all extremities, disorganized speech.    Skin: Warm and dry; erythema and blister left lower leg/foot       Scheduled Meds:     arformoterol, 15 mcg, Nebulization, BID - RT  aspirin, 81 mg, Oral, Daily  ceFAZolin, 2,000 mg, Intravenous, Q12H  mupirocin, 1 Application, Each Nare, BID  senna-docusate sodium, 2 tablet, Oral, BID      IV Meds:   sodium chloride, 100 mL/hr, Last Rate: 100 mL/hr (25 1051)        Results Reviewed:   I have personally reviewed the results from the time of this admission to 2025 23:37 EDT     Results from last 7 days   Lab Units 25  0611 25  0627 25  1435   SODIUM mmol/L 142 144 141   POTASSIUM mmol/L 4.2 4.7 5.0   CHLORIDE  mmol/L 106 108* 104   CO2 mmol/L 20.0* 24.0 26.0   BUN mg/dL 33.0* 34.0* 34.0*   CREATININE mg/dL 3.51* 2.93* 3.05*   CALCIUM mg/dL 8.9 8.9 9.2   BILIRUBIN mg/dL  --   --  0.5   ALK PHOS U/L  --   --  64   ALT (SGPT) U/L  --   --  19   AST (SGOT) U/L  --   --  23   GLUCOSE mg/dL 130* 100* 97       Estimated Creatinine Clearance: 13.3 mL/min (A) (by C-G formula based on SCr of 3.51 mg/dL (H)).    Results from last 7 days   Lab Units 07/27/25  0611 07/26/25  0627   MAGNESIUM mg/dL  --  2.1   PHOSPHORUS mg/dL 3.7 4.8*       Results from last 7 days   Lab Units 07/27/25  0611 07/26/25  2111   URIC ACID mg/dL 6.0 6.3       Results from last 7 days   Lab Units 07/26/25  0627 07/25/25  1435   WBC 10*3/mm3 9.19 10.10   HEMOGLOBIN g/dL 10.8* 10.5*   PLATELETS 10*3/mm3 266 286       Results from last 7 days   Lab Units 07/26/25  0627   INR  1.21*       Assessment / Plan     ASSESSMENT:  1.  STEVENSON versus STEVENSON/CKD, nonoliguric, worse.  Likely prerenal from hypovolemia and hypotension, and postrenal from urinary retention.  Mild hypernatremia suggesting water deficit; normal potassium; following bicarbonate likely due to IVF.  UA without blood or protein  2.  Recent fall with subarachnoid hemorrhage; MARTHA recommends conservative treatment for now  3.  Possible cellulitis left lower leg  4.  COPD  5.  Malnutrition  6.  Anemia with low iron saturation    PLAN:  1.  Continue bladder scan each shift  2.  Await urine electrolytes  3.  Continue IVF, though lower rate to 75 mL/h    Thank you for involving us in the care of Devin Scales.  Please feel free to call with any questions.    Merritt Sylvester MD  07/27/25  23:37 EDT    [facility]  [phone]    Please note that portions of this note were completed with a voice recognition program.

## 2025-07-28 NOTE — THERAPY EVALUATION
Patient Name: Devin Scales  : 1940    MRN: [id number]                              Today's Date: 2025       Admit Date: 2025    Visit Dx:     ICD-10-CM ICD-9-CM   1. SAH (subarachnoid hemorrhage)  I60.9 430   2. STEVENSON (acute kidney injury)  N17.9 584.9   3. Laceration of forehead, initial encounter  S01.81XA 873.42     Patient Active Problem List   Diagnosis    Subarachnoid hemorrhage following injury, no loss of consciousness, initial encounter    COPD (chronic obstructive pulmonary disease)    Cigarette smoker    Alcohol dependence    Peripheral vascular disease    CSF leak    Acute kidney injury    Subarachnoid hematoma    SAH (subarachnoid hemorrhage)    Cellulitis of left lower extremity     Past Medical History:   Diagnosis Date    Benign essential tremor     BPH (benign prostatic hyperplasia)     Chest pain     Dyspnea     Fatigue     Hyperlipidemia      Past Surgical History:   Procedure Laterality Date    CARDIAC CATHETERIZATION N/A 2018    Procedure: Left Heart Cath;  Surgeon: Emelia Arellano MD;  Location: [facility] CATH INVASIVE LOCATION;  Service:     CARDIAC CATHETERIZATION N/A 2018    Procedure: Coronary angiography;  Surgeon: Emelia Arellano MD;  Location: [facility] CATH INVASIVE LOCATION;  Service:     CARDIAC CATHETERIZATION N/A 2018    Procedure: Left ventriculography;  Surgeon: Emelia Arellano MD;  Location: [facility] INVASIVE LOCATION;  Service:       General Information       Row Name 25 1228          OT Time and Intention    Subjective Information no complaints  -LE     Document Type evaluation;therapy note (daily note)  -LE     Mode of Treatment individual therapy;occupational therapy  -LE     Patient Effort adequate  -LE     Symptoms Noted During/After Treatment none  -LE       Row Name 25 1228          General Information    Patient Profile Reviewed yes  -LE     Prior Level of Function transfer;ADL's  pt lives alone.  unclear if pt was using a walker  at baseline.  -LE     Existing Precautions/Restrictions fall  -LE     Barriers to Rehab cognitive status  -LE       Row Name 07/28/25 1228          Living Environment    Current Living Arrangements home  -LE     People in Home alone  -LE       Row Name 07/28/25 1228          Cognition    Orientation Status (Cognition) oriented to;person  unaware in hospital, thinks season is winter, does not recall why in hospital.  -LE       Row Name 07/28/25 1228          Safety Issues/Impairments Affecting Functional Mobility    Safety Issues Affecting Function (Mobility) ability to follow commands;awareness of need for assistance;insight into deficits/self-awareness;judgment;problem-solving;positioning of assistive device;sequencing abilities  -LE     Impairments Affecting Function (Mobility) balance;cognition;endurance/activity tolerance;strength  -LE     Comment, Safety Issues/Impairments (Mobility) non skid socks and gait belt worn.  -LE               User Key  (r) = Recorded By, (t) = Taken By, (c) = Cosigned By      Initials Name Provider Type    Opal Stiles, OTR/L, CSRS Occupational Therapist                     Mobility/ADL's       Row Name 07/28/25 1232          Bed Mobility    Bed Mobility supine-sit;sit-supine;scooting/bridging  -LE     Comment, (Bed Mobility) UIC  -       Row Name 07/28/25 1232          Transfers    Transfers sit-stand transfer;stand-sit transfer;bed-chair transfer;toilet transfer  -       Row Name 07/28/25 1232          Bed-Chair Transfer    Bed-Chair [place] (Transfers) set up;verbal cues;minimum assist (75% patient effort);contact guard;nonverbal cues (demo/gesture)  -LE     Assistive Device (Bed-Chair Transfers) walker, front-wheeled  -LE       Row Name 07/28/25 1232          Sit-Stand Transfer    Sit-Stand [place] (Transfers) set up;verbal cues;contact guard;minimum assist (75% patient effort);nonverbal cues (demo/gesture)  -LE     Assistive Device (Sit-Stand Transfers) walker,  front-wheeled  -LIOR       Row Name 07/28/25 1232          Stand-Sit Transfer    Stand-Sit [place] (Transfers) set up;verbal cues;contact guard;minimum assist (75% patient effort);nonverbal cues (demo/gesture)  -     Assistive Device (Stand-Sit Transfers) walker, front-wheeled  -LIOR       Row Name 07/28/25 1232          Toilet Transfer    Comment, (Toilet Transfer) sat on BSC (did not void) from walker with guide assist to turn with walker.  -       Row Name 07/28/25 1232          Functional Mobility    Functional Mobility- Ind. Level set up required;contact guard assist;minimum assist (75% patient effort);verbal cues required;nonverbal cues required (demo/gesture)  -LE     Functional Mobility- Device walker, front-wheeled  -LIOR     Functional Mobility- Comment pt stands from chair, then walks short distance to BSC.  Then walked to sink but had to stop and sit in chair so OT could clear path to sink. guide assist with walker.  -[facility] Name 07/28/25 1232          Activities of Daily Living    BADL Assessment/Intervention toileting;feeding;grooming;lower body dressing;upper body dressing;bathing  -[facility] Name 07/28/25 1232          Lower Body Dressing Assessment/Training    [place] Level (Lower Body Dressing) don;doff;socks;moderate assist (50% patient effort)  -LIOR     Comment, (Lower Body Dressing) doff socks but assist to ct socks.  -[facility] Name 07/28/25 1232          Grooming Assessment/Training    [place] Level (Grooming) oral care regimen;wash face, hands;set up;standby assist;contact guard assist  -LIOR     Position (Grooming) supported standing  -LE     Comment, (Grooming) OT sets up and guide through task due to impaired sequencing.  -LIOR               User Key  (r) = Recorded By, (t) = Taken By, (c) = Cosigned By      Initials Name Provider Type    Opal Stiles OTR/L, CSRS Occupational Therapist                   Obj/Interventions       Row Name 07/28/25 1235           Sensory Assessment (Somatosensory)    Sensory Assessment (Somatosensory) unable/difficult to assess  -[facility] Name 07/28/25 1235          Range of Motion Comprehensive    Comment, General Range of Motion B UE 2/3 at shld and 7/8  elbows and hands for AROM  -[facility] Name 07/28/25 1235          Strength Comprehensive (MMT)    Comment, General Manual Muscle Testing (MMT) Assessment B UE 4-/5.  -[facility] Name 07/28/25 1235          Balance    Balance Assessment sitting static balance;standing static balance;standing dynamic balance  -LE     Static Sitting Balance standby assist  -LE     Dynamic Sitting Balance standby assist  -LE     Static Standing Balance contact guard  -LE     Dynamic Standing Balance minimal assist  -LE     Position/Device Used, Standing Balance walker, front-wheeled  -LE     Comment, Balance concern for unsteadiness especially when pt distracted or taking hands off walker to do a functional task like wash hands.  -LE               User Key  (r) = Recorded By, (t) = Taken By, (c) = Cosigned By      Initials Name Provider Type    Opal Stiles, OTR/L, CSRS Occupational Therapist                   Goals/Plan       Row Name 07/28/25 1239          Transfer Goal 1 (OT)    Activity/Assistive Device (Transfer Goal 1, OT) sit-to-stand/stand-to-sit;bed-to-chair/chair-to-bed;toilet;commode, 3-in-1;walker, rolling  -LE     [place] Level/Cues Needed (Transfer Goal 1, OT) set-up required;standby assist;verbal cues required  -LE     Time Frame (Transfer Goal 1, OT) 2 weeks  -LE     Progress/Outcome (Transfer Goal 1, OT) goal ongoing  -LE       Row Name 07/28/25 1239          Dressing Goal 1 (OT)    Activity/Device (Dressing Goal 1, OT) upper body dressing;lower body dressing  -LE     [place]/Cues Needed (Dressing Goal 1, OT) standby assist;set-up required;verbal cues required  -LE     Time Frame (Dressing Goal 1, OT) 2 weeks  -LE     Progress/Outcome (Dressing Goal 1, OT) goal ongoing   -LE       Row Name 07/28/25 1239          Toileting Goal 1 (OT)    Activity/Device (Toileting Goal 1, OT) toileting skills, all;commode, 3-in-1;grab bar/safety frame  -LE     [place] Level/Cues Needed (Toileting Goal 1, OT) set-up required;standby assist;verbal cues required  -LE     Time Frame (Toileting Goal 1, OT) 2 weeks  -LE     Progress/Outcome (Toileting Goal 1, OT) goal ongoing  -LE       Row Name 07/28/25 1239          Problem Specific Goal 1 (OT)    Problem Specific Goal 1 (OT) SBA ambulation to/from bathroom with walker.  -LE     Time Frame (Problem Specific Goal 1, OT) 2 weeks  -LE     Progress/Outcome (Problem Specific Goal 1, OT) goal ongoing  -LE       Row Name 07/28/25 1239          Therapy Assessment/Plan (OT)    Planned Therapy Interventions (OT) activity tolerance training;adaptive equipment training;BADL retraining;functional balance retraining;patient/caregiver education/training;strengthening exercise;transfer/mobility retraining  -LE               User Key  (r) = Recorded By, (t) = Taken By, (c) = Cosigned By      Initials Name Provider Type    Opal Stiles, OTR/L, CSRS Occupational Therapist                   Clinical Impression       Row Name 07/28/25 1237          Pain Assessment    Pretreatment Pain Rating 0/10 - no pain  -LE       Row Name 07/28/25 1237          Plan of Care Review    Plan of Care Reviewed With patient  -LE     Outcome Evaluation Pt admit from home and found down.  Work up for SAH.  Pt lives alone and history includes dementia. Pt presents up in chair, is alert and oriented only to self.   Pt confusion limits cueing and following commands while OT assist pt in walking in room, to sink to groom and return to recliner.   Pt able to doff socks but becomes fixated on monitor wires and unable to ct socks.   Pt demo unsteadiness at walker.  Will cont to follow for skilled OT while in acute care and recommend rehab at d/c as pt unsafe to return home alone.  -LE        Row Name 07/28/25 1237          Therapy Assessment/Plan (OT)    Patient/Family Therapy Goal Statement (OT) Return to prior level of function.  -LE     Rehab Potential (OT) fair  -LE     Criteria for Skilled Therapeutic Interventions Met (OT) meets criteria;yes  -LE     Therapy Frequency (OT) 3 times/wk  -LE       Row Name 07/28/25 1237          Therapy Plan Review/Discharge Plan (OT)    Equipment Needs Upon Discharge (OT) --  unknown what owned.  may benefit from walker for balance but unsure of cognition will allow for new learning of an AD  -LE     Anticipated Discharge Disposition (OT) skilled nursing facility  -LE       Row Name 07/28/25 1237          Vital Signs    O2 Delivery Pre Treatment room air  -LE     O2 Delivery Intra Treatment room air  -LE     O2 Delivery Post Treatment room air  -LE     Pre Patient Position Sitting  -LE     Intra Patient Position Standing  -LE     Post Patient Position Sitting  -LE       Row Name 07/28/25 1237          Positioning and Restraints    Pre-Treatment Position sitting in chair/recliner  -LE     Post Treatment Position chair  -LE     In Chair notified nsg;reclined;call light within reach;encouraged to call for assist;exit alarm on  ed on how to use call light  -LE               User Key  (r) = Recorded By, (t) = Taken By, (c) = Cosigned By      Initials Name Provider Type    Opal Stiles, OTR/L, CSRS Occupational Therapist                   Outcome Measures       Row Name 07/28/25 1241          How much help from another is currently needed...    Putting on and taking off regular lower body clothing? 2  -LE     Bathing (including washing, rinsing, and drying) 2  -LE     Toileting (which includes using toilet bed pan or urinal) 2  -LE     Putting on and taking off regular upper body clothing 3  -LE     Taking care of personal grooming (such as brushing teeth) 3  -LE     Eating meals 3  -LE     AM-PAC 6 Clicks Score (OT) 15  -LE       Row Name 07/28/25 1051 07/28/25 3911        How much help from another person do you currently need...    Turning from your back to your side while in flat bed without using bedrails? 3  -EJ 3  -SR    Moving from lying on back to sitting on the side of a flat bed without bedrails? 3  -EJ 3  -SR    Moving to and from a bed to a chair (including a wheelchair)? 3  -EJ 3  -SR    Standing up from a chair using your arms (e.g., wheelchair, bedside chair)? 3  -EJ 3  -SR    Climbing 3-5 steps with a railing? 2  -EJ 2  -SR    To walk in hospital room? 2  -EJ 2  -SR    AM-PAC 6 Clicks Score (PT) 16  -EJ 16  -SR    Highest Level of Mobility Goal Stand (1 or More Minutes)-5  -EJ Stand (1 or More Minutes)-5  -SR      Row Name 07/28/25 1241          Modified Kevin Scale    Modified [place]owell Scale 4 - Moderately severe disability.  Unable to walk without assistance, and unable to attend to own bodily needs without assistance.  -LE       Row Name 07/28/25 1241          Functional Assessment    Outcome Measure Options AM-PAC 6 Clicks Daily Activity (OT);Modified Kevin  -LE               User Key  (r) = Recorded By, (t) = Taken By, (c) = Cosigned By      Initials Name Provider Type    Opal Stiles, ALTAGRACIAR/L, CSRS Occupational Therapist    [place], PT Physical Therapist    SR Elsi Bell, RN Registered Nurse                    Occupational Therapy Education       Title: PT OT SLP Therapies (In Progress)       Topic: Occupational Therapy (In Progress)       Point: ADL training (Done)       Learning Progress Summary            Patient Acceptance, E, Bed IU,NR by LIOR at 7/28/2025 1241    Comment: role of OT, plan of care, fall risk. cognition limits retention                      Point: Precautions (Done)       Learning Progress Summary            Patient Acceptance, E, Bed IU,NR by LIOR at 7/28/2025 1241    Comment: role of OT, plan of care, fall risk. cognition limits retention                      Point: Body mechanics (Done)       Learning Progress  Summary            Patient Acceptance, E, Bed IU,NR by LIOR at 7/28/2025 1241    Comment: role of OT, plan of care, fall risk. cognition limits retention                                      User Key       Initials Effective Dates Name Provider Type Discipline     04/25/25 -  Opal Self, OTR/L, CSRS Occupational Therapist OT                  OT Recommendation and Plan  Planned Therapy Interventions (OT): activity tolerance training, adaptive equipment training, BADL retraining, functional balance retraining, patient/caregiver education/training, strengthening exercise, transfer/mobility retraining  Therapy Frequency (OT): 3 times/wk  Plan of Care Review  Plan of Care Reviewed With: patient  Outcome Evaluation: Pt admit from home and found down.  Work up for SAH.  Pt lives alone and history includes dementia. Pt presents up in chair, is alert and oriented only to self.   Pt confusion limits cueing and following commands while OT assist pt in walking in room, to sink to groom and return to recliner.   Pt able to doff socks but becomes fixated on monitor wires and unable to ct socks.   Pt demo unsteadiness at walker.  Will cont to follow for skilled OT while in acute care and recommend rehab at d/c as pt unsafe to return home alone.     Time Calculation:   Evaluation Complexity (OT)  Review Occupational Profile/Medical/Therapy History Complexity: expanded/moderate complexity  Assessment, Occupational Performance/Identification of Deficit Complexity: 3-5 performance deficits  Clinical Decision Making Complexity (OT): detailed assessment/moderate complexity  Overall Complexity of Evaluation (OT): moderate complexity     Time Calculation- OT       Row Name 07/28/25 1242             Time Calculation- OT    OT Start Time 1132  -LE      OT Stop Time 1156  -LE      OT Time Calculation (min) 24 min  -LE      Total Timed Code Minutes- OT 10 minute(s)  -LE      OT Received On 07/28/25  -LE      OT - Next Appointment 07/30/25   -LE      OT Goal Re-Cert Due Date 08/11/25  -LE         Timed Charges    10996 - OT Self Care/Mgmt Minutes 10  -LE         Total Minutes    Timed Charges Total Minutes 10  -LE       Total Minutes 10  -LE                User Key  (r) = Recorded By, (t) = Taken By, (c) = Cosigned By      Initials Name Provider Type    Opal Stiles, OTR/L, CSRS Occupational Therapist                  Therapy Charges for Today       Code Description Service Date Service Provider Modifiers Qty    71733290878 HC OT SELF CARE/MGMT/TRAIN EA 15 MIN 7/28/2025 Opal Self, OTR/L, CSRS GO 1    47576783919 HC OT EVAL MOD COMPLEXITY 3 7/28/2025 Opal Self, OTR/L, CSRS GO 1                 Opal Elder, OTR/L, CSRS  7/28/2025

## 2025-07-28 NOTE — PLAN OF CARE
Goal Outcome Evaluation:  Plan of Care Reviewed With: patient           Outcome Evaluation: Pt admit from home and found down.  Work up for SAH.  Pt lives alone and history includes dementia. Pt presents up in chair, is alert and oriented only to self.   Pt confusion limits cueing and following commands while OT assist pt in walking in room, to sink to groom and return to recliner.   Pt able to doff socks but becomes fixated on monitor wires and unable to ct socks.   Pt demo unsteadiness at walker.  Will cont to follow for skilled OT while in acute care and recommend rehab at d/c as pt unsafe to return home alone.    Anticipated Discharge Disposition (OT): skilled nursing facility

## 2025-07-28 NOTE — PROGRESS NOTES
Name: Devin Scales ADMIT: 2025   : 1940  PCP: Meron Starks DO    MRN: [id number] LOS: 3 days   AGE/SEX: 84 y.o. male  ROOM: [facility]   Subjective   Chief Complaint   Patient presents with    Fall     Head lac, L leg swelling and redness   Multiple falls      On abx  Had urinary retention today  Previous I/O cath  Moctezuma placed now    ROS  No f/c  No n/v  No cp/palp  No soa/cough    Objective   Vital Signs  Temp:  [97.3 °F (36.3 °C)-98.2 °F (36.8 °C)] 97.3 °F (36.3 °C)  Heart Rate:  [72-94] 81  Resp:  [18-20] 20  BP: (120-134)/(63-74) 121/63  SpO2:  [94 %-99 %] 99 %  on   ;   Device (Oxygen Therapy): room air  Body mass index is 19.57 kg/m².    Physical Exam  HENT:      Head: Normocephalic and atraumatic.   Eyes:      General: No scleral icterus.  Cardiovascular:      Rate and Rhythm: Normal rate and regular rhythm.      Heart sounds: Normal heart sounds.   Pulmonary:      Effort: Pulmonary effort is normal. No respiratory distress.      Breath sounds: Normal breath sounds.   Abdominal:      General: There is no distension.      Palpations: Abdomen is soft.      Tenderness: There is no abdominal tenderness.   Musculoskeletal:      Cervical back: Neck supple.   Neurological:      Mental Status: He is alert.   Psychiatric:         Behavior: Behavior normal.     Elderly, some poor memory, chronically ill  Cellulitis appears improved    Results Review:       I reviewed the patient's new clinical results.  Results from last 7 days   Lab Units 25  0627 25  1435   WBC 10*3/mm3 9.19 10.10   HEMOGLOBIN g/dL 10.8* 10.5*   PLATELETS 10*3/mm3 266 286     Results from last 7 days   Lab Units 25  0835 25  0611 25  0627 25  1435   SODIUM mmol/L 145 142 144 141   POTASSIUM mmol/L 4.1 4.2 4.7 5.0   CHLORIDE mmol/L 109* 106 108* 104   CO2 mmol/L 23.2 20.0* 24.0 26.0   BUN mg/dL 29.0* 33.0* 34.0* 34.0*   CREATININE mg/dL 2.86* 3.51* 2.93* 3.05*   GLUCOSE mg/dL 112* 130* 100* 97  "  Estimated Creatinine Clearance: 16.3 mL/min (A) (by C-G formula based on SCr of 2.86 mg/dL (H)).  Results from last 7 days   Lab Units 07/28/25  0835 07/27/25  0611 07/25/25  1435   ALBUMIN g/dL 2.8* 3.5 3.4*   BILIRUBIN mg/dL  --   --  0.5   ALK PHOS U/L  --   --  64   AST (SGOT) U/L  --   --  23   ALT (SGPT) U/L  --   --  19     Results from last 7 days   Lab Units 07/28/25  0835 07/27/25  0611 07/26/25  0627 07/25/25  1435   CALCIUM mg/dL 8.4* 8.9 8.9 9.2   ALBUMIN g/dL 2.8* 3.5  --  3.4*   MAGNESIUM mg/dL  --   --  2.1  --    PHOSPHORUS mg/dL 4.1 3.7 4.8*  --          Coag   Results from last 7 days   Lab Units 07/26/25  0627   INR  1.21*   APTT seconds 27.5     HbA1C No results found for: \"HGBA1C\"  Infection     Radiology(recent) No radiology results for the last day  No results found for: \"TROPONINT\", \"TROPONINI\", \"BNP\"  No components found for: \"TSH;2\"    arformoterol, 15 mcg, Nebulization, BID - RT  aspirin, 81 mg, Oral, Daily  ceFAZolin, 2,000 mg, Intravenous, Q12H  mupirocin, 1 Application, Each Nare, BID  senna-docusate sodium, 2 tablet, Oral, BID  tamsulosin, 0.4 mg, Oral, Daily      sodium chloride, 75 mL/hr, Last Rate: 75 mL/hr (07/28/25 1601)    Diet: Regular/House; Fluid Consistency: Thin (IDDSI 0)      Assessment & Plan      Active Hospital Problems    Diagnosis  POA    **Subarachnoid hematoma [S06.6XAA]  Yes    Cellulitis of left lower extremity [L03.116]  Unknown    SAH (subarachnoid hemorrhage) [I60.9]  Yes    Cigarette smoker [F17.210]  Yes    COPD (chronic obstructive pulmonary disease) [J44.9]  Yes    Peripheral vascular disease [I73.9]  Yes      Resolved Hospital Problems   No resolved problems to display.     85 yo who presented with a fall and SAH. Seen by MARTHA who recommended conservative management    STEVENSON- nephrology following. On IVFs. Moctezuma placed for urinary retention  On cefazolin for cellulitis which appears improving  On bronchodilators for COPD without exacerbation  Therapy to " work on getting stronger      ANA RN      Ga Whitmore MD  [facility]  07/28/25  16:13 EDT

## 2025-07-29 LAB
ALBUMIN SERPL-MCNC: 2.2 G/DL (ref 3.5–5.2)
ANION GAP SERPL CALCULATED.3IONS-SCNC: 9.8 MMOL/L (ref 5–15)
BASOPHILS # BLD AUTO: 0.03 10*3/MM3 (ref 0–0.2)
BASOPHILS NFR BLD AUTO: 0.4 % (ref 0–1.5)
BUN SERPL-MCNC: 24 MG/DL (ref 8–23)
BUN/CREAT SERPL: 10 (ref 7–25)
CALCIUM SPEC-SCNC: 7.9 MG/DL (ref 8.6–10.5)
CHLORIDE SERPL-SCNC: 109 MMOL/L (ref 98–107)
CO2 SERPL-SCNC: 21.2 MMOL/L (ref 22–29)
CREAT SERPL-MCNC: 2.39 MG/DL (ref 0.76–1.27)
DEPRECATED RDW RBC AUTO: 42.9 FL (ref 37–54)
EGFRCR SERPLBLD CKD-EPI 2021: 26.4 ML/MIN/1.73
EOSINOPHIL # BLD AUTO: 0.16 10*3/MM3 (ref 0–0.4)
EOSINOPHIL NFR BLD AUTO: 2 % (ref 0.3–6.2)
ERYTHROCYTE [DISTWIDTH] IN BLOOD BY AUTOMATED COUNT: 12.1 % (ref 12.3–15.4)
GLUCOSE SERPL-MCNC: 85 MG/DL (ref 65–99)
HCT VFR BLD AUTO: 26.5 % (ref 37.5–51)
HGB BLD-MCNC: 8.9 G/DL (ref 13–17.7)
IMM GRANULOCYTES # BLD AUTO: 0.03 10*3/MM3 (ref 0–0.05)
IMM GRANULOCYTES NFR BLD AUTO: 0.4 % (ref 0–0.5)
LYMPHOCYTES # BLD AUTO: 1.4 10*3/MM3 (ref 0.7–3.1)
LYMPHOCYTES NFR BLD AUTO: 17.1 % (ref 19.6–45.3)
MAGNESIUM SERPL-MCNC: 1.8 MG/DL (ref 1.6–2.4)
MCH RBC QN AUTO: 33 PG (ref 26.6–33)
MCHC RBC AUTO-ENTMCNC: 33.6 G/DL (ref 31.5–35.7)
MCV RBC AUTO: 98.1 FL (ref 79–97)
MONOCYTES # BLD AUTO: 0.56 10*3/MM3 (ref 0.1–0.9)
MONOCYTES NFR BLD AUTO: 6.8 % (ref 5–12)
NEUTROPHILS NFR BLD AUTO: 6.02 10*3/MM3 (ref 1.7–7)
NEUTROPHILS NFR BLD AUTO: 73.3 % (ref 42.7–76)
NRBC BLD AUTO-RTO: 0 /100 WBC (ref 0–0.2)
PHOSPHATE SERPL-MCNC: 3.3 MG/DL (ref 2.5–4.5)
PLATELET # BLD AUTO: 183 10*3/MM3 (ref 140–450)
PMV BLD AUTO: 10.1 FL (ref 6–12)
POTASSIUM SERPL-SCNC: 3.9 MMOL/L (ref 3.5–5.2)
RBC # BLD AUTO: 2.7 10*6/MM3 (ref 4.14–5.8)
SODIUM SERPL-SCNC: 140 MMOL/L (ref 136–145)
WBC NRBC COR # BLD AUTO: 8.2 10*3/MM3 (ref 3.4–10.8)

## 2025-07-29 PROCEDURE — 85025 COMPLETE CBC W/AUTO DIFF WBC: CPT

## 2025-07-29 PROCEDURE — 94761 N-INVAS EAR/PLS OXIMETRY MLT: CPT

## 2025-07-29 PROCEDURE — 94799 UNLISTED PULMONARY SVC/PX: CPT

## 2025-07-29 PROCEDURE — 25010000002 THIAMINE HCL 200 MG/2ML SOLUTION: Performed by: INTERNAL MEDICINE

## 2025-07-29 PROCEDURE — 80069 RENAL FUNCTION PANEL: CPT

## 2025-07-29 PROCEDURE — 25010000002 CEFAZOLIN PER 500 MG: Performed by: HOSPITALIST

## 2025-07-29 PROCEDURE — 83735 ASSAY OF MAGNESIUM: CPT

## 2025-07-29 RX ORDER — THIAMINE HYDROCHLORIDE 100 MG/ML
100 INJECTION, SOLUTION INTRAMUSCULAR; INTRAVENOUS ONCE
Status: COMPLETED | OUTPATIENT
Start: 2025-07-29 | End: 2025-07-29

## 2025-07-29 RX ADMIN — SODIUM CHLORIDE 75 ML/HR: 450 INJECTION, SOLUTION INTRAVENOUS at 02:17

## 2025-07-29 RX ADMIN — CEFAZOLIN 2000 MG: 2 INJECTION, POWDER, FOR SOLUTION INTRAMUSCULAR; INTRAVENOUS at 16:57

## 2025-07-29 RX ADMIN — SENNOSIDES AND DOCUSATE SODIUM 2 TABLET: 50; 8.6 TABLET ORAL at 08:34

## 2025-07-29 RX ADMIN — SENNOSIDES AND DOCUSATE SODIUM 2 TABLET: 50; 8.6 TABLET ORAL at 21:02

## 2025-07-29 RX ADMIN — ARFORMOTEROL TARTRATE 15 MCG: 15 SOLUTION RESPIRATORY (INHALATION) at 19:50

## 2025-07-29 RX ADMIN — MUPIROCIN 1 APPLICATION: 20 OINTMENT TOPICAL at 08:34

## 2025-07-29 RX ADMIN — CEFAZOLIN 2000 MG: 2 INJECTION, POWDER, FOR SOLUTION INTRAMUSCULAR; INTRAVENOUS at 05:06

## 2025-07-29 RX ADMIN — ASPIRIN 81 MG: 81 TABLET, COATED ORAL at 08:34

## 2025-07-29 RX ADMIN — MUPIROCIN 1 APPLICATION: 20 OINTMENT TOPICAL at 21:02

## 2025-07-29 RX ADMIN — ARFORMOTEROL TARTRATE 15 MCG: 15 SOLUTION RESPIRATORY (INHALATION) at 06:37

## 2025-07-29 RX ADMIN — THIAMINE HYDROCHLORIDE 100 MG: 100 INJECTION, SOLUTION INTRAMUSCULAR; INTRAVENOUS at 14:31

## 2025-07-29 RX ADMIN — TAMSULOSIN HYDROCHLORIDE 0.4 MG: 0.4 CAPSULE ORAL at 08:33

## 2025-07-29 NOTE — CASE MANAGEMENT/SOCIAL WORK
Continued Stay Note  [facility]     Patient Name: Devin Scales  MRN: [id number]  Today's Date: 7/29/2025    Admit Date: 7/25/2025    Plan: Referrals pending for SNF.   Discharge Plan       Row Name 07/29/25 1708       Plan    Plan Referrals pending for SNF.    Patient/Family in Agreement with Plan yes    Plan Comments Incoming call received from patient's son with choices for SNF. Referrals placed.                   Discharge Codes    No documentation.                 Expected Discharge Date and Time       Expected Discharge Date Expected Discharge Time    Jul 30, 2025               Soledad Samson RN

## 2025-07-29 NOTE — PROGRESS NOTES
"Patient Name: Devin Scales  YOB: 1940  MRN: [id number]  Admission date: 7/25/2025  Reason for Encounter: Follow-up/Progress Note and [facility] Clinical Nutrition Assessment     Recommend:  Continue Boost Original BID (Provides 480 kcals, 20 g protein if consumed).  Encourage adequate po intake of all meals/supplements.     Based on ASPEN/AND Criteria, patient meets nutrition diagnosis of Moderate Malnutrition of Chronic Disease or Illness due to inadequate po intake and moderate-severe fat/muscle wasting noted on NFPE.     RD to follow per protocol.     Subjective    Subjective Information     7/27: Pt is an 84 y.o. male with a history of COPD, HTN, and GERD who presented after a fall. Found to have SAH. Pt reports he thinks his wt is stable. Will f/u for full nutrition interview and NFPE.    7/29: Pt eating lunch during visit today. Pt states he is unsure if he has lost any weight recently. Pt with severe fat/muscle wasting noted on NFPE. Pt states that his appetite has improved and now tolerating 56% average po intake x 6 meals of regular diet and drinking Boost supplements. Meds/labs reviewed, Pre-albumin 11.6. 1 BM today. Emphasized importance of adequate po intake of meals/supplements. Will continue Boost Original BID.      Objective   H&P and Current Problems          Current Problems   Admission Diagnosis:  SAH (subarachnoid hemorrhage) [I60.9]  STEVENSON (acute kidney injury) [N17.9]  Subarachnoid hematoma [S06.6XAA]  Laceration of forehead, initial encounter [S01.81XA]    Problem List:    Subarachnoid hematoma    COPD (chronic obstructive pulmonary disease)    Cigarette smoker    Peripheral vascular disease    Acute kidney injury    SAH (subarachnoid hemorrhage)    Cellulitis of left lower extremity         Anthropometrics       Height: 172 cm (67.72\")  Weight: 59 kg (130 lb 1.1 oz) (07/29/25 0600)  Weight Method: Bed scale  BMI (Calculated): 19.9       Trending Weight Changes " "07/27/25: Unknown/Unable to Determine       Weight History  Wt Readings from Last 20 Encounters:   07/29/25 0600 59 kg (130 lb 1.1 oz)   07/28/25 0500 57.9 kg (127 lb 10.3 oz)   07/26/25 0920 58 kg (127 lb 13.9 oz)   07/26/25 0001 58 kg (127 lb 13.9 oz)   07/25/25 1744 58 kg (127 lb 13.9 oz)   05/31/23 2010 61.5 kg (135 lb 9.6 oz)   05/31/23 1601 59.9 kg (132 lb)   01/16/23 1050 61.2 kg (135 lb)   12/26/22 0500 62.8 kg (138 lb 7.2 oz)   02/13/18 0658 67.1 kg (148 lb)        Labs      Comment: Reviewed, pre-albumin 11.6     Results from last 7 days   Lab Units 07/29/25  0535 07/28/25  0835 07/27/25  0611 07/26/25  0627 07/25/25  1435 07/25/25  1435   SODIUM mmol/L 140 145 142 144  --  141   POTASSIUM mmol/L 3.9 4.1 4.2 4.7  --  5.0   GLUCOSE mg/dL 85 112* 130* 100*  --  97   BUN mg/dL 24.0* 29.0* 33.0* 34.0*  --  34.0*   CREATININE mg/dL 2.39* 2.86* 3.51* 2.93*  --  3.05*   CALCIUM mg/dL 7.9* 8.4* 8.9 8.9  --  9.2   PHOSPHORUS mg/dL 3.3 4.1 3.7 4.8*   < >  --    MAGNESIUM mg/dL 1.8  --   --  2.1  --   --    ALBUMIN g/dL 2.2* 2.8* 3.5  --   --  3.4*   PREALBUMIN mg/dL  --   --  11.6*  --   --   --    BILIRUBIN mg/dL  --   --   --   --   --  0.5   ALK PHOS U/L  --   --   --   --   --  64   AST (SGOT) U/L  --   --   --   --   --  23   ALT (SGPT) U/L  --   --   --   --   --  19   PROBNP pg/mL  --   --   --   --   --  579.0    < > = values in this interval not displayed.     Results from last 7 days   Lab Units 07/29/25  0535 07/27/25  0611 07/26/25  0627 07/25/25  1435   PLATELETS 10*3/mm3 183  --  266 286   HEMOGLOBIN g/dL 8.9*  --  10.8* 10.5*   HEMATOCRIT % 26.5*  --  32.1* 31.5*   IRON mcg/dL  --  38*  --   --      No results found for: \"HGBA1C\"   Results from last 7 days   Lab Units 07/27/25  0611   URIC ACID mg/dL 6.0     Medications       Scheduled Medications arformoterol, 15 mcg, Nebulization, BID - RT  aspirin, 81 mg, Oral, Daily  ceFAZolin, 2,000 mg, Intravenous, Q12H  mupirocin, 1 Application, Each Nare, " BID  senna-docusate sodium, 2 tablet, Oral, BID  tamsulosin, 0.4 mg, Oral, Daily  [START ON 7/30/2025] thiamine, 100 mg, Oral, Daily        Infusions        PRN Medications   acetaminophen **OR** acetaminophen    aluminum-magnesium hydroxide-simethicone    senna-docusate sodium **AND** polyethylene glycol **AND** bisacodyl **AND** bisacodyl    fentaNYL citrate (PF)    hydrALAZINE    nitroglycerin    ondansetron ODT **OR** ondansetron     Physical Findings      Chewing/Swallowing  Teeth Status: Mouth/Teeth WDL: .WDL except, teeth Teeth Symptoms: tooth/teeth missing  Chewing/Swallowing Issues: No issues identified at this time   Edema            Leg, Left Edema: 2+ (Mild) Leg, Right Edema: 1+ (Trace)  Ankle, Left Edema: 2+ (Mild) Ankle, Right Edema: 1+ (Trace)  Foot, Left Edema: 2+ (Mild) Foot, Right Edema: 1+ (Trace)   Bowel Function  Stool Output  Stool Unmeasured Occurrence: 1 (07/29/25 1520)  Bowel Incontinence: Yes (07/29/25 1520)  Stool Amount: Other (Comment) (xxl) (07/29/25 1520)  Stool Consistency: loose (07/29/25 1520)  Perineal Care: absorbent brief/pad changed, catheter care provided, diaper changed, perineal hygiene encouraged, perineum cleansed (07/29/25 1520)  Last Bowel Movement: 07/25/25   I/Os  Intake & Output (last 3 days)         07/26 0701 07/27 0700 07/27 0701 07/28 0700 07/28 0701  07/29 0700 07/29 0701 07/30 0700    P.O. 5428 938 0254 240    I.V. (mL/kg)   977.5 (16.6)     Total Intake(mL/kg) 1200 (20.7) 342 (5.9) 1997.5 (33.9) 240 (4.1)    Urine (mL/kg/hr)  3850 (2.8) 3500 (2.5) 650 (1.1)    Stool    0    Total Output  3850 3500 650    Net +1200 -3508 -1502.5 -410            Urine Unmeasured Occurrence 1 x 3 x 1 x     Stool Unmeasured Occurrence    1 x           Lines, Drains, Airways, & Wounds       Active LDAs       Name Placement date Placement time Site Days Last dressing change    Peripheral IV 07/25/25 1435 20 G Anterior;Left Forearm 07/25/25  1435  Forearm  1     Peripheral IV  07/25/25 1621 20 G Anterior;Right Forearm 07/25/25  1621  Forearm  1     Wound 12/25/22 1721 Right upper arm 12/25/22  1721  -- 944                       Nutrition Focused Physical Exam     Trending NFPE 07/29/25  Completed and consistent with malnutrition diagnosis.      Malnutrition Severity Assessment      Patient meets criteria for : Moderate (non-severe) Malnutrition  Malnutrition Type (Last 8 Hours)       Malnutrition Severity Assessment       Row Name 07/29/25 1708       Malnutrition Severity Assessment    Malnutrition Type Chronic Disease - Related Malnutrition      Row Name 07/29/25 1708       Insufficient Energy Intake     Insufficient Energy Intake Findings Moderate    Insufficient Energy Intake  <75% of est. energy requirement for > or equal to 1 month      Row Name 07/29/25 1708       Muscle Loss    Loss of Muscle Mass Findings Severe    [place] Region Moderate - slight depression    Clavicle Bone Region Moderate - some protrusion in females, visible in males    Acromion Bone Region Moderate - acromion may slightly protrude    Scapular Bone Region Moderate - mild depression, bones may show slightly    Dorsal Hand Region Severe - prominent depression    Patellar Region Severe - prominent bone, square looking, very little muscle definition    Anterior Thigh Region Severe - line/depression along thigh, obviously thin    Posterior Calf Region Severe - thin with very little definition/firmness      Row Name 07/29/25 1708       Fat Loss    Subcutaneous Fat Loss Findings Severe    Orbital Region  Severe - pronounced hollowness/depression, dark circles, loose saggy skin    Upper Arm Region Severe - mostly skin, very little space between folds, fingers touch      Row Name 07/29/25 1708       Fluid Accumulation (Edema)    Fluid Acumulation Findings Mild    Fluid Accumulation  Mild equals 1+ pitting edema      Row Name 07/29/25 1708       Criteria Met (Must meet criteria for severity in at least 2 of these  categories: M Wasting, Fat Loss, Fluid, Secondary Signs, Wt. Status, Intake)    Patient meets criteria for  Moderate (non-severe) Malnutrition                   (1)   Current Nutrition Orders & Evaluation of Intake      Oral Nutrition     Food Allergies  and Intolerances NKFA   Current PO Diet Diet: Regular/House; Fluid Consistency: Thin (IDDSI 0)   Oral Nutrition Supplement Boost Original BID, drinking 100%    Trending % PO Intake 56% avg x 6 meals   (2)  Assessment & Plan   Nutrition Diagnosis and Goals       Nutrition Diagnosis 1 Moderate chronic disease or condition related malnutrition related to SAH, COPD, hypertension, and GERD as evidence by po intakes and NFPE results.        Goal(s) Increase PO Intake , Accepts Oral Nutrition Supplement, and No Significant Weight Loss     Nutrition Intervention and Prescription       Intervention  Adjust oral nutrition supplement, Continue to monitor for plan of care, Continue with current interventions, and Completed NFPE      Diet Prescription     Supplement Prescription Boost Original BID    Education Provided     (3)  Monitoring/Evaluation       Monitor/Evaluation Per Protocol, PO Intake, Oral Nutrition Supplement Intake, and Weight     RD Follow-Up Encounter 3-5 days     Electronically signed by:  Margo Carrasco RD  07/29/25 17:13 EDT

## 2025-07-29 NOTE — PLAN OF CARE
Vss. Iv abx continue. Patient orientated to person only.  Problem: Adult Inpatient Plan of Care  Goal: Plan of Care Review  Outcome: Progressing  Goal: Patient-Specific Goal (Individualized)  Outcome: Progressing  Goal: Absence of Hospital-Acquired Illness or Injury  Outcome: Progressing  Intervention: Identify and Manage Fall Risk  Recent Flowsheet Documentation  Taken 7/29/2025 1657 by Soledad Castanon RN  Safety Promotion/Fall Prevention:   safety round/check completed   nonskid shoes/slippers when out of bed   fall prevention program maintained  Taken 7/29/2025 1434 by Soledad Castanon RN  Safety Promotion/Fall Prevention:   fall prevention program maintained   safety round/check completed   nonskid shoes/slippers when out of bed  Taken 7/29/2025 1212 by Soledad Castanon RN  Safety Promotion/Fall Prevention:   safety round/check completed   nonskid shoes/slippers when out of bed   fall prevention program maintained  Taken 7/29/2025 1018 by Soledad Castanon RN  Safety Promotion/Fall Prevention:   safety round/check completed   nonskid shoes/slippers when out of bed   fall prevention program maintained  Taken 7/29/2025 0823 by Soledad Castanon RN  Safety Promotion/Fall Prevention:   fall prevention program maintained   safety round/check completed   nonskid shoes/slippers when out of bed  Intervention: Prevent Skin Injury  Recent Flowsheet Documentation  Taken 7/29/2025 0823 by Soledad Castanon RN  Body Position: position changed independently  Intervention: Prevent and Manage VTE (Venous Thromboembolism) Risk  Recent Flowsheet Documentation  Taken 7/29/2025 0823 by Soledad Castanon RN  VTE Prevention/Management: SCDs (sequential compression devices) off  Goal: Optimal Comfort and Wellbeing  Outcome: Progressing  Intervention: Provide Person-Centered Care  Recent Flowsheet Documentation  Taken 7/29/2025 1434 by Soledad Castanon RN  Trust Relationship/Rapport: care explained  Taken 7/29/2025  0823 by Soledad Castanon, RN  Trust Relationship/Rapport: care explained  Goal: Readiness for Transition of Care  Outcome: Progressing

## 2025-07-29 NOTE — PROGRESS NOTES
Name: Devin Scales ADMIT: 2025   : 1940  PCP: Meron Starks DO    MRN: [id number] LOS: 4 days   AGE/SEX: 84 y.o. male  ROOM: [facility]   Subjective   Chief Complaint   Patient presents with    Fall     Head lac, L leg swelling and redness   Multiple falls      On abx  Previously was having urinary retention with previous I/O cath  Partida placed now  On IVFs    ROS  No f/c  No n/v  No cp/palp  No soa/cough    Objective   Vital Signs  Temp:  [97.3 °F (36.3 °C)-97.9 °F (36.6 °C)] 97.5 °F (36.4 °C)  Heart Rate:  [65-81] 65  Resp:  [16-20] 17  BP: (110-135)/(63-77) 135/71  SpO2:  [99 %-100 %] 100 %  on   ;   Device (Oxygen Therapy): room air  Body mass index is 19.94 kg/m².    Physical Exam  HENT:      Head: Normocephalic and atraumatic.   Eyes:      General: No scleral icterus.  Cardiovascular:      Rate and Rhythm: Normal rate and regular rhythm.   Pulmonary:      Effort: Pulmonary effort is normal. No respiratory distress.   Abdominal:      General: There is no distension.      Palpations: Abdomen is soft.      Tenderness: There is no abdominal tenderness.   Musculoskeletal:      Cervical back: Neck supple.   Neurological:      Mental Status: He is alert.   Psychiatric:         Behavior: Behavior normal.     Elderly, some poor memory, chronically ill  Cellulitis appears improved  +partida    Results Review:       I reviewed the patient's new clinical results.  Results from last 7 days   Lab Units 25  0535 25  0627 25  1435   WBC 10*3/mm3 8.20 9.19 10.10   HEMOGLOBIN g/dL 8.9* 10.8* 10.5*   PLATELETS 10*3/mm3 183 266 286     Results from last 7 days   Lab Units 25  0535 25  0835 25  0611 25  0627   SODIUM mmol/L 140 145 142 144   POTASSIUM mmol/L 3.9 4.1 4.2 4.7   CHLORIDE mmol/L 109* 109* 106 108*   CO2 mmol/L 21.2* 23.2 20.0* 24.0   BUN mg/dL 24.0* 29.0* 33.0* 34.0*   CREATININE mg/dL 2.39* 2.86* 3.51* 2.93*   GLUCOSE mg/dL 85 112* 130* 100*   Estimated  "Creatinine Clearance: 19.9 mL/min (A) (by C-G formula based on SCr of 2.39 mg/dL (H)).  Results from last 7 days   Lab Units 07/29/25  0535 07/28/25  0835 07/27/25  0611 07/25/25  1435   ALBUMIN g/dL 2.2* 2.8* 3.5 3.4*   BILIRUBIN mg/dL  --   --   --  0.5   ALK PHOS U/L  --   --   --  64   AST (SGOT) U/L  --   --   --  23   ALT (SGPT) U/L  --   --   --  19     Results from last 7 days   Lab Units 07/29/25  0535 07/28/25  0835 07/27/25  0611 07/26/25  0627 07/25/25  1435   CALCIUM mg/dL 7.9* 8.4* 8.9 8.9 9.2   ALBUMIN g/dL 2.2* 2.8* 3.5  --  3.4*   MAGNESIUM mg/dL 1.8  --   --  2.1  --    PHOSPHORUS mg/dL 3.3 4.1 3.7 4.8*  --          Coag   Results from last 7 days   Lab Units 07/26/25  0627   INR  1.21*   APTT seconds 27.5     HbA1C No results found for: \"HGBA1C\"  Infection     Radiology(recent) No radiology results for the last day  No results found for: \"TROPONINT\", \"TROPONINI\", \"BNP\"  No components found for: \"TSH;2\"    arformoterol, 15 mcg, Nebulization, BID - RT  aspirin, 81 mg, Oral, Daily  ceFAZolin, 2,000 mg, Intravenous, Q12H  mupirocin, 1 Application, Each Nare, BID  senna-docusate sodium, 2 tablet, Oral, BID  tamsulosin, 0.4 mg, Oral, Daily      sodium chloride, 75 mL/hr, Last Rate: 75 mL/hr (07/29/25 0503)    Diet: Regular/House; Fluid Consistency: Thin (IDDSI 0)      Assessment & Plan      Active Hospital Problems    Diagnosis  POA    **Subarachnoid hematoma [S06.6XAA]  Yes    Cellulitis of left lower extremity [L03.116]  Unknown    SAH (subarachnoid hemorrhage) [I60.9]  Yes    Cigarette smoker [F17.210]  Yes    COPD (chronic obstructive pulmonary disease) [J44.9]  Yes    Peripheral vascular disease [I73.9]  Yes      Resolved Hospital Problems   No resolved problems to display.     83 yo who presented with a fall and SAH. Seen by MARTHA who recommended conservative management    STEVENSON- nephrology following. On IVFs. Moctezuma placed for urinary retention. Renal fx improving  On cefazolin for cellulitis which " appears improving  On bronchodilators for COPD without exacerbation  Add daily thiamine   Therapy to work on getting stronger      Dispo- probably to rehab in a few days when renal fx improved      Ga Whitmore MD  [facility]  07/29/25  16:13 EDT

## 2025-07-29 NOTE — DISCHARGE PLACEMENT REQUEST
"Devin Scales (84 y.o. Male)       Date of Birth   1940    Social Security Number       Address   [address] [place] [address]    Home Phone   [phone]    MRN   [id number]       Sabianist   None    Marital Status   Single                            Admission Date   7/25/2025    Admission Type   Emergency    Admitting Provider   Geovany Olsen MD    Attending Provider   Ga Whitmore MD    Department, Room/Bed   [address], [address]       Discharge Date       Discharge Disposition       Discharge Destination                                 Attending Provider: Ga Whitmore MD    Allergies: No Known Allergies    Isolation: None   Infection: None   Code Status: CPR    Ht: 172 cm (67.72\")   Wt: 59 kg (130 lb 1.1 oz)    Admission Cmt: None   Principal Problem: Subarachnoid hematoma [S06.6XAA]                   Active Insurance as of 7/25/2025       Primary Coverage       Payor Plan Insurance Group Employer/Plan Group    MEDICARE MEDICARE A & B        Payor Plan Address Payor Plan Phone Number Payor Plan Fax Number Effective Dates    [address] [phone]  8/1/2007 - None Entered    [place] [address]         Subscriber Name Subscriber Birth Date Member ID       DEVIN SCALES 1940 [id number]                     Emergency Contacts        (Rel.) Home Phone Work Phone Mobile Phone    DEVIN SCALES (Son) -- -- [phone]    SilvioCorinna balli (Daughter) -- -- [phone]                "

## 2025-07-29 NOTE — SIGNIFICANT NOTE
07/29/25 1622   OTHER   Discipline physical therapist   Rehab Time/Intention   Session Not Performed other (see comments)  (attempted to see, pt w BR needs and needing to be cleaned up. will follow up tomorrow.)   Recommendation   PT - Next Appointment 07/30/25

## 2025-07-29 NOTE — PLAN OF CARE
Problem: Violence Risk or Actual  Goal: Anger and Impulse Control  Outcome: Progressing  Intervention: Minimize Safety Risk  Recent Flowsheet Documentation  Taken 7/29/2025 0422 by Gail Vance RN  Enhanced Safety Measures: bed alarm set  Taken 7/29/2025 0200 by Gail Vance RN  Enhanced Safety Measures:   bed alarm set   room near unit station  Taken 7/29/2025 0000 by Gail Vance RN  Enhanced Safety Measures:   bed alarm set   room near unit station  Taken 7/28/2025 2209 by Gail Vance RN  Enhanced Safety Measures:   bed alarm set   room near unit station  Taken 7/28/2025 2013 by Gail Vance RN  Sensory Stimulation Regulation: care clustered  Taken 7/28/2025 2000 by Gail Vance RN  Enhanced Safety Measures:   bed alarm set   room near unit station     Problem: Adult Inpatient Plan of Care  Goal: Plan of Care Review  Outcome: Progressing  Flowsheets (Taken 7/29/2025 0508)  Progress: improving  Plan of Care Reviewed With: patient  Goal: Patient-Specific Goal (Individualized)  Outcome: Progressing  Goal: Absence of Hospital-Acquired Illness or Injury  Outcome: Progressing  Intervention: Identify and Manage Fall Risk  Recent Flowsheet Documentation  Taken 7/29/2025 0422 by Gail Vance RN  Safety Promotion/Fall Prevention:   clutter free environment maintained   fall prevention program maintained   nonskid shoes/slippers when out of bed   room organization consistent   safety round/check completed  Taken 7/29/2025 0200 by Gail Vance, RN  Safety Promotion/Fall Prevention:   clutter free environment maintained   fall prevention program maintained   nonskid shoes/slippers when out of bed   room organization consistent   safety round/check completed  Taken 7/29/2025 0000 by Gail Vance, RN  Safety Promotion/Fall Prevention:   clutter free environment maintained   fall prevention program maintained   nonskid shoes/slippers when out of bed   room organization  consistent   safety round/check completed  Taken 7/28/2025 2209 by Gail Vance RN  Safety Promotion/Fall Prevention:   clutter free environment maintained   activity supervised   fall prevention program maintained   nonskid shoes/slippers when out of bed   room organization consistent   safety round/check completed  Taken 7/28/2025 2000 by Gail Vance RN  Safety Promotion/Fall Prevention:   clutter free environment maintained   fall prevention program maintained   nonskid shoes/slippers when out of bed   room organization consistent   safety round/check completed  Intervention: Prevent Skin Injury  Recent Flowsheet Documentation  Taken 7/29/2025 0422 by Gail Vance RN  Body Position:   position changed independently   foot of bed elevated  Taken 7/29/2025 0200 by Gail Vance RN  Body Position:   position changed independently   tilted   right   foot of bed elevated  Taken 7/29/2025 0000 by Gail Vance RN  Body Position:   position changed independently   tilted   left   foot of bed elevated  Taken 7/28/2025 2209 by Gail Vance RN  Body Position:   position changed independently   tilted   right  Taken 7/28/2025 2000 by Gail Vance RN  Body Position:   position changed independently   sitting up in bed  Skin Protection: transparent dressing maintained  Intervention: Prevent and Manage VTE (Venous Thromboembolism) Risk  Recent Flowsheet Documentation  Taken 7/28/2025 2000 by Gail Vance RN  VTE Prevention/Management:   bilateral   SCDs (sequential compression devices) off  Intervention: Prevent Infection  Recent Flowsheet Documentation  Taken 7/29/2025 0422 by Gail Vance RN  Infection Prevention:   rest/sleep promoted   single patient room provided  Taken 7/29/2025 0200 by Gail Vance RN  Infection Prevention: rest/sleep promoted  Taken 7/29/2025 0000 by Gail Vance RN  Infection Prevention:   environmental surveillance performed    rest/sleep promoted   single patient room provided  Taken 7/28/2025 2209 by Gail Vance RN  Infection Prevention:   environmental surveillance performed   hand hygiene promoted   personal protective equipment utilized   rest/sleep promoted   single patient room provided  Taken 7/28/2025 2000 by Gail Vance RN  Infection Prevention:   environmental surveillance performed   rest/sleep promoted   single patient room provided  Goal: Optimal Comfort and Wellbeing  Outcome: Progressing  Intervention: Monitor Pain and Promote Comfort  Recent Flowsheet Documentation  Taken 7/28/2025 2209 by Gail Vance RN  Pain Management Interventions: pain medication given  Intervention: Provide Person-Centered Care  Recent Flowsheet Documentation  Taken 7/28/2025 2013 by Gail Vance RN  Trust Relationship/Rapport:   care explained   questions encouraged   questions answered  Goal: Readiness for Transition of Care  Outcome: Progressing     Problem: Fall Injury Risk  Goal: Absence of Fall and Fall-Related Injury  Outcome: Progressing  Intervention: Identify and Manage Contributors  Recent Flowsheet Documentation  Taken 7/28/2025 2209 by Gail Vance RN  Medication Review/Management: medications reviewed  Self-Care Promotion:   BADL personal objects within reach   BADL personal routines maintained   independence encouraged  Taken 7/28/2025 2000 by Gail Vance RN  Medication Review/Management: medications reviewed  Self-Care Promotion:   BADL personal objects within reach   BADL personal routines maintained  Intervention: Promote Injury-Free Environment  Recent Flowsheet Documentation  Taken 7/29/2025 0422 by Gail Vance, RN  Safety Promotion/Fall Prevention:   clutter free environment maintained   fall prevention program maintained   nonskid shoes/slippers when out of bed   room organization consistent   safety round/check completed  Taken 7/29/2025 0200 by Gail Vance, RN  Safety  Promotion/Fall Prevention:   clutter free environment maintained   fall prevention program maintained   nonskid shoes/slippers when out of bed   room organization consistent   safety round/check completed  Taken 7/29/2025 0000 by Gail Vance, RN  Safety Promotion/Fall Prevention:   clutter free environment maintained   fall prevention program maintained   nonskid shoes/slippers when out of bed   room organization consistent   safety round/check completed  Taken 7/28/2025 2209 by Gail Vance, RN  Safety Promotion/Fall Prevention:   clutter free environment maintained   activity supervised   fall prevention program maintained   nonskid shoes/slippers when out of bed   room organization consistent   safety round/check completed  Taken 7/28/2025 2000 by Gail Vance, RN  Safety Promotion/Fall Prevention:   clutter free environment maintained   fall prevention program maintained   nonskid shoes/slippers when out of bed   room organization consistent   safety round/check completed     Problem: Skin Injury Risk Increased  Goal: Skin Health and Integrity  Outcome: Progressing  Intervention: Optimize Skin Protection  Recent Flowsheet Documentation  Taken 7/29/2025 0422 by Gail Vance, RN  Activity Management: activity minimized  Head of Bed (HOB) Positioning: HOB at 15 degrees  Taken 7/29/2025 0200 by Gail Vance RN  Activity Management: activity minimized  Head of Bed (HOB) Positioning: HOB at 20-30 degrees  Taken 7/29/2025 0000 by Gail Vance, RN  Activity Management: activity minimized  Head of Bed (HOB) Positioning: HOB at 20-30 degrees  Taken 7/28/2025 2209 by Gail Vance, RN  Activity Management: activity minimized  Head of Bed (HOB) Positioning: HOB at 20-30 degrees  Taken 7/28/2025 2000 by Gail Vance, RN  Activity Management: activity minimized  Pressure Reduction Techniques:   frequent weight shift encouraged   heels elevated off bed  Head of Bed (HOB) Positioning:  HOB at 20-30 degrees  Pressure Reduction Devices:   positioning supports utilized   pressure-redistributing mattress utilized  Skin Protection: transparent dressing maintained     Problem: Comorbidity Management  Goal: Blood Pressure in Desired Range  Outcome: Progressing  Intervention: Maintain Blood Pressure Management  Recent Flowsheet Documentation  Taken 7/28/2025 2209 by Gail Vance, RN  Medication Review/Management: medications reviewed  Taken 7/28/2025 2000 by Gail Vance, RN  Medication Review/Management: medications reviewed   Goal Outcome Evaluation:  Plan of Care Reviewed With: patient        Progress: improving

## 2025-07-29 NOTE — PROGRESS NOTES
[facility] Progress Note      Patient Name: Devin Scales  : 1940  MRN: [id number]  Primary Care Physician:  Meron Starks DO  Date of admission: 2025    Subjective     Interval History:   Follow-up acute kidney injury on CKD of unknown baseline.  Catheter in place.  Urine output 3.5 L.  Weight inconsistent.  Blood pressure stable.  On room air.  Confused.  Review of Systems:   As noted above    Objective     Vitals:   Temp:  [97.3 °F (36.3 °C)-97.9 °F (36.6 °C)] 97.5 °F (36.4 °C)  Heart Rate:  [65-81] 65  Resp:  [16-20] 17  BP: (110-135)/(63-77) 135/71    Intake/Output Summary (Last 24 hours) at 2025 1308  Last data filed at 2025 0940  Gross per 24 hour   Intake 1997.5 ml   Output 4150 ml   Net -2152.5 ml       Physical Exam:    General: Chronically ill.  Frail.  Confused.  HEENT: No scleral icterus.  Right brow suture  Neck: Supple,  trachea at midline, no JVD  Heart: Regular rate and rhythm.  No S3 or rub.  Lungs: Clear to auscultation bilaterally.  Unlabored on room air.  Abd:BS +, distended, soft, nontender   : Moctezuma catheter in place  Extremities: Trace lower extremity edema.  Neurologic: Intentional.  Confused.  Skin: Warm and dry; less erythema  left lower leg/foot  .     Scheduled Meds:     arformoterol, 15 mcg, Nebulization, BID - RT  aspirin, 81 mg, Oral, Daily  ceFAZolin, 2,000 mg, Intravenous, Q12H  mupirocin, 1 Application, Each Nare, BID  senna-docusate sodium, 2 tablet, Oral, BID  tamsulosin, 0.4 mg, Oral, Daily      IV Meds:   sodium chloride, 75 mL/hr, Last Rate: 75 mL/hr (25 0503)        Results Reviewed:   I have personally reviewed the results from the time of this admission to 2025 13:08 EDT     Results from last 7 days   Lab Units 25  0535 25  0835 25  0611 25  0627 25  1435   SODIUM mmol/L 140 145 142   < > 141   POTASSIUM mmol/L 3.9 4.1 4.2   < > 5.0   CHLORIDE mmol/L 109* 109* 106   < > 104    CO2 mmol/L 21.2* 23.2 20.0*   < > 26.0   BUN mg/dL 24.0* 29.0* 33.0*   < > 34.0*   CREATININE mg/dL 2.39* 2.86* 3.51*   < > 3.05*   CALCIUM mg/dL 7.9* 8.4* 8.9   < > 9.2   BILIRUBIN mg/dL  --   --   --   --  0.5   ALK PHOS U/L  --   --   --   --  64   ALT (SGPT) U/L  --   --   --   --  19   AST (SGOT) U/L  --   --   --   --  23   GLUCOSE mg/dL 85 112* 130*   < > 97    < > = values in this interval not displayed.       Estimated Creatinine Clearance: 19.9 mL/min (A) (by C-G formula based on SCr of 2.39 mg/dL (H)).    Results from last 7 days   Lab Units 07/29/25  0535 07/28/25  0835 07/27/25  0611 07/26/25  0627   MAGNESIUM mg/dL 1.8  --   --  2.1   PHOSPHORUS mg/dL 3.3 4.1 3.7 4.8*       Results from last 7 days   Lab Units 07/27/25  0611 07/26/25  2111   URIC ACID mg/dL 6.0 6.3       Results from last 7 days   Lab Units 07/29/25  0535 07/26/25  0627 07/25/25  1435   WBC 10*3/mm3 8.20 9.19 10.10   HEMOGLOBIN g/dL 8.9* 10.8* 10.5*   PLATELETS 10*3/mm3 183 266 286       Results from last 7 days   Lab Units 07/26/25  0627   INR  1.21*       Assessment / Plan     ASSESSMENT:  STEVENSON versus STEVENSON/CKD, nonoliguric, improving.  Initially prerenal progressing to ATN.  Bladder outlet obstruction now status post Moctezuma catheter.  Waste products improving.  Dysuria/BPH, retention.  Moctezuma catheter was anchored 7/28/25.  Recent fall with subarachnoid hemorrhage.  Conservative management.  Cellulitis left lower leg.  On Kefzol day 4 of planned of 5  COPD  Malnutrition diet with supplements.  Anemia with low iron saturation    PLAN:  Discontinue IV fluid.    Thank you for involving us in the care of Devin Scales.  Please feel free to call with any questions.    Lisset Navarro MD  07/29/25  13:08 EDT    [facility]  [phone]    Please note that portions of this note were completed with a voice recognition program.

## 2025-07-30 PROBLEM — E44.0 MODERATE PROTEIN-CALORIE MALNUTRITION: Status: ACTIVE | Noted: 2025-07-30

## 2025-07-30 LAB
ALBUMIN SERPL-MCNC: 2.5 G/DL (ref 3.5–5.2)
ANION GAP SERPL CALCULATED.3IONS-SCNC: 9 MMOL/L (ref 5–15)
BUN SERPL-MCNC: 23 MG/DL (ref 8–23)
BUN/CREAT SERPL: 14.5 (ref 7–25)
CALCIUM SPEC-SCNC: 7.7 MG/DL (ref 8.6–10.5)
CHLORIDE SERPL-SCNC: 109 MMOL/L (ref 98–107)
CO2 SERPL-SCNC: 23 MMOL/L (ref 22–29)
CREAT SERPL-MCNC: 1.59 MG/DL (ref 0.76–1.27)
EGFRCR SERPLBLD CKD-EPI 2021: 43.1 ML/MIN/1.73
GLUCOSE SERPL-MCNC: 77 MG/DL (ref 65–99)
PHOSPHATE SERPL-MCNC: 2.3 MG/DL (ref 2.5–4.5)
POTASSIUM SERPL-SCNC: 3.5 MMOL/L (ref 3.5–5.2)
SODIUM SERPL-SCNC: 141 MMOL/L (ref 136–145)

## 2025-07-30 PROCEDURE — 25010000002 CEFAZOLIN PER 500 MG: Performed by: HOSPITALIST

## 2025-07-30 PROCEDURE — 94760 N-INVAS EAR/PLS OXIMETRY 1: CPT

## 2025-07-30 PROCEDURE — 94664 DEMO&/EVAL PT USE INHALER: CPT

## 2025-07-30 PROCEDURE — 94799 UNLISTED PULMONARY SVC/PX: CPT

## 2025-07-30 PROCEDURE — 80069 RENAL FUNCTION PANEL: CPT | Performed by: INTERNAL MEDICINE

## 2025-07-30 PROCEDURE — 94761 N-INVAS EAR/PLS OXIMETRY MLT: CPT

## 2025-07-30 PROCEDURE — 25810000003 SODIUM CHLORIDE 0.9 % SOLUTION: Performed by: INTERNAL MEDICINE

## 2025-07-30 RX ORDER — FENTANYL/ROPIVACAINE/NS/PF 2-625MCG/1
15 PLASTIC BAG, INJECTION (ML) EPIDURAL ONCE
Status: COMPLETED | OUTPATIENT
Start: 2025-07-30 | End: 2025-07-30

## 2025-07-30 RX ADMIN — MUPIROCIN 1 APPLICATION: 20 OINTMENT TOPICAL at 08:04

## 2025-07-30 RX ADMIN — ARFORMOTEROL TARTRATE 15 MCG: 15 SOLUTION RESPIRATORY (INHALATION) at 21:15

## 2025-07-30 RX ADMIN — ARFORMOTEROL TARTRATE 15 MCG: 15 SOLUTION RESPIRATORY (INHALATION) at 08:30

## 2025-07-30 RX ADMIN — ASPIRIN 81 MG: 81 TABLET, COATED ORAL at 08:04

## 2025-07-30 RX ADMIN — POTASSIUM PHOSPHATE 15 MMOL: 236; 224 INJECTION, SOLUTION INTRAVENOUS at 10:40

## 2025-07-30 RX ADMIN — CEFAZOLIN 2000 MG: 2 INJECTION, POWDER, FOR SOLUTION INTRAMUSCULAR; INTRAVENOUS at 05:09

## 2025-07-30 RX ADMIN — CEFAZOLIN 2000 MG: 2 INJECTION, POWDER, FOR SOLUTION INTRAMUSCULAR; INTRAVENOUS at 16:15

## 2025-07-30 RX ADMIN — TAMSULOSIN HYDROCHLORIDE 0.4 MG: 0.4 CAPSULE ORAL at 08:04

## 2025-07-30 RX ADMIN — Medication 100 MG: at 08:04

## 2025-07-30 RX ADMIN — SENNOSIDES AND DOCUSATE SODIUM 2 TABLET: 50; 8.6 TABLET ORAL at 20:21

## 2025-07-30 NOTE — PLAN OF CARE
VSS. No acute complaints.   Problem: Violence Risk or Actual  Goal: Anger and Impulse Control  Outcome: Progressing  Intervention: Minimize Safety Risk  Recent Flowsheet Documentation  Taken 7/30/2025 1605 by Elsi Bell RN  Enhanced Safety Measures: bed alarm set  Taken 7/30/2025 1406 by Elsi Bell RN  Enhanced Safety Measures: bed alarm set  Taken 7/30/2025 1200 by Elsi Bell RN  Enhanced Safety Measures: bed alarm set  Taken 7/30/2025 1003 by Elsi Bell RN  Enhanced Safety Measures: bed alarm set  Taken 7/30/2025 0804 by Elsi Bell RN  Enhanced Safety Measures: bed alarm set     Problem: Adult Inpatient Plan of Care  Goal: Plan of Care Review  Outcome: Progressing  Goal: Patient-Specific Goal (Individualized)  Outcome: Progressing  Goal: Absence of Hospital-Acquired Illness or Injury  Outcome: Progressing  Intervention: Identify and Manage Fall Risk  Recent Flowsheet Documentation  Taken 7/30/2025 1605 by Elsi Bell RN  Safety Promotion/Fall Prevention:   clutter free environment maintained   safety round/check completed  Taken 7/30/2025 1406 by Elsi Bell RN  Safety Promotion/Fall Prevention:   clutter free environment maintained   safety round/check completed  Taken 7/30/2025 1200 by Elsi Bell RN  Safety Promotion/Fall Prevention:   clutter free environment maintained   safety round/check completed  Taken 7/30/2025 1003 by Elsi Bell RN  Safety Promotion/Fall Prevention:   clutter free environment maintained   safety round/check completed  Taken 7/30/2025 0804 by Elsi Bell RN  Safety Promotion/Fall Prevention:   clutter free environment maintained   safety round/check completed  Intervention: Prevent Skin Injury  Recent Flowsheet Documentation  Taken 7/30/2025 1605 by Elsi Bell RN  Body Position: position changed independently  Taken 7/30/2025 1406 by Elsi Bell RN  Body Position: sitting up in bed  Taken 7/30/2025  1200 by Ray, Elsi, RN  Body Position: sitting up in bed  Taken 7/30/2025 1003 by Elsi Bell RN  Body Position: sitting up in bed  Taken 7/30/2025 0804 by Elsi Bell RN  Body Position: sitting up in bed  Intervention: Prevent and Manage VTE (Venous Thromboembolism) Risk  Recent Flowsheet Documentation  Taken 7/30/2025 0804 by Elsi Bell RN  VTE Prevention/Management: SCDs (sequential compression devices) off  Intervention: Prevent Infection  Recent Flowsheet Documentation  Taken 7/30/2025 1605 by Elsi Bell RN  Infection Prevention:   environmental surveillance performed   single patient room provided  Taken 7/30/2025 1406 by Elsi Bell RN  Infection Prevention:   environmental surveillance performed   single patient room provided  Taken 7/30/2025 1200 by Elsi Bell RN  Infection Prevention:   environmental surveillance performed   single patient room provided  Taken 7/30/2025 1003 by Elsi Bell RN  Infection Prevention:   environmental surveillance performed   single patient room provided  Taken 7/30/2025 0804 by Elsi Bell RN  Infection Prevention:   environmental surveillance performed   single patient room provided  Goal: Optimal Comfort and Wellbeing  Outcome: Progressing  Intervention: Provide Person-Centered Care  Recent Flowsheet Documentation  Taken 7/30/2025 1406 by Elsi Bell RN  Trust Relationship/Rapport:   care explained   thoughts/feelings acknowledged  Taken 7/30/2025 0804 by Elsi Bell RN  Trust Relationship/Rapport:   care explained   thoughts/feelings acknowledged  Goal: Readiness for Transition of Care  Outcome: Progressing     Problem: Fall Injury Risk  Goal: Absence of Fall and Fall-Related Injury  Outcome: Progressing  Intervention: Promote Injury-Free Environment  Recent Flowsheet Documentation  Taken 7/30/2025 1605 by Elsi Bell RN  Safety Promotion/Fall Prevention:   clutter free environment  maintained   safety round/check completed  Taken 7/30/2025 1406 by Elsi Bell RN  Safety Promotion/Fall Prevention:   clutter free environment maintained   safety round/check completed  Taken 7/30/2025 1200 by Elsi Bell RN  Safety Promotion/Fall Prevention:   clutter free environment maintained   safety round/check completed  Taken 7/30/2025 1003 by Elsi Bell RN  Safety Promotion/Fall Prevention:   clutter free environment maintained   safety round/check completed  Taken 7/30/2025 0804 by Elsi Bell RN  Safety Promotion/Fall Prevention:   clutter free environment maintained   safety round/check completed     Problem: Skin Injury Risk Increased  Goal: Skin Health and Integrity  Outcome: Progressing  Intervention: Optimize Skin Protection  Recent Flowsheet Documentation  Taken 7/30/2025 1605 by Elsi Bell RN  Head of Bed (HOB) Positioning: HOB at 30 degrees  Taken 7/30/2025 1406 by Elsi Bell RN  Head of Bed (HOB) Positioning: HOB at 20-30 degrees  Taken 7/30/2025 1200 by Elsi Bell RN  Head of Bed (HOB) Positioning: HOB at 30 degrees  Taken 7/30/2025 1003 by Elsi Bell RN  Head of Bed (HOB) Positioning: HOB at 20-30 degrees  Taken 7/30/2025 0804 by Elsi Bell RN  Head of Bed (HOB) Positioning: HOB at 30 degrees     Problem: Comorbidity Management  Goal: Blood Pressure in Desired Range  Outcome: Progressing   Goal Outcome Evaluation:

## 2025-07-30 NOTE — PLAN OF CARE
Problem: Violence Risk or Actual  Goal: Anger and Impulse Control  Outcome: Progressing  Intervention: Minimize Safety Risk  Recent Flowsheet Documentation  Taken 7/30/2025 0419 by Gail Vance RN  Enhanced Safety Measures: bed alarm set  Taken 7/30/2025 0200 by Gail Vance RN  Enhanced Safety Measures: bed alarm set  Taken 7/30/2025 0000 by Gail Vance RN  Enhanced Safety Measures:   bed alarm refused   room near unit station  Taken 7/29/2025 2230 by Gail Vance RN  Enhanced Safety Measures:   bed alarm set   room near unit station  Taken 7/29/2025 2000 by Gail Vance RN  Sensory Stimulation Regulation:   care clustered   lighting decreased   television on  Enhanced Safety Measures: chair alarm set  Intervention: Promote Self-Control  Recent Flowsheet Documentation  Taken 7/29/2025 2000 by Gail Vance RN  Supportive Measures: active listening utilized  Environmental Support: calm environment promoted     Problem: Adult Inpatient Plan of Care  Goal: Plan of Care Review  Outcome: Progressing  Flowsheets  Taken 7/30/2025 0450  Outcome Evaluation: No issues this shift, no complaints voiced. Waiting on SNF placement.  Plan of Care Reviewed With: patient  Taken 7/29/2025 0508  Progress: improving  Goal: Patient-Specific Goal (Individualized)  Outcome: Progressing  Goal: Absence of Hospital-Acquired Illness or Injury  Outcome: Progressing  Intervention: Identify and Manage Fall Risk  Recent Flowsheet Documentation  Taken 7/30/2025 0419 by Gail Vance RN  Safety Promotion/Fall Prevention:   safety round/check completed   room organization consistent   nonskid shoes/slippers when out of bed   fall prevention program maintained   clutter free environment maintained  Taken 7/30/2025 0200 by Gail Vance RN  Safety Promotion/Fall Prevention:   safety round/check completed   room organization consistent   nonskid shoes/slippers when out of bed   fall prevention program  maintained   clutter free environment maintained  Taken 7/30/2025 0000 by Gail Vance RN  Safety Promotion/Fall Prevention:   clutter free environment maintained   fall prevention program maintained   nonskid shoes/slippers when out of bed   room organization consistent   safety round/check completed  Taken 7/29/2025 2230 by Gail Vance RN  Safety Promotion/Fall Prevention:   clutter free environment maintained   fall prevention program maintained   nonskid shoes/slippers when out of bed   room organization consistent   safety round/check completed  Taken 7/29/2025 2000 by Gail Vance RN  Safety Promotion/Fall Prevention:   assistive device/personal items within reach   clutter free environment maintained   fall prevention program maintained   nonskid shoes/slippers when out of bed   safety round/check completed   room organization consistent  Intervention: Prevent Skin Injury  Recent Flowsheet Documentation  Taken 7/30/2025 0419 by Gail Vance RN  Body Position:   position changed independently   weight shifting   legs elevated  Taken 7/30/2025 0200 by Gail Vance RN  Body Position:   position changed independently   tilted   right  Taken 7/30/2025 0000 by Gail Vance RN  Body Position: weight shifting  Taken 7/29/2025 2230 by Gail Vance RN  Body Position:   position changed independently   sitting up in bed  Taken 7/29/2025 2000 by Gail Vance RN  Body Position:   sitting up in bed   position changed independently  Intervention: Prevent and Manage VTE (Venous Thromboembolism) Risk  Recent Flowsheet Documentation  Taken 7/29/2025 2000 by Gail Vance RN  VTE Prevention/Management:   bilateral   SCDs (sequential compression devices) off   patient refused intervention  Intervention: Prevent Infection  Recent Flowsheet Documentation  Taken 7/30/2025 0419 by Gail Vance RN  Infection Prevention:   rest/sleep promoted   single patient room  provided  Taken 7/30/2025 0200 by Gail Vance RN  Infection Prevention: rest/sleep promoted  Taken 7/30/2025 0000 by Gail Vance RN  Infection Prevention:   rest/sleep promoted   single patient room provided  Taken 7/29/2025 2230 by Gail Vance RN  Infection Prevention:   personal protective equipment utilized   rest/sleep promoted   single patient room provided  Taken 7/29/2025 2000 by Gail Vance RN  Infection Prevention:   environmental surveillance performed   personal protective equipment utilized   rest/sleep promoted   single patient room provided  Goal: Optimal Comfort and Wellbeing  Outcome: Progressing  Intervention: Provide Person-Centered Care  Recent Flowsheet Documentation  Taken 7/29/2025 2000 by Gail Vance RN  Trust Relationship/Rapport:   care explained   questions encouraged  Goal: Readiness for Transition of Care  Outcome: Progressing     Problem: Fall Injury Risk  Goal: Absence of Fall and Fall-Related Injury  Outcome: Progressing  Intervention: Identify and Manage Contributors  Recent Flowsheet Documentation  Taken 7/29/2025 2230 by Gail Vance RN  Self-Care Promotion: independence encouraged  Taken 7/29/2025 2000 by Gail Vance RN  Medication Review/Management: medications reviewed  Self-Care Promotion: independence encouraged  Intervention: Promote Injury-Free Environment  Recent Flowsheet Documentation  Taken 7/30/2025 0419 by Gail Vance RN  Safety Promotion/Fall Prevention:   safety round/check completed   room organization consistent   nonskid shoes/slippers when out of bed   fall prevention program maintained   clutter free environment maintained  Taken 7/30/2025 0200 by Gail Vance RN  Safety Promotion/Fall Prevention:   safety round/check completed   room organization consistent   nonskid shoes/slippers when out of bed   fall prevention program maintained   clutter free environment maintained  Taken 7/30/2025 0000 by  Gail Vance, RN  Safety Promotion/Fall Prevention:   clutter free environment maintained   fall prevention program maintained   nonskid shoes/slippers when out of bed   room organization consistent   safety round/check completed  Taken 7/29/2025 2230 by Gail Vance RN  Safety Promotion/Fall Prevention:   clutter free environment maintained   fall prevention program maintained   nonskid shoes/slippers when out of bed   room organization consistent   safety round/check completed  Taken 7/29/2025 2000 by Gail Vance RN  Safety Promotion/Fall Prevention:   assistive device/personal items within reach   clutter free environment maintained   fall prevention program maintained   nonskid shoes/slippers when out of bed   safety round/check completed   room organization consistent     Problem: Skin Injury Risk Increased  Goal: Skin Health and Integrity  Outcome: Progressing  Intervention: Optimize Skin Protection  Recent Flowsheet Documentation  Taken 7/30/2025 0419 by Gail Vance, RN  Activity Management: activity minimized  Head of Bed (HOB) Positioning: HOB at 20 degrees  Taken 7/30/2025 0200 by Gail Vance RN  Activity Management: activity minimized  Head of Bed (HOB) Positioning: HOB at 20 degrees  Taken 7/30/2025 0000 by Gail Vance RN  Activity Management: activity minimized  Head of Bed (HOB) Positioning: HOB at 15 degrees  Taken 7/29/2025 2230 by Gail Vance RN  Activity Management: activity minimized  Head of Bed (HOB) Positioning: HOB at 30 degrees  Taken 7/29/2025 2000 by Gail Vance, RN  Activity Management: activity minimized  Head of Bed (HOB) Positioning: HOB at 30 degrees     Problem: Comorbidity Management  Goal: Blood Pressure in Desired Range  Outcome: Progressing  Intervention: Maintain Blood Pressure Management  Recent Flowsheet Documentation  Taken 7/29/2025 2000 by Gail Vance, RN  Medication Review/Management: medications reviewed   Goal  Outcome Evaluation:  Plan of Care Reviewed With: patient        Progress: improving  Outcome Evaluation: No issues this shift, no complaints voiced. Waiting on SNF placement.

## 2025-07-30 NOTE — PROGRESS NOTES
Name: Devin Scales ADMIT: 2025   : 1940  PCP: Mreon Starks DO    MRN: [id number] LOS: 5 days   AGE/SEX: 84 y.o. male  ROOM: [facility]   Subjective   Chief Complaint   Patient presents with    Fall     Head lac, L leg swelling and redness   Multiple falls      On abx  Previously was having urinary retention with previous I/O cath  Partida placed now  On IVFs    ROS  No f/c  No n/v  No cp/palp  No soa/cough    Objective   Vital Signs  Temp:  [97.9 °F (36.6 °C)-98 °F (36.7 °C)] 97.9 °F (36.6 °C)  Heart Rate:  [] 105  Resp:  [16] 16  BP: (123-149)/(60-79) 133/76  SpO2:  [98 %-100 %] 98 %  on   ;   Device (Oxygen Therapy): room air  Body mass index is 19.67 kg/m².    Physical Exam  HENT:      Head: Normocephalic and atraumatic.   Eyes:      General: No scleral icterus.  Cardiovascular:      Rate and Rhythm: Normal rate and regular rhythm.   Pulmonary:      Effort: Pulmonary effort is normal. No respiratory distress.   Abdominal:      General: There is no distension.      Palpations: Abdomen is soft.      Tenderness: There is no abdominal tenderness.   Musculoskeletal:      Cervical back: Neck supple.   Neurological:      Mental Status: He is alert.   Psychiatric:         Behavior: Behavior normal.     Elderly, some poor memory, chronically ill  Cellulitis appears improved  +partida    Results Review:       I reviewed the patient's new clinical results.  Results from last 7 days   Lab Units 25  0535 25  0627 25  1435   WBC 10*3/mm3 8.20 9.19 10.10   HEMOGLOBIN g/dL 8.9* 10.8* 10.5*   PLATELETS 10*3/mm3 183 266 286     Results from last 7 days   Lab Units 25  0627 25  0535 25  0835 25  0611   SODIUM mmol/L 141 140 145 142   POTASSIUM mmol/L 3.5 3.9 4.1 4.2   CHLORIDE mmol/L 109* 109* 109* 106   CO2 mmol/L 23.0 21.2* 23.2 20.0*   BUN mg/dL 23.0 24.0* 29.0* 33.0*   CREATININE mg/dL 1.59* 2.39* 2.86* 3.51*   GLUCOSE mg/dL 77 85 112* 130*   Estimated Creatinine  "Clearance: 29.5 mL/min (A) (by C-G formula based on SCr of 1.59 mg/dL (H)).  Results from last 7 days   Lab Units 07/30/25 0627 07/29/25  0535 07/28/25  0835 07/27/25  0611 07/25/25  1435   ALBUMIN g/dL 2.5* 2.2* 2.8* 3.5 3.4*   BILIRUBIN mg/dL  --   --   --   --  0.5   ALK PHOS U/L  --   --   --   --  64   AST (SGOT) U/L  --   --   --   --  23   ALT (SGPT) U/L  --   --   --   --  19     Results from last 7 days   Lab Units 07/30/25 0627 07/29/25 0535 07/28/25  0835 07/27/25  0611 07/26/25  0627   CALCIUM mg/dL 7.7* 7.9* 8.4* 8.9 8.9   ALBUMIN g/dL 2.5* 2.2* 2.8* 3.5  --    MAGNESIUM mg/dL  --  1.8  --   --  2.1   PHOSPHORUS mg/dL 2.3* 3.3 4.1 3.7 4.8*         Coag   Results from last 7 days   Lab Units 07/26/25  0627   INR  1.21*   APTT seconds 27.5     HbA1C No results found for: \"HGBA1C\"  Infection     Radiology(recent) No radiology results for the last day  No results found for: \"TROPONINT\", \"TROPONINI\", \"BNP\"  No components found for: \"TSH;2\"    arformoterol, 15 mcg, Nebulization, BID - RT  aspirin, 81 mg, Oral, Daily  ceFAZolin, 2,000 mg, Intravenous, Q12H  mupirocin, 1 Application, Each Nare, BID  senna-docusate sodium, 2 tablet, Oral, BID  tamsulosin, 0.4 mg, Oral, Daily  thiamine, 100 mg, Oral, Daily         Diet: Regular/House; Fluid Consistency: Thin (IDDSI 0)      Assessment & Plan      Active Hospital Problems    Diagnosis  POA    **Subarachnoid hematoma [S06.6XAA]  Yes    Moderate protein-calorie malnutrition [E44.0]  Yes    Cellulitis of left lower extremity [L03.116]  Yes    SAH (subarachnoid hemorrhage) [I60.9]  Yes    Acute kidney injury [N17.9]  Yes    Cigarette smoker [F17.210]  Yes    COPD (chronic obstructive pulmonary disease) [J44.9]  Yes    Peripheral vascular disease [I73.9]  Yes      Resolved Hospital Problems   No resolved problems to display.     83 yo who presented with a fall and SAH. Seen by MARTHA who recommended conservative management    STEVENSON- nephrology following. On IVFs. Jose Elias " placed for urinary retention. Renal fx improving. S/p Phos replacement. Will plan on leaving catheter at discharge and then he can follow up with [facility] as outpatient  On cefazolin for cellulitis which appears improving. Can probably finish after todays dose  On bronchodilators for COPD without exacerbation  daily thiamine   Therapy to work on getting stronger  SCDs for dvt proph (not pharmacologic with SAH)    ANA Navarro     Dispo- probably to rehab tomorrow if bed available.       Ga Whitmore MD  [facility]  07/30/25  16:13 EDT

## 2025-07-30 NOTE — PROGRESS NOTES
[facility] Progress Note      Patient Name: Devin Scales  : 1940  MRN: [id number]  Primary Care Physician:  Meron Starks DO  Date of admission: 2025    Subjective     Interval History:   Follow-up acute kidney injury on CKD of unknown baseline.  Catheter in place.  Urine output 2.9 L.  Blood pressure stable.  On room air.  Confused.  Review of Systems:   As noted above    Objective     Vitals:   Temp:  [97.5 °F (36.4 °C)-98 °F (36.7 °C)] 98 °F (36.7 °C)  Heart Rate:  [] 105  Resp:  [16-17] 16  BP: (111-149)/(60-79) 149/79    Intake/Output Summary (Last 24 hours) at 2025 0905  Last data filed at 2025 0600  Gross per 24 hour   Intake 958 ml   Output 2925 ml   Net -1967 ml       Physical Exam:    General: Chronically ill.  Frail.  Confused.  Oriented to self only.  HEENT: No scleral icterus.  Right brow suture  Neck: Supple,  trachea at midline, no JVD  Heart: Regular rate and rhythm.  No S3 or rub.  Lungs: Clear to auscultation bilaterally.  Unlabored on room air.  Abd:BS +, distended, soft, nontender   : Moctezuma catheter in place  Extremities: Trace lower extremity edema.  Neurologic:  Confused.  Skin: Warm and dry; less erythema  left lower leg/foot  .     Scheduled Meds:     arformoterol, 15 mcg, Nebulization, BID - RT  aspirin, 81 mg, Oral, Daily  ceFAZolin, 2,000 mg, Intravenous, Q12H  mupirocin, 1 Application, Each Nare, BID  senna-docusate sodium, 2 tablet, Oral, BID  tamsulosin, 0.4 mg, Oral, Daily  thiamine, 100 mg, Oral, Daily      IV Meds:          Results Reviewed:   I have personally reviewed the results from the time of this admission to 2025 09:05 EDT     Results from last 7 days   Lab Units 25  0627 25  0535 25  0835 25  0627 25  1435   SODIUM mmol/L 141 140 145   < > 141   POTASSIUM mmol/L 3.5 3.9 4.1   < > 5.0   CHLORIDE mmol/L 109* 109* 109*   < > 104   CO2 mmol/L 23.0 21.2* 23.2   < > 26.0   BUN mg/dL  23.0 24.0* 29.0*   < > 34.0*   CREATININE mg/dL 1.59* 2.39* 2.86*   < > 3.05*   CALCIUM mg/dL 7.7* 7.9* 8.4*   < > 9.2   BILIRUBIN mg/dL  --   --   --   --  0.5   ALK PHOS U/L  --   --   --   --  64   ALT (SGPT) U/L  --   --   --   --  19   AST (SGOT) U/L  --   --   --   --  23   GLUCOSE mg/dL 77 85 112*   < > 97    < > = values in this interval not displayed.       Estimated Creatinine Clearance: 29.5 mL/min (A) (by C-G formula based on SCr of 1.59 mg/dL (H)).    Results from last 7 days   Lab Units 07/30/25  0627 07/29/25  0535 07/28/25  0835 07/27/25  0611 07/26/25  0627   MAGNESIUM mg/dL  --  1.8  --   --  2.1   PHOSPHORUS mg/dL 2.3* 3.3 4.1   < > 4.8*    < > = values in this interval not displayed.       Results from last 7 days   Lab Units 07/27/25  0611 07/26/25  2111   URIC ACID mg/dL 6.0 6.3       Results from last 7 days   Lab Units 07/29/25  0535 07/26/25  0627 07/25/25  1435   WBC 10*3/mm3 8.20 9.19 10.10   HEMOGLOBIN g/dL 8.9* 10.8* 10.5*   PLATELETS 10*3/mm3 183 266 286       Results from last 7 days   Lab Units 07/26/25  0627   INR  1.21*       Assessment / Plan     ASSESSMENT:  STEVENSON versus STEVENSON/CKD, nonoliguric, improving.  Initially prerenal progressing to ATN.  Bladder outlet obstruction now status post Moctezuma catheter.  Waste products with significant improvement today.  Phosphorus mildly low..  Dysuria/BPH, retention.  Moctezuma catheter was anchored 7/28/25.  Recent fall with subarachnoid hemorrhage.  Conservative management.  Cellulitis left lower leg.  On Kefzol day 5 of planned of 5  COPD  Malnutrition :diet with supplements.  Anemia with low iron saturation    PLAN:  Replace K-Phos IV.    Thank you for involving us in the care of Devin Scales.  Please feel free to call with any questions.    Lisset Navarro MD  07/30/25  09:05 EDT    [facility]  [phone]    Please note that portions of this note were completed with a voice recognition program.

## 2025-07-31 LAB
ALBUMIN SERPL-MCNC: 2.5 G/DL (ref 3.5–5.2)
ANION GAP SERPL CALCULATED.3IONS-SCNC: 5 MMOL/L (ref 5–15)
BUN SERPL-MCNC: 20 MG/DL (ref 8–23)
BUN/CREAT SERPL: 14.3 (ref 7–25)
CALCIUM SPEC-SCNC: 7.5 MG/DL (ref 8.6–10.5)
CHLORIDE SERPL-SCNC: 107 MMOL/L (ref 98–107)
CO2 SERPL-SCNC: 27 MMOL/L (ref 22–29)
CREAT SERPL-MCNC: 1.4 MG/DL (ref 0.76–1.27)
EGFRCR SERPLBLD CKD-EPI 2021: 50.2 ML/MIN/1.73
GLUCOSE SERPL-MCNC: 99 MG/DL (ref 65–99)
PHOSPHATE SERPL-MCNC: 2.5 MG/DL (ref 2.5–4.5)
POTASSIUM SERPL-SCNC: 4 MMOL/L (ref 3.5–5.2)
SODIUM SERPL-SCNC: 139 MMOL/L (ref 136–145)

## 2025-07-31 PROCEDURE — 94799 UNLISTED PULMONARY SVC/PX: CPT

## 2025-07-31 PROCEDURE — 94664 DEMO&/EVAL PT USE INHALER: CPT

## 2025-07-31 PROCEDURE — 80069 RENAL FUNCTION PANEL: CPT | Performed by: INTERNAL MEDICINE

## 2025-07-31 PROCEDURE — 97530 THERAPEUTIC ACTIVITIES: CPT

## 2025-07-31 RX ADMIN — Medication 100 MG: at 09:20

## 2025-07-31 RX ADMIN — SENNOSIDES AND DOCUSATE SODIUM 2 TABLET: 50; 8.6 TABLET ORAL at 20:30

## 2025-07-31 RX ADMIN — ASPIRIN 81 MG: 81 TABLET, COATED ORAL at 09:20

## 2025-07-31 RX ADMIN — ARFORMOTEROL TARTRATE 15 MCG: 15 SOLUTION RESPIRATORY (INHALATION) at 10:37

## 2025-07-31 RX ADMIN — TAMSULOSIN HYDROCHLORIDE 0.4 MG: 0.4 CAPSULE ORAL at 09:20

## 2025-07-31 RX ADMIN — ARFORMOTEROL TARTRATE 15 MCG: 15 SOLUTION RESPIRATORY (INHALATION) at 21:25

## 2025-07-31 NOTE — PLAN OF CARE
Goal Outcome Evaluation:  Plan of Care Reviewed With: patient           Outcome Evaluation: Pt seen for PT this AM. He is resting in bed upon entry to room. Pt transitioned to EOB w min A. Increased cues for sequencing. He demos posterior in sitting when attempting to don socks. Pt able to stand w min A using rwx. He ambulated approx 15 ft in room. Pt demos slow pace w mild unsteadiness. He requires assist w walker management. Pt w confusion noted throughout session. Likely plans to DC to SNF tomorrow. Will continue to follow.

## 2025-07-31 NOTE — CASE MANAGEMENT/SOCIAL WORK
Continued Stay Note  [facility]     Patient Name: Devin Scales  MRN: [id number]  Today's Date: 7/31/2025    Admit Date: 7/25/2025    Plan: Plan [facility] for skilled care.  SUDHAKAR Ny RN   Discharge Plan       Row Name 07/31/25 0953       Plan    Plan Plan [facility] for skilled care.  SUDHAKAR Ny RN    Plan Comments Per [facility] ( [phone]) pt has a bed and can be accepted at [facility] on 8/1. Called pt's son ( Devin Scales [phone]) and informed him [facility] can accept pt on 8/1. Son to check on transporting pt and inform [facility] if family can transport. Plan [facility] for skilled care. SUDHAKAR Ny RN      Row Name 07/31/25 0139       Plan    Plan Plan skilled care at accepting facility.   SUDHAKAR Ny RN    Patient/Family in Agreement with Plan yes    Plan Comments Per EPIC referrals were sent to [facility] , [facility] and [facility].  Per [facility] has accepted pt.  Called pt's son ( Devin Scales [phone]) to get pt's choices in order.  Pt 's son first choice is  [facility].  Called to [facility] ( [phone]) to check on bed availability and if pt can be accepted.  Plan skilled care at accepting facility.  SUDHAKAR Ny RN                   Discharge Codes    No documentation.                 Expected Discharge Date and Time       Expected Discharge Date Expected Discharge Time    Aug 1, 2025               Diana Ny RN

## 2025-07-31 NOTE — CASE MANAGEMENT/SOCIAL WORK
Continued Stay Note  [facility]     Patient Name: Devin Scales  MRN: [id number]  Today's Date: 7/31/2025    Admit Date: 7/25/2025    Plan: Plan skilled care at accepting facility.   SUDHAKAR Ny RN   Discharge Plan       Row Name 07/31/25 0823       Plan    Plan Plan skilled care at accepting facility.   SUDHAKAR yN RN    Patient/Family in Agreement with Plan yes    Plan Comments Per EPIC referrals were sent to [facility] , [facility] and [facility].  Per [facility] has accepted pt.  Called pt's son ( Devin Scales [phone]) to get pt's choices in order.  Pt 's son first choice is  [facility].  Called to [facility] ( [phone]) to check on bed availability and if pt can be accepted.  Plan skilled care at accepting facility.  SUDHAKAR Ny RN                   Discharge Codes    No documentation.                 Expected Discharge Date and Time       Expected Discharge Date Expected Discharge Time    Aug 1, 2025               Diana Ny RN

## 2025-07-31 NOTE — THERAPY TREATMENT NOTE
Patient Name: Devin Scales  : 1940    MRN: [id number]                              Today's Date: 2025       Admit Date: 2025    Visit Dx:     ICD-10-CM ICD-9-CM   1. SAH (subarachnoid hemorrhage)  I60.9 430   2. STEVENSON (acute kidney injury)  N17.9 584.9   3. Laceration of forehead, initial encounter  S01.81XA 873.42     Patient Active Problem List   Diagnosis    Subarachnoid hemorrhage following injury, no loss of consciousness, initial encounter    COPD (chronic obstructive pulmonary disease)    Cigarette smoker    Alcohol dependence    Peripheral vascular disease    CSF leak    Acute kidney injury    Subarachnoid hematoma    SAH (subarachnoid hemorrhage)    Cellulitis of left lower extremity    Moderate protein-calorie malnutrition     Past Medical History:   Diagnosis Date    Benign essential tremor     BPH (benign prostatic hyperplasia)     Chest pain     Dyspnea     Fatigue     Hyperlipidemia      Past Surgical History:   Procedure Laterality Date    CARDIAC CATHETERIZATION N/A 2018    Procedure: Left Heart Cath;  Surgeon: Emelia Arellano MD;  Location: [facility] CATH INVASIVE LOCATION;  Service:     CARDIAC CATHETERIZATION N/A 2018    Procedure: Coronary angiography;  Surgeon: Emelia Arellano MD;  Location: [facility] CATH INVASIVE LOCATION;  Service:     CARDIAC CATHETERIZATION N/A 2018    Procedure: Left ventriculography;  Surgeon: Emelia Arellano MD;  Location: [facility] CATH INVASIVE LOCATION;  Service:       General Information       Row Name 25 1136          Physical Therapy Time and Intention    Document Type therapy note (daily note)  -EJ     Mode of Treatment co-treatment;occupational therapy;physical therapy;other (see comments)  -EJ       Row Name 25 1136          General Information    Existing Precautions/Restrictions fall  -EJ     Barriers to Rehab cognitive status  -EJ       Row Name 25 1136          Safety Issues/Impairments Affecting Functional Mobility     Impairments Affecting Function (Mobility) balance;cognition;endurance/activity tolerance;strength  -EJ     Comment, Safety Issues/Impairments (Mobility) Co treatment medically appropriate and necessary due to patient acuity level, activity tolerance and safety of patient and staff. Treatment is focusing on progression of care and goals established in the POC.  -EJ               User Key  (r) = Recorded By, (t) = Taken By, (c) = Cosigned By      Initials Name Provider Type    Le Guillen, PT Physical Therapist                   Mobility       Row Name 07/31/25 1136          Bed Mobility    Supine-Sit Josephine (Bed Mobility) minimum assist (75% patient effort);verbal cues;nonverbal cues (demo/gesture)  -EJ     Assistive Device (Bed Mobility) bed rails;head of bed elevated  -EJ     Comment, (Bed Mobility) increased cues for sequencnig to transition to EOB,  some support required for sitting balance with pt attempting to don socks  -EJ       Row Name 07/31/25 1136          Sit-Stand Transfer    Sit-Stand Josephine (Transfers) set up;verbal cues;nonverbal cues (demo/gesture);minimum assist (75% patient effort)  -EJ     Assistive Device (Sit-Stand Transfers) walker, front-wheeled  -EJ     Comment, (Sit-Stand Transfer) performed x 2  -EJ       Row Name 07/31/25 1136          Gait/Stairs (Locomotion)    Josephine Level (Gait) verbal cues;minimum assist (75% patient effort)  -EJ     Assistive Device (Gait) walker, front-wheeled  -EJ     Deviations/Abnormal Patterns (Gait) alirio decreased;stride length decreased;gait speed decreased  -EJ     Bilateral Gait Deviations forward flexed posture;heel strike decreased  -EJ     Comment, (Gait/Stairs) assist w walker management  -EJ               User Key  (r) = Recorded By, (t) = Taken By, (c) = Cosigned By      Initials Name Provider Type    Le Guillen, PT Physical Therapist                   Obj/Interventions    No documentation.                   Goals/Plan    No documentation.                  Clinical Impression       Row Name 07/31/25 1139          Pain    Pretreatment Pain Rating 0/10 - no pain  -EJ     Posttreatment Pain Rating 0/10 - no pain  -EJ       Row Name 07/31/25 1139          Plan of Care Review    Plan of Care Reviewed With patient  -EJ     Outcome Evaluation Pt seen for PT this AM. He is resting in bed upon entry to room. Pt transitioned to EOB w min A. Increased cues for sequencing. He demos posterior in sitting when attempting to don socks. Pt able to stand w min A using rwx. He ambulated approx 15 ft in room. Pt demos slow pace w mild unsteadiness. He requires assist w walker management. Pt w confusion noted throughout session. Likely plans to DC to SNF tomorrow. Will continue to follow.  -EJ       Row Name 07/31/25 1139          Positioning and Restraints    Pre-Treatment Position in bed  -EJ     Post Treatment Position chair  -EJ     In Chair notified nsg;reclined;call light within reach;encouraged to call for assist;exit alarm on  -EJ               User Key  (r) = Recorded By, (t) = Taken By, (c) = Cosigned By      Initials Name Provider Type    EJ Le Schumacher, PT Physical Therapist                   Outcome Measures       Row Name 07/31/25 1141          How much help from another person do you currently need...    Turning from your back to your side while in flat bed without using bedrails? 3  -EJ     Moving from lying on back to sitting on the side of a flat bed without bedrails? 3  -EJ     Moving to and from a bed to a chair (including a wheelchair)? 3  -EJ     Standing up from a chair using your arms (e.g., wheelchair, bedside chair)? 3  -EJ     Climbing 3-5 steps with a railing? 2  -EJ     To walk in hospital room? 3  -EJ     AM-PAC 6 Clicks Score (PT) 17  -EJ     Highest Level of Mobility Goal Stand (1 or More Minutes)-5  -EJ       Row Name 07/31/25 1048          Functional Assessment    Outcome Measure Options AM-PAC 6  Clicks Daily Activity (OT)  -LIOR               User Key  (r) = Recorded By, (t) = Taken By, (c) = Cosigned By      Initials Name Provider Type    Opal Stiles, OTR/L, CSRS Occupational Therapist    EJ Le Schumacher, PT Physical Therapist                                 Physical Therapy Education       Title: PT OT SLP Therapies (In Progress)       Topic: Physical Therapy (In Progress)       Point: Mobility training (In Progress)       Learning Progress Summary            Patient Acceptance, TB,E,D, NL,NR by  at 7/27/2025 1606                      Point: Home exercise program (In Progress)       Learning Progress Summary            Patient Acceptance, TB,E,D, NL,NR by  at 7/27/2025 1606                      Point: Body mechanics (In Progress)       Learning Progress Summary            Patient Acceptance, TB,E,D, NL,NR by  at 7/27/2025 1606                      Point: Precautions (In Progress)       Learning Progress Summary            Patient Acceptance, TB,E,D, NL,NR by  at 7/27/2025 1606                                      User Key       Initials Effective Dates Name Provider Type Discipline     04/08/22 -  Ailyn Masters, PT Physical Therapist PT                  PT Recommendation and Plan     Outcome Evaluation: Pt seen for PT this AM. He is resting in bed upon entry to room. Pt transitioned to EOB w min A. Increased cues for sequencing. He demos posterior in sitting when attempting to don socks. Pt able to stand w min A using rwx. He ambulated approx 15 ft in room. Pt demos slow pace w mild unsteadiness. He requires assist w walker management. Pt w confusion noted throughout session. Likely plans to DC to SNF tomorrow. Will continue to follow.     Time Calculation:         PT Charges       Row Name 07/31/25 1141             Time Calculation    Start Time 1008  -EJ      Stop Time 1028  -EJ      Time Calculation (min) 20 min  -EJ      PT Received On 07/31/25  -EJ      PT - Next Appointment  08/01/25  -STEVEN                User Key  (r) = Recorded By, (t) = Taken By, (c) = Cosigned By      Initials Name Provider Type    Le Guillen, PT Physical Therapist                  Therapy Charges for Today       Code Description Service Date Service Provider Modifiers Qty    [id number]  PT THERAPEUTIC ACT EA 15 MIN 7/31/2025 Le Schumacher, PT GP 1            PT G-Codes  Outcome Measure Options: AM-PAC 6 Clicks Daily Activity (OT)  AM-PAC 6 Clicks Score (PT): 17  AM-PAC 6 Clicks Score (OT): 15  Modified [place] Scale: 4 - Moderately severe disability.  Unable to walk without assistance, and unable to attend to own bodily needs without assistance.       Le Schumacher, PT  7/31/2025

## 2025-07-31 NOTE — PLAN OF CARE
Goal Outcome Evaluation:  Plan of Care Reviewed With: patient           Outcome Evaluation: Pt presents in bed and is with cueing and assist moves to EOB, demo posterior LOB when trying to don socks.  Cueing for sequencing for xfers and walking to chair.   MD see pt during session and states plan to d/c to SNU tomorrow.  Recommend cont skilled OT at d/c setting.    Anticipated Discharge Disposition (OT): skilled nursing facility

## 2025-07-31 NOTE — PLAN OF CARE
Goal Outcome Evaluation:  Plan of Care Reviewed With: patient        Progress: improving  Outcome Evaluation: No issues this shift, no complaints voiced. Waiting on SNF placement.                           Problem: Violence Risk or Actual  Goal: Anger and Impulse Control  Outcome: Progressing  Intervention: Minimize Safety Risk  Recent Flowsheet Documentation  Taken 7/31/2025 0608 by Gail Vance RN  Enhanced Safety Measures: bed alarm set  Taken 7/31/2025 0209 by Gail Vance RN  Enhanced Safety Measures: bed alarm set  Taken 7/31/2025 0030 by Gail Vance RN  Enhanced Safety Measures: bed alarm set  Taken 7/30/2025 2219 by Gail Vance RN  Enhanced Safety Measures: bed alarm set  Taken 7/30/2025 2023 by Gail Vance RN  Sensory Stimulation Regulation:   care clustered   quiet environment promoted   television on  Enhanced Safety Measures:   bed alarm set   room near unit station     Problem: Adult Inpatient Plan of Care  Goal: Plan of Care Review  Outcome: Progressing  Flowsheets  Taken 7/31/2025 0610  Progress: improving  Plan of Care Reviewed With: patient  Taken 7/30/2025 0450  Outcome Evaluation: No issues this shift, no complaints voiced. Waiting on SNF placement.  Goal: Patient-Specific Goal (Individualized)  Outcome: Progressing  Goal: Absence of Hospital-Acquired Illness or Injury  Outcome: Progressing  Intervention: Identify and Manage Fall Risk  Recent Flowsheet Documentation  Taken 7/31/2025 0608 by Gail Vance RN  Safety Promotion/Fall Prevention:   fall prevention program maintained   nonskid shoes/slippers when out of bed   room organization consistent   safety round/check completed   clutter free environment maintained  Taken 7/31/2025 0209 by Gail Vance RN  Safety Promotion/Fall Prevention:   clutter free environment maintained   fall prevention program maintained   nonskid shoes/slippers when out of bed   room organization consistent   safety  round/check completed  Taken 7/31/2025 0030 by Gail Vance RN  Safety Promotion/Fall Prevention:   clutter free environment maintained   fall prevention program maintained   nonskid shoes/slippers when out of bed   room organization consistent   safety round/check completed  Taken 7/30/2025 2219 by Gail Vance RN  Safety Promotion/Fall Prevention:   clutter free environment maintained   fall prevention program maintained   nonskid shoes/slippers when out of bed   room organization consistent   safety round/check completed  Taken 7/30/2025 2023 by Gail Vance RN  Safety Promotion/Fall Prevention:   clutter free environment maintained   fall prevention program maintained   muscle strengthening facilitated   room organization consistent   safety round/check completed   activity supervised  Intervention: Prevent Skin Injury  Recent Flowsheet Documentation  Taken 7/31/2025 0608 by Gail Vance RN  Body Position:   sitting up in bed   foot of bed elevated  Taken 7/31/2025 0209 by Gail Vance RN  Body Position: weight shifting  Taken 7/31/2025 0030 by Gail Vance RN  Body Position:   position changed independently   tilted   left   foot of bed elevated  Taken 7/30/2025 2219 by Gail Vance RN  Body Position:   position changed independently   tilted  Taken 7/30/2025 2023 by Gail Vance RN  Body Position: sitting up in bed  Skin Protection:   transparent dressing maintained   silicone foam dressing in place  Intervention: Prevent Infection  Recent Flowsheet Documentation  Taken 7/31/2025 0608 by Gail Vance RN  Infection Prevention:   rest/sleep promoted   single patient room provided  Taken 7/31/2025 0209 by Gail Vance RN  Infection Prevention: rest/sleep promoted  Taken 7/31/2025 0030 by Gail Vance RN  Infection Prevention:   single patient room provided   rest/sleep promoted  Taken 7/30/2025 2219 by Gail Vance RN  Infection Prevention:    environmental surveillance performed   rest/sleep promoted   single patient room provided  Taken 7/30/2025 2023 by Gail Vance RN  Infection Prevention:   environmental surveillance performed   rest/sleep promoted  Goal: Optimal Comfort and Wellbeing  Outcome: Progressing  Intervention: Provide Person-Centered Care  Recent Flowsheet Documentation  Taken 7/30/2025 2023 by Gail Vance RN  Trust Relationship/Rapport: care explained  Goal: Readiness for Transition of Care  Outcome: Progressing     Problem: Fall Injury Risk  Goal: Absence of Fall and Fall-Related Injury  Outcome: Progressing  Intervention: Identify and Manage Contributors  Recent Flowsheet Documentation  Taken 7/30/2025 2100 by Gail Vance RN  Self-Care Promotion: independence encouraged  Taken 7/30/2025 2023 by Gial Vance RN  Medication Review/Management: medications reviewed  Intervention: Promote Injury-Free Environment  Recent Flowsheet Documentation  Taken 7/31/2025 0608 by Gail Vance RN  Safety Promotion/Fall Prevention:   fall prevention program maintained   nonskid shoes/slippers when out of bed   room organization consistent   safety round/check completed   clutter free environment maintained  Taken 7/31/2025 0209 by Gail Vance RN  Safety Promotion/Fall Prevention:   clutter free environment maintained   fall prevention program maintained   nonskid shoes/slippers when out of bed   room organization consistent   safety round/check completed  Taken 7/31/2025 0030 by Gail Vance RN  Safety Promotion/Fall Prevention:   clutter free environment maintained   fall prevention program maintained   nonskid shoes/slippers when out of bed   room organization consistent   safety round/check completed  Taken 7/30/2025 2219 by Gail Vance RN  Safety Promotion/Fall Prevention:   clutter free environment maintained   fall prevention program maintained   nonskid shoes/slippers when out of bed   room  organization consistent   safety round/check completed  Taken 7/30/2025 2023 by Gail Vance RN  Safety Promotion/Fall Prevention:   clutter free environment maintained   fall prevention program maintained   muscle strengthening facilitated   room organization consistent   safety round/check completed   activity supervised     Problem: Skin Injury Risk Increased  Goal: Skin Health and Integrity  Outcome: Progressing  Intervention: Optimize Skin Protection  Recent Flowsheet Documentation  Taken 7/31/2025 0608 by Gail Vance RN  Activity Management: activity minimized  Head of Bed (HOB) Positioning: HOB at 30 degrees  Taken 7/31/2025 0209 by Gail Vance RN  Activity Management: activity minimized  Head of Bed (HOB) Positioning: HOB at 20 degrees  Taken 7/31/2025 0030 by Gail Vance RN  Activity Management: activity minimized  Head of Bed (HOB) Positioning: HOB at 20-30 degrees  Taken 7/30/2025 2219 by Gail Vance RN  Activity Management: activity minimized  Head of Bed (HOB) Positioning: HOB at 30 degrees  Taken 7/30/2025 2023 by Gail Vance RN  Activity Management: activity minimized  Pressure Reduction Techniques: frequent weight shift encouraged  Head of Bed (HOB) Positioning: HOB at 30-45 degrees  Pressure Reduction Devices:   positioning supports utilized   alternating pressure pump (JAN)  Skin Protection:   transparent dressing maintained   silicone foam dressing in place     Problem: Comorbidity Management  Goal: Blood Pressure in Desired Range  Outcome: Progressing  Intervention: Maintain Blood Pressure Management  Recent Flowsheet Documentation  Taken 7/30/2025 2023 by Gail Vance RN  Medication Review/Management: medications reviewed

## 2025-07-31 NOTE — PROGRESS NOTES
Name: Devin Scales ADMIT: 2025   : 1940  PCP: Meron Starks DO    MRN: [id number] LOS: 6 days   AGE/SEX: 84 y.o. male  ROOM: [facility]   Subjective   Chief Complaint   Patient presents with    Fall     Head lac, L leg swelling and redness   Multiple falls        Previously was having urinary retention with previous I/O cath  Partida placed now  S/p IVFs  Finished course of abx, cellulitis improved  Working with therapy    ROS  No f/c  No n/v  No cp/palp  No soa/cough    Objective   Vital Signs  Temp:  [97.7 °F (36.5 °C)-98.4 °F (36.9 °C)] 98.1 °F (36.7 °C)  Heart Rate:  [80-94] 91  Resp:  [16-18] 18  BP: (147-153)/(69-78) 150/74  SpO2:  [97 %-99 %] 97 %  on   ;   Device (Oxygen Therapy): room air  Body mass index is 18.76 kg/m².    Physical Exam  HENT:      Head: Normocephalic and atraumatic.   Eyes:      General: No scleral icterus.  Pulmonary:      Effort: Pulmonary effort is normal. No respiratory distress.   Musculoskeletal:      Cervical back: Neck supple.   Neurological:      Mental Status: He is alert.     Elderly, some poor memory, chronically ill  Cellulitis improved  +partida  Up working with therapy    Results Review:       I reviewed the patient's new clinical results.  Results from last 7 days   Lab Units 25  0535 25  0627 25  1435   WBC 10*3/mm3 8.20 9.19 10.10   HEMOGLOBIN g/dL 8.9* 10.8* 10.5*   PLATELETS 10*3/mm3 183 266 286     Results from last 7 days   Lab Units 25  0736 25  0627 25  0535 25  0835   SODIUM mmol/L 139 141 140 145   POTASSIUM mmol/L 4.0 3.5 3.9 4.1   CHLORIDE mmol/L 107 109* 109* 109*   CO2 mmol/L 27.0 23.0 21.2* 23.2   BUN mg/dL 20.0 23.0 24.0* 29.0*   CREATININE mg/dL 1.40* 1.59* 2.39* 2.86*   GLUCOSE mg/dL 99 77 85 112*   Estimated Creatinine Clearance: 31.9 mL/min (A) (by C-G formula based on SCr of 1.4 mg/dL (H)).  Results from last 7 days   Lab Units 25  0736 25  0627 25  0535 25  0835  "07/27/25  0611 07/25/25  1435   ALBUMIN g/dL 2.5* 2.5* 2.2* 2.8*   < > 3.4*   BILIRUBIN mg/dL  --   --   --   --   --  0.5   ALK PHOS U/L  --   --   --   --   --  64   AST (SGOT) U/L  --   --   --   --   --  23   ALT (SGPT) U/L  --   --   --   --   --  19    < > = values in this interval not displayed.     Results from last 7 days   Lab Units 07/31/25  0736 07/30/25  0627 07/29/25  0535 07/28/25  0835 07/27/25  0611 07/26/25  0627   CALCIUM mg/dL 7.5* 7.7* 7.9* 8.4*   < > 8.9   ALBUMIN g/dL 2.5* 2.5* 2.2* 2.8*   < >  --    MAGNESIUM mg/dL  --   --  1.8  --   --  2.1   PHOSPHORUS mg/dL 2.5 2.3* 3.3 4.1   < > 4.8*    < > = values in this interval not displayed.         Coag   Results from last 7 days   Lab Units 07/26/25  0627   INR  1.21*   APTT seconds 27.5     HbA1C No results found for: \"HGBA1C\"  Infection     Radiology(recent) No radiology results for the last day  No results found for: \"TROPONINT\", \"TROPONINI\", \"BNP\"  No components found for: \"TSH;2\"    arformoterol, 15 mcg, Nebulization, BID - RT  aspirin, 81 mg, Oral, Daily  senna-docusate sodium, 2 tablet, Oral, BID  tamsulosin, 0.4 mg, Oral, Daily  thiamine, 100 mg, Oral, Daily         Diet: Regular/House; Fluid Consistency: Thin (IDDSI 0)      Assessment & Plan      Active Hospital Problems    Diagnosis  POA    **Subarachnoid hematoma [S06.6XAA]  Yes    Moderate protein-calorie malnutrition [E44.0]  Yes    Cellulitis of left lower extremity [L03.116]  Yes    SAH (subarachnoid hemorrhage) [I60.9]  Yes    Acute kidney injury [N17.9]  Yes    Cigarette smoker [F17.210]  Yes    COPD (chronic obstructive pulmonary disease) [J44.9]  Yes    Peripheral vascular disease [I73.9]  Yes      Resolved Hospital Problems   No resolved problems to display.     83 yo who presented with a fall and SAH. Seen by MARTHA who recommended conservative management    STEVENSON- nephrology following. S/p IVFs. Moctezuma placed for urinary retention. Renal fx improving. S/p Phos replacement. Will " plan on leaving catheter at discharge and then he can follow up with [facility] as outpatient  S/p cefazolin for cellulitis which appears resolved.  On bronchodilators for COPD without exacerbation  daily thiamine   Therapy to work on getting stronger  SCDs for dvt proph (not pharmacologic with SAH)    DW PT  DW case management     Dispo- probably to rehab tomorrow       Ga Whitmore MD  [facility]  07/31/25  16:13 EDT

## 2025-07-31 NOTE — THERAPY TREATMENT NOTE
Patient Name: Devin Scales  : 1940    MRN: [id number]                              Today's Date: 2025       Admit Date: 2025    Visit Dx:     ICD-10-CM ICD-9-CM   1. SAH (subarachnoid hemorrhage)  I60.9 430   2. STEVENSON (acute kidney injury)  N17.9 584.9   3. Laceration of forehead, initial encounter  S01.81XA 873.42     Patient Active Problem List   Diagnosis    Subarachnoid hemorrhage following injury, no loss of consciousness, initial encounter    COPD (chronic obstructive pulmonary disease)    Cigarette smoker    Alcohol dependence    Peripheral vascular disease    CSF leak    Acute kidney injury    Subarachnoid hematoma    SAH (subarachnoid hemorrhage)    Cellulitis of left lower extremity    Moderate protein-calorie malnutrition     Past Medical History:   Diagnosis Date    Benign essential tremor     BPH (benign prostatic hyperplasia)     Chest pain     Dyspnea     Fatigue     Hyperlipidemia      Past Surgical History:   Procedure Laterality Date    CARDIAC CATHETERIZATION N/A 2018    Procedure: Left Heart Cath;  Surgeon: Emelia Arellano MD;  Location: [facility] CATH INVASIVE LOCATION;  Service:     CARDIAC CATHETERIZATION N/A 2018    Procedure: Coronary angiography;  Surgeon: Emelia Arellano MD;  Location: [facility] CATH INVASIVE LOCATION;  Service:     CARDIAC CATHETERIZATION N/A 2018    Procedure: Left ventriculography;  Surgeon: Emelia Arellano MD;  [facility];  Service:       General Information       Row Name 25 1038          OT Time and Intention    Subjective Information no complaints  -LE     Document Type therapy note (daily note)  -LE     Mode of Treatment co-treatment;physical therapy;occupational therapy  -LE     Patient Effort adequate  -LE     Symptoms Noted During/After Treatment fatigue  -LE       Row Name 25 1038          General Information    Existing Precautions/Restrictions fall  -LE     Barriers to Rehab cognitive status   -       Row Name 07/31/25 1038          Cognition    Orientation Status (Cognition) oriented to;person  slow processing, responses and sequencing today  -       Row Name 07/31/25 1038          Safety Issues/Impairments Affecting Functional Mobility    Impairments Affecting Function (Mobility) balance;cognition;endurance/activity tolerance  -     Cognitive Impairments, Mobility Safety/Performance judgment;problem-solving/reasoning;insight into deficits/self-awareness;awareness, need for assistance;sequencing abilities;attention  -LE     Comment, Safety Issues/Impairments (Mobility) co treat utlized due to pt  cogntion/impaired activity tolerance with goal to maximize mobility efforts and endurance.  -               User Key  (r) = Recorded By, (t) = Taken By, (c) = Cosigned By      Initials Name Provider Type    pOal Stiles, OTR/L, CSRS Occupational Therapist                     Mobility/ADL's       Row Name 07/31/25 1040          Bed Mobility    Bed Mobility supine-sit;sit-supine;scooting/bridging  -LE     Supine-Sit [place] (Bed Mobility) set up;minimum assist (75% patient effort);verbal cues;nonverbal cues (demo/gesture)  -LIOR     Assistive Device (Bed Mobility) bed rails;head of bed elevated  -LIOR     Comment, (Bed Mobility) effort and support to move to EOB.  when sit EOB pt leans back to ct socks with mod support by OT to correct and bring back to sitting.  -       Row Name 07/31/25 1040          Transfers    Transfers sit-stand transfer;stand-sit transfer;bed-chair transfer;toilet transfer  -       Row Name 07/31/25 1040          Bed-Chair Transfer    Bed-Chair [place] (Transfers) set up;nonverbal cues (demo/gesture);verbal cues;minimum assist (75% patient effort)  -LIOR     Assistive Device (Bed-Chair Transfers) walker, front-wheeled  -LIOR     Comment, (Bed-Chair Transfer) guide assist and set up.  -       Row Name 07/31/25 1040          Sit-Stand Transfer    Sit-Stand [place]  (Transfers) set up;verbal cues;nonverbal cues (demo/gesture);minimum assist (75% patient effort)  -LE     Assistive Device (Sit-Stand Transfers) walker, front-wheeled  -LE       Row Name 07/31/25 1040          Stand-Sit Transfer    Stand-Sit [place] (Transfers) set up;verbal cues;nonverbal cues (demo/gesture);minimum assist (75% patient effort)  -LE     Assistive Device (Stand-Sit Transfers) walker, front-wheeled  -LE       Row Name 07/31/25 1040          Functional Mobility    Functional Mobility- Ind. Level set up required;nonverbal cues required (demo/gesture);verbal cues required;minimum assist (75% patient effort)  -LE     Functional Mobility- Device walker, front-wheeled  -LE     Functional Mobility- Comment walks around bed to chair.  had planned to walk to sink to groom first but pt fatigued after talking with MD sitting EOB.  -       Row Name 07/31/25 1040          Activities of Daily Living    BADL Assessment/Intervention toileting;feeding;grooming;lower body dressing;upper body dressing;bathing  -       Row Name 07/31/25 1040          Lower Body Dressing Assessment/Training    [place] Level (Lower Body Dressing) don;doff;socks;maximum assist (25% patient effort);set up;verbal cues  -LE     Position (Lower Body Dressing) edge of bed sitting  -LIOR     Comment, (Lower Body Dressing) ct doff R sock and ct L sock.  pt falls back when trying to reach feet so OT assist with task.  -       Row Name 07/31/25 1040          Toileting Assessment/Training    [place] Level (Toileting) dependent (less than 25% patient effort)  -LIOR     Comment, (Toileting) catheter.  -LIOR               User Key  (r) = Recorded By, (t) = Taken By, (c) = Cosigned By      Initials Name Provider Type    Opal Stiles, OTR/L, CSRS Occupational Therapist                   Obj/Interventions       Row Name 07/31/25 1043          Balance    Balance Assessment sitting static balance;standing static balance;standing dynamic  balance  -LE     Static Sitting Balance standby assist  -LE     Dynamic Sitting Balance moderate assist  -LE     Static Standing Balance minimal assist  -LE     Dynamic Standing Balance minimal assist  -LE     Position/Device Used, Standing Balance walker, front-wheeled  -LE               User Key  (r) = Recorded By, (t) = Taken By, (c) = Cosigned By      Initials Name Provider Type    Opal Stiles, OTR/L, CSRS Occupational Therapist                   Goals/Plan    No documentation.                  Clinical Impression       Row Name 07/31/25 1043          Pain Assessment    Pretreatment Pain Rating 0/10 - no pain  -LE     Posttreatment Pain Rating 0/10 - no pain  -LE       Row Name 07/31/25 1043          Plan of Care Review    Plan of Care Reviewed With patient  -LE     Outcome Evaluation Pt presents in bed and is with cueing and assist moves to EOB, demo posterior LOB when trying to don socks.  Cueing for sequencing for xfers and walking to chair.   MD see pt during session and states plan to d/c to SNU tomorrow.  Recommend cont skilled OT at d/c setting.  -       Row Name 07/31/25 1043          Therapy Plan Review/Discharge Plan (OT)    Anticipated Discharge Disposition (OT) skilled nursing facility  -       Row Name 07/31/25 1043          Vital Signs    O2 Delivery Pre Treatment room air  -LE     O2 Delivery Intra Treatment room air  -LE     O2 Delivery Post Treatment room air  -LE     Pre Patient Position Supine  -LE     Intra Patient Position Standing  -LE     Post Patient Position Sitting  -LE       Row Name 07/31/25 1043          Positioning and Restraints    Pre-Treatment Position in bed  -LE     Post Treatment Position chair  -LE     In Chair notified nsg;reclined;call light within reach;encouraged to call for assist;exit alarm on  blankets around shoulders and at lap as pt reports feeling cold.  -LE               User Key  (r) = Recorded By, (t) = Taken By, (c) = Cosigned By      Initials Name  Provider Type    Opal Stiles OTR/L, KARLI Occupational Therapist                   Outcome Measures       Row Name 07/31/25 1048          How much help from another is currently needed...    Putting on and taking off regular lower body clothing? 2  -LE     Bathing (including washing, rinsing, and drying) 2  -LE     Toileting (which includes using toilet bed pan or urinal) 2  -LE     Putting on and taking off regular upper body clothing 3  -LE     Taking care of personal grooming (such as brushing teeth) 3  -LE     Eating meals 3  -LE     AM-PAC 6 Clicks Score (OT) 15  -LE       Row Name 07/31/25 1048          Functional Assessment    Outcome Measure Options AM-PAC 6 Clicks Daily Activity (OT)  -LE               User Key  (r) = Recorded By, (t) = Taken By, (c) = Cosigned By      Initials Name Provider Type    Opal Stiles OTR/L, KARLI Occupational Therapist                    Occupational Therapy Education       Title: PT OT SLP Therapies (In Progress)       Topic: Occupational Therapy (In Progress)       Point: ADL training (Done)       Learning Progress Summary            Patient Acceptance, E, Bed IU,NR by LIOR at 7/28/2025 1241    Comment: role of OT, plan of care, fall risk. cognition limits retention                      Point: Precautions (Done)       Learning Progress Summary            Patient Acceptance, E, Bed IU,NR by  at 7/28/2025 1241    Comment: role of OT, plan of care, fall risk. cognition limits retention                      Point: Body mechanics (Done)       Learning Progress Summary            Patient Acceptance, E, Bed IU,NR by  at 7/28/2025 1241    Comment: role of OT, plan of care, fall risk. cognition limits retention                                      User Key       Initials Effective Dates Name Provider Type Discipline    LIOR 04/25/25 -  Opal Self OTR/L, KARLI Occupational Therapist OT                  OT Recommendation and Plan  Planned Therapy Interventions (OT): activity  tolerance training, adaptive equipment training, BADL retraining, functional balance retraining, patient/caregiver education/training, strengthening exercise, transfer/mobility retraining  Therapy Frequency (OT): 3 times/wk  Plan of Care Review  Plan of Care Reviewed With: patient  Outcome Evaluation: Pt presents in bed and is with cueing and assist moves to EOB, demo posterior LOB when trying to don socks.  Cueing for sequencing for xfers and walking to chair.   MD see pt during session and states plan to d/c to SNU tomorrow.  Recommend cont skilled OT at d/c setting.     Time Calculation:   Evaluation Complexity (OT)  Review Occupational Profile/Medical/Therapy History Complexity: expanded/moderate complexity  Assessment, Occupational Performance/Identification of Deficit Complexity: 3-5 performance deficits  Clinical Decision Making Complexity (OT): detailed assessment/moderate complexity  Overall Complexity of Evaluation (OT): moderate complexity     Time Calculation- OT       Row Name 07/31/25 1049             Time Calculation- OT    OT Start Time 1012  -LE      OT Stop Time 1026  -LE      OT Time Calculation (min) 14 min  -LE      Total Timed Code Minutes- OT 14 minute(s)  -LE      OT Received On 07/31/25  -LE      OT - Next Appointment 08/04/25  -LE         Timed Charges    81753 - OT Therapeutic Activity Minutes 14  -LE         Total Minutes    Timed Charges Total Minutes 14  -LE       Total Minutes 14  -LE                User Key  (r) = Recorded By, (t) = Taken By, (c) = Cosigned By      Initials Name Provider Type    Opal Stiles OTR/L, CSRS Occupational Therapist                  Therapy Charges for Today       Code Description Service Date Service Provider Modifiers Qty    [id number]  OT THERAPEUTIC ACT EA 15 MIN 7/31/2025 Opal Self OTR/L, CSRS GO 1                 Opal Elder, OTR/L, CSRS  7/31/2025

## 2025-08-01 VITALS
BODY MASS INDEX: 19.78 KG/M2 | OXYGEN SATURATION: 100 % | RESPIRATION RATE: 16 BRPM | HEART RATE: 86 BPM | DIASTOLIC BLOOD PRESSURE: 84 MMHG | HEIGHT: 68 IN | WEIGHT: 130.51 LBS | TEMPERATURE: 97.5 F | SYSTOLIC BLOOD PRESSURE: 151 MMHG

## 2025-08-01 LAB
ALBUMIN SERPL-MCNC: 2.7 G/DL (ref 3.5–5.2)
ANION GAP SERPL CALCULATED.3IONS-SCNC: 8.9 MMOL/L (ref 5–15)
BASOPHILS # BLD AUTO: 0.05 10*3/MM3 (ref 0–0.2)
BASOPHILS NFR BLD AUTO: 0.6 % (ref 0–1.5)
BUN SERPL-MCNC: 22 MG/DL (ref 8–23)
BUN/CREAT SERPL: 18.2 (ref 7–25)
CALCIUM SPEC-SCNC: 7.8 MG/DL (ref 8.6–10.5)
CHLORIDE SERPL-SCNC: 106 MMOL/L (ref 98–107)
CO2 SERPL-SCNC: 24.1 MMOL/L (ref 22–29)
CREAT SERPL-MCNC: 1.21 MG/DL (ref 0.76–1.27)
DEPRECATED RDW RBC AUTO: 43.7 FL (ref 37–54)
EGFRCR SERPLBLD CKD-EPI 2021: 59.8 ML/MIN/1.73
EOSINOPHIL # BLD AUTO: 0.35 10*3/MM3 (ref 0–0.4)
EOSINOPHIL NFR BLD AUTO: 4.5 % (ref 0.3–6.2)
ERYTHROCYTE [DISTWIDTH] IN BLOOD BY AUTOMATED COUNT: 12.1 % (ref 12.3–15.4)
GLUCOSE SERPL-MCNC: 84 MG/DL (ref 65–99)
HCT VFR BLD AUTO: 29.2 % (ref 37.5–51)
HGB BLD-MCNC: 9.6 G/DL (ref 13–17.7)
IMM GRANULOCYTES # BLD AUTO: 0.04 10*3/MM3 (ref 0–0.05)
IMM GRANULOCYTES NFR BLD AUTO: 0.5 % (ref 0–0.5)
LYMPHOCYTES # BLD AUTO: 1.23 10*3/MM3 (ref 0.7–3.1)
LYMPHOCYTES NFR BLD AUTO: 15.7 % (ref 19.6–45.3)
MAGNESIUM SERPL-MCNC: 1.5 MG/DL (ref 1.6–2.4)
MCH RBC QN AUTO: 32.3 PG (ref 26.6–33)
MCHC RBC AUTO-ENTMCNC: 32.9 G/DL (ref 31.5–35.7)
MCV RBC AUTO: 98.3 FL (ref 79–97)
MONOCYTES # BLD AUTO: 0.68 10*3/MM3 (ref 0.1–0.9)
MONOCYTES NFR BLD AUTO: 8.7 % (ref 5–12)
NEUTROPHILS NFR BLD AUTO: 5.47 10*3/MM3 (ref 1.7–7)
NEUTROPHILS NFR BLD AUTO: 70 % (ref 42.7–76)
NRBC BLD AUTO-RTO: 0 /100 WBC (ref 0–0.2)
PHOSPHATE SERPL-MCNC: 2.3 MG/DL (ref 2.5–4.5)
PLATELET # BLD AUTO: 212 10*3/MM3 (ref 140–450)
PMV BLD AUTO: 10.6 FL (ref 6–12)
POTASSIUM SERPL-SCNC: 4.2 MMOL/L (ref 3.5–5.2)
RBC # BLD AUTO: 2.97 10*6/MM3 (ref 4.14–5.8)
SODIUM SERPL-SCNC: 139 MMOL/L (ref 136–145)
WBC NRBC COR # BLD AUTO: 7.82 10*3/MM3 (ref 3.4–10.8)

## 2025-08-01 PROCEDURE — 80069 RENAL FUNCTION PANEL: CPT | Performed by: INTERNAL MEDICINE

## 2025-08-01 PROCEDURE — 94664 DEMO&/EVAL PT USE INHALER: CPT

## 2025-08-01 PROCEDURE — 25010000002 MAGNESIUM SULFATE 2 GM/50ML SOLUTION: Performed by: INTERNAL MEDICINE

## 2025-08-01 PROCEDURE — 94799 UNLISTED PULMONARY SVC/PX: CPT

## 2025-08-01 PROCEDURE — 85025 COMPLETE CBC W/AUTO DIFF WBC: CPT

## 2025-08-01 PROCEDURE — 83735 ASSAY OF MAGNESIUM: CPT

## 2025-08-01 RX ORDER — AMOXICILLIN 250 MG
2 CAPSULE ORAL 2 TIMES DAILY PRN
Start: 2025-08-01

## 2025-08-01 RX ORDER — MAGNESIUM SULFATE HEPTAHYDRATE 40 MG/ML
2 INJECTION, SOLUTION INTRAVENOUS ONCE
Status: COMPLETED | OUTPATIENT
Start: 2025-08-01 | End: 2025-08-01

## 2025-08-01 RX ORDER — LANOLIN ALCOHOL/MO/W.PET/CERES
100 CREAM (GRAM) TOPICAL DAILY
Start: 2025-08-01

## 2025-08-01 RX ORDER — ACETAMINOPHEN 325 MG/1
650 TABLET ORAL EVERY 4 HOURS PRN
Start: 2025-08-01

## 2025-08-01 RX ADMIN — MAGNESIUM SULFATE HEPTAHYDRATE 2 G: 40 INJECTION, SOLUTION INTRAVENOUS at 10:44

## 2025-08-01 RX ADMIN — SENNOSIDES AND DOCUSATE SODIUM 2 TABLET: 50; 8.6 TABLET ORAL at 07:52

## 2025-08-01 RX ADMIN — DIBASIC SODIUM PHOSPHATE, MONOBASIC POTASSIUM PHOSPHATE AND MONOBASIC SODIUM PHOSPHATE 2 TABLET: 852; 155; 130 TABLET ORAL at 10:43

## 2025-08-01 RX ADMIN — ARFORMOTEROL TARTRATE 15 MCG: 15 SOLUTION RESPIRATORY (INHALATION) at 09:21

## 2025-08-01 RX ADMIN — Medication 100 MG: at 07:52

## 2025-08-01 RX ADMIN — ASPIRIN 81 MG: 81 TABLET, COATED ORAL at 07:52

## 2025-08-01 RX ADMIN — TAMSULOSIN HYDROCHLORIDE 0.4 MG: 0.4 CAPSULE ORAL at 07:52

## 2025-08-01 NOTE — DISCHARGE SUMMARY
NAME: Devin Scales ADMIT: 2025   : 1940  PCP: Meron Starks DO    MRN: [id number] LOS: 7 days   AGE/SEX: 84 y.o. male  ROOM: [facility]     Date of Admission:  2025  Date of Discharge:  2025    PCP: Meron Starks DO    CHIEF COMPLAINT  Fall (Head lac, L leg swelling and redness /Multiple falls )      DISCHARGE DIAGNOSIS  Active Hospital Problems    Diagnosis  POA    **Subarachnoid hematoma [S06.6XAA]  Yes    Moderate protein-calorie malnutrition [E44.0]  Yes    Cellulitis of left lower extremity [L03.116]  Yes    SAH (subarachnoid hemorrhage) [I60.9]  Yes    Acute kidney injury [N17.9]  Yes    Cigarette smoker [F17.210]  Yes    COPD (chronic obstructive pulmonary disease) [J44.9]  Yes    Peripheral vascular disease [I73.9]  Yes      Resolved Hospital Problems   No resolved problems to display.       SECONDARY DIAGNOSES  Past Medical History:   Diagnosis Date    Benign essential tremor     BPH (benign prostatic hyperplasia)     Chest pain     Dyspnea     Fatigue     Hyperlipidemia        CONSULTS   [facility]  Nephrology    HOSPITAL COURSE  85 yo with a history of HTN, COPD, PAD who presented with a fall and SAH. Seen by [facility] who recommended conservative management. His home asa was eventually restarted.    He had acute renal failure. Improved with IVFs. Also had catheter for urinary retention/BPH. Will plan on leaving catheter at discharge and then he can follow up with [facility] as outpatient     Treated for cellulitis which has resolved. He is improving but still below baseline he will go to rehab to continue to get stronger.         DIAGNOSTICS    Collected Updated Procedure Result Status    2025 0602 2025 0715 Magnesium [[id number]]   (Abnormal)   Blood    Final result Component Value Units   Magnesium 1.5 Low  mg/dL          2025 0602 2025 0654 CBC Auto Differential [[id number]]   (Abnormal)   Blood    Final result Component Value Units   WBC 7.82 10*3/mm3   RBC  "2.97 Low  10*6/mm3   Hemoglobin 9.6 Low  g/dL   Hematocrit 29.2 Low  %   MCV 98.3 High  fL   MCH 32.3 pg   MCHC 32.9 g/dL   RDW 12.1 Low  %   RDW-SD 43.7 fl   MPV 10.6 fL   Platelets 212 10*3/mm3   Neutrophil % 70.0 %   Lymphocyte % 15.7 Low  %   Monocyte % 8.7 %   Eosinophil % 4.5 %   Basophil % 0.6 %   Immature Grans % 0.5 %   Neutrophils, Absolute 5.47 10*3/mm3   Lymphocytes, Absolute 1.23 10*3/mm3   Monocytes, Absolute 0.68 10*3/mm3   Eosinophils, Absolute 0.35 10*3/mm3   Basophils, Absolute 0.05 10*3/mm3   Immature Grans, Absolute 0.04 10*3/mm3   nRBC 0.0 /100 WBC        08/01/2025 0602 08/01/2025 0721 Renal Function Panel [[id number]]    (Abnormal)   Blood    Final result Component Value Units   Glucose 84 mg/dL   BUN 22.0 mg/dL   Creatinine 1.21 mg/dL   Sodium 139 mmol/L   Potassium 4.2  mmol/L   Chloride 106 mmol/L   CO2 24.1 mmol/L   Calcium 7.8 Low  mg/dL   Albumin 2.7 Low  g/dL   Phosphorus 2.3 Low  mg/dL   Anion Gap 8.9 mmol/L   BUN/Creatinine Ratio 18.2    eGFR 59.8 Low  mL/min/1.73        PHYSICAL EXAM  Objective:  Vital signs: (most recent): Blood pressure 151/84, pulse 86, temperature 97.5 °F (36.4 °C), temperature source Oral, resp. rate 16, height 172 cm (67.72\"), weight 59.2 kg (130 lb 8.2 oz), SpO2 100%.                Alert  nad  No resp distress  +partida  Chronically ill  Some poor memory    CONDITION ON DISCHARGE  Stable.      DISCHARGE DISPOSITION   Skilled Nursing Facility (DC - External)      DISCHARGE MEDICATIONS       Your medication list        START taking these medications        Instructions Last Dose Given Next Dose Due   acetaminophen 325 MG tablet  Commonly known as: TYLENOL      Take 2 tablets by mouth Every 4 (Four) Hours As Needed for Fever (fever greater than 100.4 °F or headache).       sennosides-docusate 8.6-50 MG per tablet  Commonly known as: PERICOLACE      Take 2 tablets by mouth 2 (Two) Times a Day As Needed for Constipation.       thiamine 100 MG tablet  Commonly known " as: VITAMIN B1      Take 1 tablet by mouth Daily.              CONTINUE taking these medications        Instructions Last Dose Given Next Dose Due   Aspirin Low Dose 81 MG EC tablet  Generic drug: aspirin      Take 1 tablet by mouth Daily.       atorvastatin 80 MG tablet  Commonly known as: LIPITOR      Take 1 tablet by mouth Daily.       Breztri Aerosphere 160-9-4.8 MCG/ACT aerosol inhaler  Generic drug: Budeson-Glycopyrrol-Formoterol      Inhale 2 puffs 2 (Two) Times a Day.       propranolol XL 80 MG 24 hr capsule  Commonly known as: INNOPRAN XL      1 capsule Daily.       tamsulosin 0.4 MG capsule 24 hr capsule  Commonly known as: FLOMAX      Take 1 capsule by mouth Daily.              STOP taking these medications      lisinopril 10 MG tablet  Commonly known as: PRINIVIL,ZESTRIL        pantoprazole 40 MG EC tablet  Commonly known as: PROTONIX                  Where to Get Your Medications        Information about where to get these medications is not yet available    Ask your nurse or doctor about these medications  acetaminophen 325 MG tablet  sennosides-docusate 8.6-50 MG per tablet  thiamine 100 MG tablet        No future appointments.   Contact information for follow-up providers       Meron Starks DO .    Specialty: Family Medicine  Contact information:  [address] [address]  [phone]                       Contact information for after-discharge care       Destination       [facility] .    Service: Skilled Nursing  Contact information:  [address] [facility] [address] [phone]                                   TEST  RESULTS PENDING AT DISCHARGE         Ga Whitmore MD  [facility]  08/01/25  10:32 EDT      Time: greater than 32 minutes on discharge  It was a pleasure taking care of this patient while in the hospital.

## 2025-08-01 NOTE — PLAN OF CARE
Goal Outcome Evaluation:      Pt A/Ox1, RA, VSS. No acute changes overnight. Pt remains pleasantly confused, redirectable if needed. Lacerations to face and head remain open to air. [place] catheter remains in place- plan to dc pt with catheter. Pt appears to be eating well- asked for and finished multiple snacks overnight. Possible discharge expected today. Bed alarm on. Call light within reach. Plan of care ongoing.

## 2025-08-01 NOTE — NURSING NOTE
[facility] requesting info of when to remove patient's forehead sutures. S/w Dr. Whitmore asked that we reach out to [facility]. Per [facility], remove sutures in 7 days. Note left for [facility].

## 2025-08-01 NOTE — CASE MANAGEMENT/SOCIAL WORK
Continued Stay Note  [facility]     Patient Name: Devin Scales  MRN: [id number]  Today's Date: 8/1/2025    Admit Date: 7/25/2025    Plan: [facility] per [facility] w[facility]c van at 1300   Discharge Plan       Row Name 08/01/25 1045       Plan    Plan [facility] per [facility] w[facility] van at 1300    Patient/Family in Agreement with Plan yes    Plan Comments Spoke with [facility]/ [facility] to confirm bed available. Requested transport after noon; [facility] w[facility] van scheduled for 1300. Packet given to primary RN. Called son to update with discharge and transportation time. Son voiced agreement and understanding.                   Discharge Codes    No documentation.                 Expected Discharge Date and Time       Expected Discharge Date Expected Discharge Time    Aug 1, 2025               Soledad Samson RN

## 2025-08-01 NOTE — CASE MANAGEMENT/SOCIAL WORK
Case Management Discharge Note      Final Note: [facility] per [facility]to[facility] bear.    Provided Post Acute Provider List?: Yes  Post Acute Provider List: Nursing Home  Provided Post Acute Provider Quality & Resource List?: Yes  Post Acute Provider Quality and Resource List: Nursing Home  Delivered To: Support Person  Support Person: Devin Scales  Method of Delivery: Other (comment) (Email- [email or web address])    Selected Continued Care - Discharged on 8/1/2025 Admission date: 7/25/2025 - Discharge disposition: Skilled Nursing Facility (DC - External)      Destination Coordination complete.      Service Provider Services Address Phone Fax Patient Preferred    [facility] [address] [facility] [address] [phone] --              Durable Medical Equipment    No services have been selected for the patient.                Dialysis/Infusion    No services have been selected for the patient.                Home Medical Care    No services have been selected for the patient.                Therapy    No services have been selected for the patient.                Community Resources    No services have been selected for the patient.                Community & DME    No services have been selected for the patient.                    Transportation Services  Transportation: W/C [facility]  W/C Van: [facility]    Final Discharge Disposition Code: 03 - skilled nursing facility (SNF)

## 2025-08-02 ENCOUNTER — HOSPITAL ENCOUNTER (EMERGENCY)
Facility: HOSPITAL | Age: 85
Discharge: HOME | End: 2025-08-02
Attending: EMERGENCY MEDICINE
Payer: MEDICARE

## 2025-08-02 VITALS
OXYGEN SATURATION: 100 % | RESPIRATION RATE: 16 BRPM | SYSTOLIC BLOOD PRESSURE: 135 MMHG | HEART RATE: 83 BPM | TEMPERATURE: 98.2 F | DIASTOLIC BLOOD PRESSURE: 64 MMHG

## 2025-08-02 DIAGNOSIS — Z48.02 VISIT FOR SUTURE REMOVAL: ICD-10-CM

## 2025-08-02 DIAGNOSIS — T83.9XXA PROBLEM WITH FOLEY CATHETER, INITIAL ENCOUNTER: Primary | ICD-10-CM

## 2025-08-02 PROCEDURE — 99283 EMERGENCY DEPT VISIT LOW MDM: CPT

## 2025-08-02 PROCEDURE — 51702 INSERT TEMP BLADDER CATH: CPT

## 2025-08-02 NOTE — CASE MANAGEMENT/SOCIAL WORK
Discharge Planning Assessment  [facility]     Patient Name: Devin Sacles  MRN: [id number]  Today's Date: 8/2/2025    Admit Date: 8/2/2025        Discharge Needs Assessment    No documentation.                  Discharge Plan       Row Name 08/02/25 1110       Plan    Plan Comments Request for transportation back to his SNF from primary RN.  Contacted [facility], arranged wheelchair van to transport this patient to [facility] [address]   [facility] [facility] [address]. [facility] phone number is [phone]. [facility] will provide the wheelchair. Pt is able to stand and pivot per the primary RN. Reservation number is [id number]. Pt has dementia/confusion, is noncombative per the primary RN. ETA is 2 pm today.  Notified the primary RN. BRISEYDA Clements RN ccp.                  Continued Care and Services - Admitted Since 8/2/2025    No active coordination exists.       Selected Continued Care - Prior Encounters Includes continued care and service providers with selected services from prior encounters from 5/4/2025 to 8/2/2025      Discharged on 8/1/2025 Admission date: 7/25/2025 - Discharge disposition: Skilled Nursing Facility (DC - External)      Destination       Service Provider Services Address Phone Fax Patient Preferred    [facility] [address] [facility] [address] [phone] --                             Demographic Summary    No documentation.                  Functional Status    No documentation.                  Psychosocial    No documentation.                  Abuse/Neglect    No documentation.                  Legal    No documentation.                  Substance Abuse    No documentation.                  Patient Forms    No documentation.                     Nicole Clements RN

## 2025-08-02 NOTE — ED PROVIDER NOTES
Pt presents to the ED c/o displacement of urinary catheter, had partially pulled it out at nursing home but nursing of staff was unable to replace it.  Currently catheter is back in place, patient without complaint.     On exam,   General: No acute distress, nontoxic  HEENT: EOMI  Pulm: Symmetric chest rise, nonlabored breathing  CV: Regular rate and rhythm  GI: Nondistended, nontender  : Moctezuma catheter in place draining well  MSK: No deformity  Skin: Warm, dry  Neuro: Awake, alert, moving all extremities, no focal deficits  Psych: Calm, cooperative    Vital signs and nursing notes reviewed.           Plan: Will replace urinary catheter and reevaluate.    ED Course as of 08/02/25 0931   Sat Aug 02, 2025   0745 Patient presents to ER from nursing home after partially removing his Moctezuma catheter.  Belly catheter has been replaced.  Patient does have some moderate hematuria.  Will irrigate bladder and reevaluate.    Patient also has a laceration to his right forehead which the sutures need to be removed. [EE]   0931 Patient's catheter has been irrigated.  Urine appears more strawlike at this point.  We will discharge. [EE]      ED Course User Index  [EE] Brian Self PA       Catheter draining well, replaced, patient comfortable without complaint.  Safe for discharge back to facility.  ED return for worsening symptoms as needed.     MD Attestation Note    SHARED VISIT: This visit was performed by BOTH a physician and an APC. The substantive portion of the medical decision making was performed by this attesting physician who made or approved the management plan and takes responsibility for patient management. All studies in the APC note (if performed) were independently interpreted by me.                   Baldev Machado MD  08/02/25 0931       Baldev Machado MD  08/02/25 0984

## 2025-08-02 NOTE — DISCHARGE INSTRUCTIONS
Continue current medications, outpatient PCP and urology follow-up as needed, ED return for worsening symptoms as needed.

## 2025-08-02 NOTE — ED PROVIDER NOTES
EMERGENCY DEPARTMENT ENCOUNTER    Room Number:  25/25  Date of encounter:  8/2/2025  PCP: Prabhjot Beaver MD  Historian: Patient  Chronic or social conditions impacting care (social determinants of health): Full code from nursing home    HPI:  Chief Complaint: Moctezuma catheter issues  A complete HPI/ROS/PMH/PSH/SH/FH are unobtainable due to: Dementia    Context: Devin Scales is a 84 y.o. male with a history of COPD, hypercholesterolemia, who presents to the ED c/o acute issues with his catheter.  Patient apparently partially pulled out his catheter.  The nursing home staff attempted to replace this however were unable to do so.  He denies any focal complaints.    Patient also has a laceration to his right forehead from a fall on 7/25/2025.  The sutures will need need to be removed today.    Review of prior external notes (non-ED):   Reviewed recent admission from 7/25/2025.  Patient admitted for fall with subarachnoid hemorrhage.    Review of prior external test results outside of this encounter:  I reviewed a renal function panel from yesterday.  Creatinine 1.21, potassium 4.2    PAST MEDICAL HISTORY  Active Ambulatory Problems     Diagnosis Date Noted    Subarachnoid hemorrhage following injury, no loss of consciousness, initial encounter 12/25/2022    COPD (chronic obstructive pulmonary disease) 12/26/2022    Cigarette smoker 12/26/2022    Alcohol dependence 12/26/2022    Peripheral vascular disease 12/26/2022    CSF leak 03/15/2023    Acute kidney injury 05/31/2023    Subarachnoid hematoma 07/25/2025    SAH (subarachnoid hemorrhage) 07/25/2025    Cellulitis of left lower extremity 07/27/2025    Moderate protein-calorie malnutrition 07/30/2025     Resolved Ambulatory Problems     Diagnosis Date Noted    No Resolved Ambulatory Problems     Past Medical History:   Diagnosis Date    Benign essential tremor     BPH (benign prostatic hyperplasia)     Chest pain     Dyspnea     Fatigue     Hyperlipidemia           PAST SURGICAL HISTORY  Past Surgical History:   Procedure Laterality Date    CARDIAC CATHETERIZATION N/A 2/13/2018    Procedure: Left Heart Cath;  Surgeon: Emelia Arellano MD;  Location: [facility] CATH INVASIVE LOCATION;  Service:     CARDIAC CATHETERIZATION N/A 2/13/2018    Procedure: Coronary angiography;  Surgeon: Emelia Arellano MD;  Location:  [facility] CATH INVASIVE LOCATION;  Service:     CARDIAC CATHETERIZATION N/A 2/13/2018    Procedure: Left ventriculography;  Surgeon: Emelia Arellano MD;  Location: [facility] CATH INVASIVE LOCATION;  Service:          FAMILY HISTORY  No family history on file.      SOCIAL HISTORY  Social History     Socioeconomic History    Marital status: Single   Tobacco Use    Smoking status: Every Day     Current packs/day: 0.25     Average packs/day: 0.3 packs/day for 30.0 years (7.5 ttl pk-yrs)     Types: Cigarettes    Smokeless tobacco: Never   Vaping Use    Vaping status: Never Used   Substance and Sexual Activity    Alcohol use: Yes     Comment: SOCIALLY    Drug use: No    Sexual activity: Defer         ALLERGIES  Patient has no known allergies.        REVIEW OF SYSTEMS  All systems reviewed and negative except for those discussed in HPI.       PHYSICAL EXAM    I have reviewed the triage vital signs and nursing notes.    ED Triage Vitals [08/02/25 0618]   Temp Heart Rate Resp BP SpO2   98.2 °F (36.8 °C) 76 16 136/78 98 %      Temp src Heart Rate Source Patient Position BP Location FiO2 (%)   Oral Monitor -- -- --       Physical Exam  GENERAL: Alert, oriented to self, elderly, not distressed  HENT: head atraumatic, no nuchal rigidity  EYES: no scleral icterus, EOMI  CV: regular rhythm, regular rate, no murmur  RESPIRATORY: normal effort, CTA  ABDOMEN: soft, nontender  : No Moctezuma is in good position.  MUSCULOSKELETAL: no deformity, FROM, no calf swelling or tenderness  NEURO: alert, moves all extremities, follows commands  SKIN: warm, dry      ADDITIONAL ORDERS CONSIDERED BUT NOT  ORDERED:  Admission was considered but after careful review of the patient's presentation, physical examination, diagnostic results, and response to treatment the patient may be safely discharged with outpatient follow-up.     Procedure: Sutures removed from the right forehead.  Patient tolerated well.    PROGRESS, DATA ANALYSIS, CONSULTS, AND MEDICAL DECISION MAKING    All labs have been independently interpreted by myself.  All radiology studies have been independently interpreted by myself and discussed with radiologist dictating the report.   EKGs independently interpreted by myself.  Discussion below represents my analysis of pertinent findings related to patient's condition, differential diagnosis, treatment plan and final disposition.    I have discussed case with Dr. Machado, emergency room physician.  He has performed his own bedside examination and agrees with treatment plan.    ED Course as of 08/02/25 1517   Sat Aug 02, 2025   0745 Patient presents to ER from nursing home after partially removing his Moctezuma catheter.  Belly catheter has been replaced.  Patient does have some moderate hematuria.  Will irrigate bladder and reevaluate.    Patient also has a laceration to his right forehead which the sutures need to be removed. [EE]   0931 Patient's catheter has been irrigated.  Urine appears more strawlike at this point.  We will discharge. [EE]      ED Course User Index  [EE] Brian Self PA       AS OF 15:17 EDT VITALS:    BP - 135/64  HR - 83  TEMP - 98.2 °F (36.8 °C) (Oral)  O2 SATS - 100%        DIAGNOSIS  Final diagnoses:   Problem with Moctezuma catheter, initial encounter         DISPOSITION  Discharged    Admission was considered but after careful review of the patient's presentation, physical examination, diagnostic results, and response to treatment the patient may be safely discharged with outpatient follow-up.         Dictated utilizing Dragon dictation     Brian Self PA  08/02/25 1517

## 2025-08-15 PROBLEM — U07.1 COVID-19 VIRUS INFECTION: Status: ACTIVE | Noted: 2025-01-01

## 2025-08-15 PROBLEM — F03.90 DEMENTIA: Status: ACTIVE | Noted: 2025-01-01

## 2025-08-15 PROBLEM — N39.0 ACUTE UTI (URINARY TRACT INFECTION): Status: ACTIVE | Noted: 2025-01-01

## 2025-08-20 PROBLEM — E43 SEVERE PROTEIN-CALORIE MALNUTRITION: Status: ACTIVE | Noted: 2025-01-01

## 2025-08-23 PROBLEM — G31.1 SENILE DEGENERATION OF BRAIN: Status: ACTIVE | Noted: 2025-01-01

## (undated) DEVICE — CATH DIAG IMPULSE FL3.5 5F 100CM

## (undated) DEVICE — CATH VENT MIV RADL PIG ST TIP 5F 110CM

## (undated) DEVICE — CATH DIAG IMPULSE FR4 5F 100CM

## (undated) DEVICE — PK CATH CARD 40

## (undated) DEVICE — GW EMR FIX EXCHG J STD .035 3MM 260CM

## (undated) DEVICE — GLIDESHEATH BASIC HYDROPHILIC COATED INTRODUCER SHEATH: Brand: GLIDESHEATH

## (undated) DEVICE — KT MANIFLD CARDIAC